# Patient Record
Sex: FEMALE | Race: WHITE | Employment: OTHER | URBAN - METROPOLITAN AREA
[De-identification: names, ages, dates, MRNs, and addresses within clinical notes are randomized per-mention and may not be internally consistent; named-entity substitution may affect disease eponyms.]

---

## 2017-07-21 ENCOUNTER — OFFICE VISIT (OUTPATIENT)
Dept: CARDIOLOGY CLINIC | Age: 82
End: 2017-07-21

## 2017-07-21 VITALS
DIASTOLIC BLOOD PRESSURE: 84 MMHG | HEIGHT: 65 IN | HEART RATE: 84 BPM | SYSTOLIC BLOOD PRESSURE: 122 MMHG | WEIGHT: 137 LBS | OXYGEN SATURATION: 95 % | BODY MASS INDEX: 22.82 KG/M2

## 2017-07-21 DIAGNOSIS — I10 ESSENTIAL HYPERTENSION: ICD-10-CM

## 2017-07-21 DIAGNOSIS — I48.19 PERSISTENT ATRIAL FIBRILLATION (HCC): Primary | ICD-10-CM

## 2017-07-21 DIAGNOSIS — E78.5 HYPERLIPIDEMIA, UNSPECIFIED HYPERLIPIDEMIA TYPE: ICD-10-CM

## 2017-07-21 PROCEDURE — 99214 OFFICE O/P EST MOD 30 MIN: CPT | Performed by: INTERNAL MEDICINE

## 2017-07-21 PROCEDURE — 4040F PNEUMOC VAC/ADMIN/RCVD: CPT | Performed by: INTERNAL MEDICINE

## 2017-07-21 PROCEDURE — G8420 CALC BMI NORM PARAMETERS: HCPCS | Performed by: INTERNAL MEDICINE

## 2017-07-21 PROCEDURE — 1123F ACP DISCUSS/DSCN MKR DOCD: CPT | Performed by: INTERNAL MEDICINE

## 2017-07-21 PROCEDURE — G8427 DOCREV CUR MEDS BY ELIG CLIN: HCPCS | Performed by: INTERNAL MEDICINE

## 2017-07-21 PROCEDURE — 93000 ELECTROCARDIOGRAM COMPLETE: CPT | Performed by: INTERNAL MEDICINE

## 2017-07-21 PROCEDURE — 1036F TOBACCO NON-USER: CPT | Performed by: INTERNAL MEDICINE

## 2017-07-21 PROCEDURE — 1090F PRES/ABSN URINE INCON ASSESS: CPT | Performed by: INTERNAL MEDICINE

## 2017-07-21 RX ORDER — FUROSEMIDE 40 MG/1
40 TABLET ORAL PRN
COMMUNITY
End: 2020-12-17 | Stop reason: SDUPTHER

## 2017-07-21 RX ORDER — POLYETHYLENE GLYCOL 3350 17 G/17G
17 POWDER, FOR SOLUTION ORAL DAILY
COMMUNITY
End: 2020-09-15 | Stop reason: ALTCHOICE

## 2018-08-02 ENCOUNTER — HOSPITAL ENCOUNTER (OUTPATIENT)
Age: 83
Discharge: HOME | End: 2018-08-02
Payer: MEDICARE

## 2018-08-02 ENCOUNTER — HOSPITAL ENCOUNTER (OUTPATIENT)
Dept: GENERAL RADIOLOGY | Age: 83
Discharge: HOME | End: 2018-08-02
Payer: MEDICARE

## 2018-08-02 DIAGNOSIS — M54.50 ACUTE LEFT-SIDED LOW BACK PAIN WITHOUT SCIATICA: ICD-10-CM

## 2018-08-02 PROCEDURE — 72110 X-RAY EXAM L-2 SPINE 4/>VWS: CPT

## 2019-06-28 ENCOUNTER — OFFICE VISIT (OUTPATIENT)
Dept: ENT CLINIC | Age: 84
End: 2019-06-28
Payer: MEDICARE

## 2019-06-28 VITALS — HEART RATE: 85 BPM | SYSTOLIC BLOOD PRESSURE: 124 MMHG | DIASTOLIC BLOOD PRESSURE: 70 MMHG | OXYGEN SATURATION: 96 %

## 2019-06-28 DIAGNOSIS — R04.0 EPISTAXIS, RECURRENT: Primary | ICD-10-CM

## 2019-06-28 PROCEDURE — 99202 OFFICE O/P NEW SF 15 MIN: CPT | Performed by: OTOLARYNGOLOGY

## 2019-06-28 PROCEDURE — 1090F PRES/ABSN URINE INCON ASSESS: CPT | Performed by: OTOLARYNGOLOGY

## 2019-06-28 PROCEDURE — G8421 BMI NOT CALCULATED: HCPCS | Performed by: OTOLARYNGOLOGY

## 2019-06-28 PROCEDURE — 1123F ACP DISCUSS/DSCN MKR DOCD: CPT | Performed by: OTOLARYNGOLOGY

## 2019-06-28 PROCEDURE — G8427 DOCREV CUR MEDS BY ELIG CLIN: HCPCS | Performed by: OTOLARYNGOLOGY

## 2019-06-28 PROCEDURE — 4040F PNEUMOC VAC/ADMIN/RCVD: CPT | Performed by: OTOLARYNGOLOGY

## 2019-06-28 PROCEDURE — 1036F TOBACCO NON-USER: CPT | Performed by: OTOLARYNGOLOGY

## 2019-06-28 PROCEDURE — 30901 CONTROL OF NOSEBLEED: CPT | Performed by: OTOLARYNGOLOGY

## 2019-06-28 RX ORDER — SIMVASTATIN 40 MG
40 TABLET ORAL NIGHTLY
COMMUNITY
End: 2020-06-15 | Stop reason: ALTCHOICE

## 2019-06-29 ASSESSMENT — ENCOUNTER SYMPTOMS
SINUS PRESSURE: 0
FACIAL SWELLING: 0
SINUS PAIN: 0
EYES NEGATIVE: 1
TROUBLE SWALLOWING: 0
RHINORRHEA: 0
ALLERGIC/IMMUNOLOGIC NEGATIVE: 1
SORE THROAT: 0
RESPIRATORY NEGATIVE: 1
VOICE CHANGE: 0

## 2019-06-29 NOTE — PROGRESS NOTES
SUBJECTIVE:    Chief Complaint   Patient presents with    Epistaxis     Nosebleeds 1 heavy a day, mainly the left side, End of April is when it all started. Charmayne Guppy is a 80 y.o. female    Patient has had recurrent left epistaxis for few weeks but has not needed packing. No anticoagulants used and no prior history or other bleeding. No family history. Does nort smoke. No trauma. Last episode this AM.    Past Medical History:   Diagnosis Date    Cancer ([facility] [address]     Hypertension       No past surgical history on file. Family History   Problem Relation Age of Onset    Heart Disease Neg Hx       Social History     Tobacco Use    Smoking status: Never Smoker    Smokeless tobacco: Never Used   Substance Use Topics    Alcohol use: No        Review of Systems:  Review of Systems   Constitutional: Negative. Negative for fever. HENT: Positive for nosebleeds. Negative for congestion, dental problem, drooling, ear discharge, ear pain, facial swelling, hearing loss, mouth sores, postnasal drip, rhinorrhea, sinus pressure, sinus pain, sneezing, sore throat, tinnitus, trouble swallowing and voice change. Eyes: Negative. Respiratory: Negative. Cardiovascular: Negative. Endocrine: Negative. Skin: Negative. Allergic/Immunologic: Negative. Neurological: Negative. Negative for dizziness. Hematological: Negative. Psychiatric/Behavioral: Negative. OBJECTIVE:  /70   Pulse 85   SpO2 96%   Physical Exam   Constitutional: She is oriented to person, place, and time. She appears well-developed and well-nourished. HENT:   Head: Normocephalic and atraumatic. Right Ear: External ear normal.   Left Ear: External ear normal.   Mouth/Throat: Oropharynx is clear and moist. No oropharyngeal exudate. Indirect laryngoscopy is negative. Nasal passages treated with cotton impregnated with Afrin and Lidocaine for 5 minutes. Bleeding site appears to be anterior septum on the left. Treated with silver nitrate cautery. Eyes: Pupils are equal, round, and reactive to light. Conjunctivae and EOM are normal.   Neck: Normal range of motion. Neck supple. No tracheal deviation present. No thyromegaly present. Cardiovascular: Normal rate and regular rhythm. Pulmonary/Chest: Effort normal.   Lymphadenopathy:     She has no cervical adenopathy. Neurological: She is alert and oriented to person, place, and time. Skin: Skin is warm and dry. Psychiatric: She has a normal mood and affect. Her behavior is normal. Judgment and thought content normal.        ASSESSMENT:    Recurrent left epistaxis anterior    PLAN:     Will use saline gel TID and Bactroban BID for 2 weeks. Instructed to not lift or bend for 24 hours. Will return ir recurs.     Ela Arias MD

## 2019-10-22 ENCOUNTER — HOSPITAL ENCOUNTER (OUTPATIENT)
Age: 84
Discharge: HOME | End: 2019-10-22
Payer: MEDICARE

## 2019-10-22 ENCOUNTER — HOSPITAL ENCOUNTER (OUTPATIENT)
Dept: GENERAL RADIOLOGY | Age: 84
Discharge: HOME | End: 2019-10-22
Payer: MEDICARE

## 2019-10-22 DIAGNOSIS — G89.29 UPPER BACK PAIN, CHRONIC: ICD-10-CM

## 2019-10-22 DIAGNOSIS — R10.9 FLANK PAIN: ICD-10-CM

## 2019-10-22 DIAGNOSIS — M54.9 UPPER BACK PAIN, CHRONIC: ICD-10-CM

## 2019-10-22 PROCEDURE — 72070 X-RAY EXAM THORAC SPINE 2VWS: CPT

## 2019-10-22 PROCEDURE — 72100 X-RAY EXAM L-S SPINE 2/3 VWS: CPT

## 2020-04-02 ENCOUNTER — APPOINTMENT (OUTPATIENT)
Dept: CT IMAGING | Age: 85
End: 2020-04-02
Payer: MEDICARE

## 2020-04-02 ENCOUNTER — HOSPITAL ENCOUNTER (EMERGENCY)
Age: 85
Discharge: HOME | End: 2020-04-02
Payer: MEDICARE

## 2020-04-02 ENCOUNTER — APPOINTMENT (OUTPATIENT)
Dept: GENERAL RADIOLOGY | Age: 85
End: 2020-04-02
Payer: MEDICARE

## 2020-04-02 VITALS
SYSTOLIC BLOOD PRESSURE: 122 MMHG | WEIGHT: 135 LBS | BODY MASS INDEX: 25.49 KG/M2 | HEART RATE: 76 BPM | DIASTOLIC BLOOD PRESSURE: 56 MMHG | TEMPERATURE: 98.2 F | RESPIRATION RATE: 16 BRPM | OXYGEN SATURATION: 95 % | HEIGHT: 61 IN

## 2020-04-02 PROCEDURE — 71101 X-RAY EXAM UNILAT RIBS/CHEST: CPT

## 2020-04-02 PROCEDURE — 72125 CT NECK SPINE W/O DYE: CPT

## 2020-04-02 PROCEDURE — 6370000000 HC RX 637 (ALT 250 FOR IP): Performed by: NURSE PRACTITIONER

## 2020-04-02 PROCEDURE — 70450 CT HEAD/BRAIN W/O DYE: CPT

## 2020-04-02 PROCEDURE — 99284 EMERGENCY DEPT VISIT MOD MDM: CPT

## 2020-04-02 RX ORDER — LIDOCAINE 4 G/G
1 PATCH TOPICAL ONCE
Status: DISCONTINUED | OUTPATIENT
Start: 2020-04-02 | End: 2020-04-02 | Stop reason: HOSPADM

## 2020-04-02 ASSESSMENT — ENCOUNTER SYMPTOMS
RHINORRHEA: 0
NAUSEA: 0
SHORTNESS OF BREATH: 0
CONSTIPATION: 0
VOMITING: 0
BLOOD IN STOOL: 0
DIARRHEA: 0
SORE THROAT: 0
ABDOMINAL PAIN: 0

## 2020-04-02 ASSESSMENT — PAIN SCALES - GENERAL: PAINLEVEL_OUTOF10: 7

## 2020-04-02 ASSESSMENT — PAIN DESCRIPTION - LOCATION: LOCATION: RIB CAGE

## 2020-04-02 ASSESSMENT — PAIN DESCRIPTION - ORIENTATION: ORIENTATION: LEFT

## 2020-04-02 NOTE — ED PROVIDER NOTES
[facility]        Pt Name: Juana Hong  MRN: [id number]  [place] 4/10/1928  Date of evaluation: 4/2/2020  Provider: OZZIE Roberts CNP  PCP: Todd Lagunas MD    This patient was not seen and evaluated by the attending physician No att. providers found. CHIEF COMPLAINT       Chief Complaint   Patient presents with    Fall     pt brought in by family after falling in the tub yesterday. pt not sure what happened, but states she was getting out of the tub and the next thing she knew she was falling backward into the tub. pt was unable to get self out of the tub and had to wait for family. HISTORY OF PRESENT ILLNESS   (Location/Symptom, Timing/Onset,Context/Setting, Quality, Duration, Modifying Factors, Severity)  Note limiting factors. Juana Hong is a 80 y.o. female who presents emergency department with concern for evaluation after a fall. This woman reports that yesterday she fell over the bathtub and landed into the bathtub. No head injury or LOC. She is having pain to her left ribs. She is anticoagulated on Xarelto. She denies fever, rash, headaches, dizziness, chest pain, shortness of breath, cough, congestion, abdominal pain, nausea, vomiting, diarrhea, constipation, blood in the stool, or painful urination. No family at bedside. Nursing Notes triage note reviewed and agreed with or any disagreements were addressed  in the HPI. REVIEW OF SYSTEMS    (2-9 systems for level 4, 10 or more for level 5)     Review of Systems   Constitutional: Negative for chills and fever. HENT: Negative for postnasal drip, rhinorrhea and sore throat. Eyes: Negative for visual disturbance. Respiratory: Negative for shortness of breath. Cardiovascular: Negative for chest pain. Gastrointestinal: Negative for abdominal pain, blood in stool, constipation, diarrhea, nausea and vomiting.    Genitourinary: Normal breath sounds. No stridor. No wheezing or rales. Chest:      Chest wall: Tenderness present. Abdominal:      General: Bowel sounds are normal. There is no distension. Palpations: Abdomen is soft. There is no mass. Tenderness: There is no abdominal tenderness. There is no guarding or rebound. Musculoskeletal: Normal range of motion. General: No tenderness. Skin:     General: Skin is warm and dry. Coloration: Skin is not pale. Comments: Erythema noted to the back concerning for sunburn. Neurological:      Mental Status: She is alert and oriented to person, place, and time. Coordination: Coordination normal.   Psychiatric:         Behavior: Behavior normal.         DIAGNOSTIC RESULTS   LABS:    Labs Reviewed - No data to display    All other labs werewithin normal range or not returned as of this dictation. EKG: All EKG's are interpreted by the Emergency Department Physician who either signs or Co-signs this chart in the absence of acardiologist.  Please see their note for interpretation of EKG. RADIOLOGY:   Interpretation per the Radiologist below, if available at the time of this note:    CT Cervical Spine WO Contrast   Final Result   No acute abnormality of the cervical spine. Minimal loss of height, T3 and T4 more likely old as above. CT Head WO Contrast   Preliminary Result   1. No acute intracranial abnormality. 2. Stable mild age-appropriate atrophy and chronic small vessel ischemic   white matter disease. 3. Mild right mastoid disease. XR RIBS LEFT INCLUDE CHEST (MIN 3 VIEWS)   Final Result   1. No acute abnormality seen in the chest or left ribs   2. Hiatal hernia           No results found.       PROCEDURES   Unless otherwise noted below, none     Procedures    CRITICAL CARE TIME     n/a    CONSULTS:  None      EMERGENCY DEPARTMENT COURSE and DIFFERENTIAL DIAGNOSIS/MDM:   Vitals:    Vitals:    04/02/20 1100 04/02/20 1130 04/02/20 1200 04/02/20 1308   BP: (!) 140/72 137/66 138/62 130/76   Pulse:    76   Resp:    16   Temp:       TempSrc:       SpO2: 96% 98% 95% 98%   Weight:       Height:           Dimitri Brunner was given the following medications:  Medications   lidocaine 4 % external patch 1 patch (1 patch Transdermal Patch Applied 4/2/20 1205)       Dimitri Brunner was evaluated in the emergency department with concern for evaluation after fall. Complaining of left rib pain. She is anticoagulant on Xarelto, therefore CT imaging was performed. CT imaging of head and neck are negative for acute abnormality. X-ray imaging of the chest is also negative and shows no evidence of rib fractures. Treated with Lidoderm pain patch in the ER. I was able to ambulate the patient without any assistance safely prior to discharge. My suspicion is low for serious injury including fracture, dislocation, compartment syndrome, or intracranial hemorrhage. The injury sounds consistent with mechanical fall. My suspicion is low for syncopy, arrhythmia, TIA, stroke, seizure, abuse, or PE. Dimitri Brunner is stable in the ER and safe to follow as an outpatient. The patient is discharged on the following medications. They were counseled on how to take the newly prescribed medications:  New Prescriptions    LIDOCAINE-MENTHOL 4-1 % PTCH    Apply 1 patch topically every 24 hours For 12 hrs on and 12 hrs off    . Instructed to follow-up with:  Gilson Flowers MD  [id number]    Schedule an appointment as soon as possible for a visit in 3 days  For a recheck    Return to the ER for new or worsening symptoms. This plan was discussed with the patient and all family available in the room at discharge who are all in agreement with the plan. I evaluated the patient. The physician was available for consultation as needed.   The patient and / or the family were informed of the results of any tests, a time was given to answer questions, a plan was proposed and they agreed with plan. FINAL IMPRESSION      1. Fall, initial encounter    2. Anticoagulated    3.  Rib pain on left side          DISPOSITION/PLAN   DISPOSITION Decision To Discharge 04/02/2020 02:07:43 PM        DISCONTINUED MEDICATIONS:  Discontinued Medications    No medications on file                (Please note that portions of this note were completed with a voice recognition program.  Efforts were made to edit the dictations but occasionally words are mis-transcribed.)    OZZIE Crisostomo CNP (electronically signed)        OZZIE Crisostomo CNP  04/02/20 6952

## 2020-04-02 NOTE — ED NOTES
Patient discharged at this time in no acute distress after verbalizing understanding of discharge instructions and need for follow up with PCP .   Pt left ambulatory to discharge area after reviewing and receiving copy of ov AVS.   Patient education  Learner- Patient and family  Motivation and readiness to learn- Medium to High  Barriers to learning- None  Learning preference/provided instructions- Both written and verbal discharge instructions     Leda Thomas RN  04/02/20 0179

## 2020-04-02 NOTE — ED NOTES
Assume patient care at this time. Agree with previous assessment. Pt alert, STYLES, NAD, resps easy and even. Current Plan of Care reviewed with patient. Pt denies any needs or questions at this time. Bed locked and in low position, with side rails up x 2.       Caterina Durán RN  04/02/20 4218

## 2020-06-02 ENCOUNTER — TELEPHONE (OUTPATIENT)
Dept: CARDIOLOGY CLINIC | Age: 85
End: 2020-06-02

## 2020-06-02 ENCOUNTER — TELEPHONE (OUTPATIENT)
Dept: FAMILY MEDICINE CLINIC | Age: 85
End: 2020-06-02

## 2020-06-02 NOTE — TELEPHONE ENCOUNTER
Pts granddaughter calling, pt is trying to find a new PCP and pt is out of Rx rivaroxaban (XARELTO) 15 MG TABS tablet 1 tab daily and old PCP won't fill it. Pt wants to know if we can fill it for 1 mo until pt finds new PCP? Pharmacy info :  [facility]/pharmacy #[id number]- [facility], [address] [place] [address].  Pls call to advise Thank you

## 2020-06-15 ENCOUNTER — VIRTUAL VISIT (OUTPATIENT)
Dept: FAMILY MEDICINE CLINIC | Age: 85
End: 2020-06-15
Payer: MEDICARE
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
## 2020-06-15 PROBLEM — M54.9 CHRONIC BACK PAIN: Status: ACTIVE | Noted: 2020-06-15

## 2020-06-15 PROBLEM — M25.473 ANKLE SWELLING: Status: ACTIVE | Noted: 2020-06-15

## 2020-06-15 PROBLEM — G89.29 CHRONIC BACK PAIN: Status: ACTIVE | Noted: 2020-06-15

## 2020-06-15 PROCEDURE — 1090F PRES/ABSN URINE INCON ASSESS: CPT | Performed by: FAMILY MEDICINE

## 2020-06-15 PROCEDURE — G8427 DOCREV CUR MEDS BY ELIG CLIN: HCPCS | Performed by: FAMILY MEDICINE

## 2020-06-15 PROCEDURE — 1036F TOBACCO NON-USER: CPT | Performed by: FAMILY MEDICINE

## 2020-06-15 PROCEDURE — G8417 CALC BMI ABV UP PARAM F/U: HCPCS | Performed by: FAMILY MEDICINE

## 2020-06-15 PROCEDURE — 1123F ACP DISCUSS/DSCN MKR DOCD: CPT | Performed by: FAMILY MEDICINE

## 2020-06-15 PROCEDURE — 99204 OFFICE O/P NEW MOD 45 MIN: CPT | Performed by: FAMILY MEDICINE

## 2020-06-15 PROCEDURE — 4040F PNEUMOC VAC/ADMIN/RCVD: CPT | Performed by: FAMILY MEDICINE

## 2020-06-15 ASSESSMENT — PATIENT HEALTH QUESTIONNAIRE - PHQ9
SUM OF ALL RESPONSES TO PHQ QUESTIONS 1-9: 0
SUM OF ALL RESPONSES TO PHQ QUESTIONS 1-9: 0

## 2020-06-15 ASSESSMENT — LIFESTYLE VARIABLES: HOW OFTEN DO YOU HAVE A DRINK CONTAINING ALCOHOL: 0

## 2020-06-15 NOTE — PROGRESS NOTES
of epistasix: hx of seeing ENT in 2019. Given bactroban and saline gel to use. Hx of ARF 2/2 sepsis- . Hx of skin cancer: ? type    Hx of cataract surgery     SH:  6/15/20: she lives with her granddaughter who is age 32 and has lived with her since birth. Daughter used to live with them too but she  10 years ago. No other children. Social History     Socioeconomic History    Marital status:      Spouse name: Not on file    Number of children: Not on file    Years of education: Not on file    Highest education level: Not on file   Occupational History    Not on file   Social Needs    Financial resource strain: Not on file    Food insecurity     Worry: Not on file     Inability: Not on file    Transportation needs     Medical: Not on file     Non-medical: Not on file   Tobacco Use    Smoking status: Never Smoker    Smokeless tobacco: Never Used   Substance and Sexual Activity    Alcohol use: No    Drug use: Never    Sexual activity: Not on file   Lifestyle    Physical activity     Days per week: Not on file     Minutes per session: Not on file    Stress: Not on file   Relationships    Social connections     Talks on phone: Not on file     Gets together: Not on file     Attends Mandaen service: Not on file     Active member of club or organization: Not on file     Attends meetings of clubs or organizations: Not on file     Relationship status: Not on file    Intimate partner violence     Fear of current or ex partner: Not on file     Emotionally abused: Not on file     Physically abused: Not on file     Forced sexual activity: Not on file   Other Topics Concern    Not on file   Social History Narrative    Not on file         Review of Systems  As documented in the HPI. Currently no complaints of CP or CIERA. Vital Signs: (As obtained by patient/caregiver or practitioner observation)    There were no vitals taken for this visit.   Patient-Reported Vitals

## 2020-06-15 NOTE — PATIENT INSTRUCTIONS
Personalized Preventive Plan for Lien [facility] - 6/15/2020  Medicare offers a range of preventive health benefits. Some of the tests and screenings are paid in full while other may be subject to a deductible, co-insurance, and/or copay. Some of these benefits include a comprehensive review of your medical history including lifestyle, illnesses that may run in your family, and various assessments and screenings as appropriate. After reviewing your medical record and screening and assessments performed today your provider may have ordered immunizations, labs, imaging, and/or referrals for you. A list of these orders (if applicable) as well as your Preventive Care list are included within your After Visit Summary for your review. Other Preventive Recommendations:    · A preventive eye exam performed by an eye specialist is recommended every 1-2 years to screen for glaucoma; cataracts, macular degeneration, and other eye disorders. · A preventive dental visit is recommended every 6 months. · Try to get at least 150 minutes of exercise per week or 10,000 steps per day on a pedometer . · Order or download the FREE \"Exercise & Physical Activity: Your Everyday Guide\" from [employer]. Call 5-136.709.2894 or search [employer] online. · You need 6343-6572 mg of calcium and 7477-7857 IU of vitamin D per day. It is possible to meet your calcium requirement with diet alone, but a vitamin D supplement is usually necessary to meet this goal.  · When exposed to the sun, use a sunscreen that protects against both UVA and UVB radiation with an SPF of 30 or greater. Reapply every 2 to 3 hours or after sweating, drying off with a towel, or swimming. · Always wear a seat belt when traveling in a car. Always wear a helmet when riding a bicycle or motorcycle.
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
## 2020-08-06 ENCOUNTER — TELEPHONE (OUTPATIENT)
Dept: FAMILY MEDICINE CLINIC | Age: 85
End: 2020-08-06

## 2020-08-06 NOTE — TELEPHONE ENCOUNTER
Pt granddaughter Rissa Ojeda called bc pt handicap card is getting ready to exp. Pt needs letter for new handicap card. [phone] [address] for letter? Please advise.  Thanks

## 2020-08-06 NOTE — LETTER
[facility] [address]  Phone: [phone]  Fax: [phone]    Fidel Mi MD        August 6, 2020     Patient: Natalee Pulido   YOB: 1928   Date of Visit: 8/6/2020       To Whom It May Concern: It is my medical opinion that Ely Martínez requires a disability parking placard for the following reasons:  She cannot walk 200 feet without stopping to rest.  Duration of need: 5 years    If you have any questions or concerns, please don't hesitate to call.     Sincerely,          Fidel Mi MD

## 2020-09-15 ENCOUNTER — VIRTUAL VISIT (OUTPATIENT)
Dept: FAMILY MEDICINE CLINIC | Age: 85
End: 2020-09-15
Payer: MEDICARE

## 2020-09-15 VITALS — HEIGHT: 61 IN | BODY MASS INDEX: 24.35 KG/M2 | WEIGHT: 129 LBS

## 2020-09-15 PROCEDURE — 4040F PNEUMOC VAC/ADMIN/RCVD: CPT | Performed by: FAMILY MEDICINE

## 2020-09-15 PROCEDURE — 1123F ACP DISCUSS/DSCN MKR DOCD: CPT | Performed by: FAMILY MEDICINE

## 2020-09-15 PROCEDURE — G0438 PPPS, INITIAL VISIT: HCPCS | Performed by: FAMILY MEDICINE

## 2020-09-15 RX ORDER — BLOOD PRESSURE TEST KIT
1 KIT MISCELLANEOUS WEEKLY
Qty: 1 KIT | Refills: 0 | Status: SHIPPED | OUTPATIENT
Start: 2020-09-15

## 2020-09-15 ASSESSMENT — PATIENT HEALTH QUESTIONNAIRE - PHQ9
SUM OF ALL RESPONSES TO PHQ9 QUESTIONS 1 & 2: 0
SUM OF ALL RESPONSES TO PHQ QUESTIONS 1-9: 0
2. FEELING DOWN, DEPRESSED OR HOPELESS: 0
SUM OF ALL RESPONSES TO PHQ QUESTIONS 1-9: 0
1. LITTLE INTEREST OR PLEASURE IN DOING THINGS: 0

## 2020-09-15 ASSESSMENT — LIFESTYLE VARIABLES: HOW OFTEN DO YOU HAVE A DRINK CONTAINING ALCOHOL: 0

## 2020-09-15 NOTE — PROGRESS NOTES
Medicare Annual Wellness Visit  Name: Romero Pond Date: 9/15/2020   MRN: [id number] Sex: Female   Age: 80 y.o. Ethnicity: Non-/Non    : 4/10/1928 Race: [place] Matt is here for CHAINels and Other (concerned w/ posture after her fall 2 yrs ago)    Screenings for behavioral, psychosocial and functional/safety risks, and cognitive dysfunction are all negative except as indicated below. These results, as well as other patient data from the [address] form, are documented in Flowsheets linked to this Encounter. No Known Allergies    Prior to Visit Medications    Medication Sig Taking? Authorizing Provider   rivaroxaban (XARELTO) 15 MG TABS tablet Take 1 tablet by mouth daily Yes Claudia Longoria MD   furosemide (LASIX) 40 MG tablet Take 40 mg by mouth as needed Yes Historical Provider, MD   diltiazem (CARDIZEM CD) 240 MG ER capsule Take 1 capsule by mouth daily Yes Marietta Hernandez MD   famotidine (PEPCID) 20 MG tablet Take 1 tablet by mouth 2 times daily Yes Marietta Hernandez MD   lovastatin (MEVACOR) 40 MG tablet Take 40 mg by mouth nightly. Yes Historical Provider, MD       Past Medical History:   Diagnosis Date    Atrial fibrillation ([address]     History of skin cancer     Hyperlipidemia     Hypertension        No past surgical history on file. Family History   Problem Relation Age of Onset    Heart Disease Neg Hx        CareTeam (Including outside providers/suppliers regularly involved in providing care):   Patient Care Team:  Claudia Longoria MD as PCP - General (Family Medicine)  Claudia Longoria MD as PCP - [facility] Empaneled Provider    Wt Readings from Last 3 Encounters:   09/15/20 129 lb (58.5 kg)   20 135 lb (61.2 kg)   17 137 lb (62.1 kg)     Patient-Reported Vitals 6/15/2020   Patient-Reported Weight 135 lb   Patient-Reported Height 5 1      Body mass index is 24.37 kg/m².     Based upon direct observation of the patient, evaluation of cognition reveals recent and remote memory intact. Physical Exam  Constitutional:       General: She is not in acute distress. Appearance: Normal appearance. She is not ill-appearing. HENT:      Head: Normocephalic and atraumatic. Nose: Nose normal.   Pulmonary:      Effort: Pulmonary effort is normal. No respiratory distress. Neurological:      General: No focal deficit present. Mental Status: She is alert. Psychiatric:         Mood and Affect: Mood normal.         Behavior: Behavior normal.           Patient's complete Health Risk Assessment and screening values have been reviewed and are found in Flowsheets. The following problems were reviewed today and where indicated follow up appointments were made and/or referrals ordered. Positive Risk Factor Screenings with Interventions:     Fall Risk:  2 or more falls in past year?: no  Fall with injury in past year?: (!) yes(fell backwards in the backtub, went to the ER)  Fall Risk Interventions:    · Home safety tips provided  · Patient declines any further evaluation/treatment for this issue    General Health:  General  In general, how would you say your health is?: Very Good  In the past 7 days, have you experienced any of the following?  New or Increased Pain, New or Increased Fatigue, Loneliness, Social Isolation, Stress or Anger?: (!) New or Increased Pain, Loneliness(still aches and pains from fall; stopped driving)  Do you get the social and emotional support that you need?: Yes  Do you have a Living Will?: Yes  General Health Risk Interventions:  · Pain issues: patient declines any further evaluation/treatment for this issue    Health Habits/Nutrition:  Health Habits/Nutrition  Do you exercise for at least 20 minutes 2-3 times per week?: (!) No  Have you lost any weight without trying in the past 3 months?: No  Do you eat fewer than 2 meals per day?: No  Have you seen a dentist within the past year?: (!) No  Body mass index is 24.37 kg/m². Health Habits/Nutrition Interventions:  · Dental exam overdue:  patient encouraged to make appointment with his/her dentist    Hearing/Vision:  No exam data present  Hearing/Vision  Do you or your family notice any trouble with your hearing?: No  Do you have difficulty driving, watching TV, or doing any of your daily activities because of your eyesight?: No  Have you had an eye exam within the past year?: (!) No  Hearing/Vision Interventions:  · Vision concerns:  patient encouraged to make appointment with his/her eye specialist    ADL:  ADLs  In the past 7 days, did you need help from others to perform any of the following everyday activities? Eating, dressing, grooming, bathing, toileting, or walking/balance?: None  In the past 7 days, did you need help from others to take care of any of the following? Laundry, housekeeping, banking/finances, shopping, telephone use, food preparation, transportation, or taking medications?: [employer], Housekeeping, Shopping, Food Preparation(granddaughter lives with her to assist)  ADL Interventions:  · Patient declines any further evaluation/treatment for this issue    Personalized Preventive Plan   Current Health Maintenance Status  Immunization History   Administered Date(s) Administered    Tdap (Boostrix, Adacel) 05/07/2014        Health Maintenance   Topic Date Due    Lipid screen  04/10/1938    Shingles Vaccine (1 of 2) 04/10/1978    Potassium monitoring  04/17/2017    Creatinine monitoring  04/17/2017    Annual Wellness Visit (AWV)  06/23/2019    Flu vaccine (1) 09/01/2020    DTaP/Tdap/Td vaccine (2 - Td) 05/07/2024    Pneumococcal 65+ years Vaccine  Completed    Hepatitis A vaccine  Aged Out    Hepatitis B vaccine  Aged Out    Hib vaccine  Aged Out    Meningococcal (ACWY) vaccine  Aged Out     Recommendations for Stemgent Due: see orders and patient instructions/AVS.  .   Recommended screening schedule for the next 5-10 years is provided to the patient in written form: see Patient Romina Patel was seen today for medicare awv and other. Diagnoses and all orders for this visit:    Routine general medical examination at a health care facility          Atrial Fibrillation: The patient is on xarelto for anticoagulation - reports she has always been on 15 mg and Cardizem for rate control. Hx of seeing Dr. Milton Roberson last in 2017. She has been stable on this regimen a long time.     HTN: The patient is on Diltiazem. Will do BMP.      HLD: She is on Mevacor. Will check lipids.      CKD stage 3: Will do BMP     Ankle edema: the patient takes lasix as needed. Hasn't needed it in months.      GERD:  Takes pepcid BID. Controls her symptoms well.      Hx of fall 3/20:  Slipped falling out of the shower. Her back still bothers her from the fall - improving. She declined additional intervention. Granddaughter has purchase a bathtub seat now and has made arrangements so it is safer.      Hx of epistasix: hx of seeing ENT in . Given bactroban and saline gel to use.      Hx of ARF 2/2 sepsis- .       Hx of skin cancer: ? type     Hx of cataract surgery      SH:  6/15/20: she lives with her granddaughter Jesse Mcneill who is age 31(works for [employer]?)  and has lived with her since birth. Patient's daughter used to live with them too but she  10 years ago. No other children.         Leroy Savage is a 80 y.o. female being evaluated by a Virtual Visit (video and audio) encounter to address concerns as mentioned above. A caregiver was present when appropriate. Due to this being a TeleHealth encounter (During DDWWO-93 public health emergency), evaluation of the following organ systems was limited: Vitals/Constitutional/EENT/Resp/CV/GI//MS/Neuro/Skin/Heme-Lymph-Imm.   Pursuant to the emergency declaration under the [address] and Response Supplemental Appropriations Act, this Virtual Visit was conducted with patient's (and/or legal guardian's) consent, to reduce the patient's risk of exposure to COVID-19 and provide necessary medical care. The patient (and/or legal guardian) has also been advised to contact this office for worsening conditions or problems, and seek emergency medical treatment and/or call 911 if deemed necessary. Patient identification was verified at the start of the visit: Yes    Services were provided through a video synchronous discussion virtually to substitute for in-person clinic visit. Patient and provider were located at their individual homes. --Joe Flores MD on 9/15/2020 at 8:20 AM    An electronic signature was used to authenticate this note.

## 2020-09-15 NOTE — PATIENT INSTRUCTIONS
Personalized Preventive Plan for Natalee Pulido - 9/15/2020  Medicare offers a range of preventive health benefits. Some of the tests and screenings are paid in full while other may be subject to a deductible, co-insurance, and/or copay. Some of these benefits include a comprehensive review of your medical history including lifestyle, illnesses that may run in your family, and various assessments and screenings as appropriate. After reviewing your medical record and screening and assessments performed today your provider may have ordered immunizations, labs, imaging, and/or referrals for you. A list of these orders (if applicable) as well as your Preventive Care list are included within your After Visit Summary for your review. Other Preventive Recommendations:    · A preventive eye exam performed by an eye specialist is recommended every 1-2 years to screen for glaucoma; cataracts, macular degeneration, and other eye disorders. · A preventive dental visit is recommended every 6 months. · Try to get at least 150 minutes of exercise per week or 10,000 steps per day on a pedometer . · Order or download the FREE \"Exercise & Physical Activity: Your Everyday Guide\" from [employer]. Call 0-599.456.8704 or search [employer] online. · You need 5390-0860 mg of calcium and 4368-4380 IU of vitamin D per day. It is possible to meet your calcium requirement with diet alone, but a vitamin D supplement is usually necessary to meet this goal.  · When exposed to the sun, use a sunscreen that protects against both UVA and UVB radiation with an SPF of 30 or greater. Reapply every 2 to 3 hours or after sweating, drying off with a towel, or swimming. · Always wear a seat belt when traveling in a car. Always wear a helmet when riding a bicycle or motorcycle.

## 2020-09-19 ENCOUNTER — HOSPITAL ENCOUNTER (OUTPATIENT)
Age: 85
Discharge: HOME | End: 2020-09-19
Payer: MEDICARE

## 2020-09-19 LAB
ANION GAP SERPL CALCULATED.3IONS-SCNC: 13 MMOL/L (ref 3–16)
BUN BLDV-MCNC: 20 MG/DL (ref 7–20)
CALCIUM SERPL-MCNC: 9.9 MG/DL (ref 8.3–10.6)
CHLORIDE BLD-SCNC: 104 MMOL/L (ref 99–110)
CHOLESTEROL, TOTAL: 148 MG/DL (ref 0–199)
CO2: 23 MMOL/L (ref 21–32)
CREAT SERPL-MCNC: 1.1 MG/DL (ref 0.6–1.2)
GFR AFRICAN AMERICAN: 56
GFR NON-AFRICAN AMERICAN: 46
GLUCOSE BLD-MCNC: 89 MG/DL (ref 70–99)
HDLC SERPL-MCNC: 61 MG/DL (ref 40–60)
LDL CHOLESTEROL CALCULATED: 68 MG/DL
POTASSIUM SERPL-SCNC: 4.1 MMOL/L (ref 3.5–5.1)
SODIUM BLD-SCNC: 140 MMOL/L (ref 136–145)
TRIGL SERPL-MCNC: 93 MG/DL (ref 0–150)
VLDLC SERPL CALC-MCNC: 19 MG/DL

## 2020-09-19 PROCEDURE — 80048 BASIC METABOLIC PNL TOTAL CA: CPT

## 2020-09-19 PROCEDURE — 80061 LIPID PANEL: CPT

## 2020-09-19 PROCEDURE — 36415 COLL VENOUS BLD VENIPUNCTURE: CPT

## 2020-09-24 ENCOUNTER — TELEPHONE (OUTPATIENT)
Dept: ADMINISTRATIVE | Age: 85
End: 2020-09-24

## 2020-09-24 NOTE — TELEPHONE ENCOUNTER
Den Gutierrez is calling to inform Dr. Gui Bennett that when the patient has a bowel movement, there is blood on the tissue as well. Den Gutierrez is wanting to ask if there is a test that should be ran to verify why that is happening. Patient was not present.

## 2020-09-25 ENCOUNTER — OFFICE VISIT (OUTPATIENT)
Dept: FAMILY MEDICINE CLINIC | Age: 85
End: 2020-09-25
Payer: MEDICARE

## 2020-09-25 ENCOUNTER — HOSPITAL ENCOUNTER (OUTPATIENT)
Age: 85
Discharge: HOME | DRG: 378 | End: 2020-09-25
Payer: MEDICARE

## 2020-09-25 ENCOUNTER — HOSPITAL ENCOUNTER (INPATIENT)
Age: 85
LOS: 6 days | Discharge: HOME | DRG: 378 | End: 2020-10-02
Attending: EMERGENCY MEDICINE | Admitting: HOSPITALIST
Payer: MEDICARE

## 2020-09-25 LAB
BASOPHILS ABSOLUTE: 0.1 K/UL (ref 0–0.2)
BASOPHILS ABSOLUTE: 0.1 K/UL (ref 0–0.2)
BASOPHILS RELATIVE PERCENT: 0.6 %
BASOPHILS RELATIVE PERCENT: 0.8 %
EOSINOPHILS ABSOLUTE: 0.1 K/UL (ref 0–0.6)
EOSINOPHILS ABSOLUTE: 0.1 K/UL (ref 0–0.6)
EOSINOPHILS RELATIVE PERCENT: 0.8 %
EOSINOPHILS RELATIVE PERCENT: 0.9 %
HCT VFR BLD CALC: 36.7 % (ref 36–48)
HCT VFR BLD CALC: 36.7 % (ref 36–48)
HEMOGLOBIN: 12.1 G/DL (ref 12–16)
HEMOGLOBIN: 12.2 G/DL (ref 12–16)
LYMPHOCYTES ABSOLUTE: 1.7 K/UL (ref 1–5.1)
LYMPHOCYTES ABSOLUTE: 1.9 K/UL (ref 1–5.1)
LYMPHOCYTES RELATIVE PERCENT: 18.8 %
LYMPHOCYTES RELATIVE PERCENT: 20.2 %
MCH RBC QN AUTO: 32.4 PG (ref 26–34)
MCH RBC QN AUTO: 32.5 PG (ref 26–34)
MCHC RBC AUTO-ENTMCNC: 33 G/DL (ref 31–36)
MCHC RBC AUTO-ENTMCNC: 33.2 G/DL (ref 31–36)
MCV RBC AUTO: 97.8 FL (ref 80–100)
MCV RBC AUTO: 98.3 FL (ref 80–100)
MONOCYTES ABSOLUTE: 0.8 K/UL (ref 0–1.3)
MONOCYTES ABSOLUTE: 1.2 K/UL (ref 0–1.3)
MONOCYTES RELATIVE PERCENT: 11.6 %
MONOCYTES RELATIVE PERCENT: 9.8 %
NEUTROPHILS ABSOLUTE: 5.7 K/UL (ref 1.7–7.7)
NEUTROPHILS ABSOLUTE: 6.8 K/UL (ref 1.7–7.7)
NEUTROPHILS RELATIVE PERCENT: 68.2 %
NEUTROPHILS RELATIVE PERCENT: 68.3 %
PDW BLD-RTO: 14.1 % (ref 12.4–15.4)
PDW BLD-RTO: 14.2 % (ref 12.4–15.4)
PLATELET # BLD: 201 K/UL (ref 135–450)
PLATELET # BLD: 208 K/UL (ref 135–450)
PMV BLD AUTO: 8.8 FL (ref 5–10.5)
PMV BLD AUTO: 9.4 FL (ref 5–10.5)
RBC # BLD: 3.74 M/UL (ref 4–5.2)
RBC # BLD: 3.75 M/UL (ref 4–5.2)
WBC # BLD: 10 K/UL (ref 4–11)
WBC # BLD: 8.3 K/UL (ref 4–11)

## 2020-09-25 PROCEDURE — G8427 DOCREV CUR MEDS BY ELIG CLIN: HCPCS | Performed by: FAMILY MEDICINE

## 2020-09-25 PROCEDURE — 86901 BLOOD TYPING SEROLOGIC RH(D): CPT

## 2020-09-25 PROCEDURE — 1123F ACP DISCUSS/DSCN MKR DOCD: CPT | Performed by: FAMILY MEDICINE

## 2020-09-25 PROCEDURE — 85025 COMPLETE CBC W/AUTO DIFF WBC: CPT

## 2020-09-25 PROCEDURE — 99284 EMERGENCY DEPT VISIT MOD MDM: CPT

## 2020-09-25 PROCEDURE — 80053 COMPREHEN METABOLIC PANEL: CPT

## 2020-09-25 PROCEDURE — 99213 OFFICE O/P EST LOW 20 MIN: CPT | Performed by: FAMILY MEDICINE

## 2020-09-25 PROCEDURE — 85730 THROMBOPLASTIN TIME PARTIAL: CPT

## 2020-09-25 PROCEDURE — 1090F PRES/ABSN URINE INCON ASSESS: CPT | Performed by: FAMILY MEDICINE

## 2020-09-25 PROCEDURE — 86900 BLOOD TYPING SEROLOGIC ABO: CPT

## 2020-09-25 PROCEDURE — 4040F PNEUMOC VAC/ADMIN/RCVD: CPT | Performed by: FAMILY MEDICINE

## 2020-09-25 PROCEDURE — 86850 RBC ANTIBODY SCREEN: CPT

## 2020-09-25 PROCEDURE — 1036F TOBACCO NON-USER: CPT | Performed by: FAMILY MEDICINE

## 2020-09-25 PROCEDURE — 85610 PROTHROMBIN TIME: CPT

## 2020-09-25 PROCEDURE — G8420 CALC BMI NORM PARAMETERS: HCPCS | Performed by: FAMILY MEDICINE

## 2020-09-25 PROCEDURE — 36415 COLL VENOUS BLD VENIPUNCTURE: CPT

## 2020-09-25 ASSESSMENT — ENCOUNTER SYMPTOMS
CONSTIPATION: 0
BACK PAIN: 0
BLOOD IN STOOL: 1
NAUSEA: 0
ABDOMINAL DISTENTION: 0
WHEEZING: 0
COLOR CHANGE: 0
ABDOMINAL PAIN: 0
COUGH: 0
STRIDOR: 0
SHORTNESS OF BREATH: 0
RECTAL PAIN: 0
VOMITING: 0
DIARRHEA: 0

## 2020-09-25 NOTE — TELEPHONE ENCOUNTER
Please try calling again to get more information and see how she is doing. Can do FIT cards. Please let me know how she is doing. If still not able to call her back please leave it in the inbox until resolved. Thanks.

## 2020-09-25 NOTE — PROGRESS NOTES
TELEHEALTH EVALUATION -- Audio/Visual (During [facility]- public health emergency)    Katherine Purchase   YOB: 1928    Date of Visit:  2020    No Known Allergies  Current Outpatient Medications on File Prior to Visit   Medication Sig Dispense Refill    Blood Pressure KIT 1 each by Does not apply route once a week 1 kit 0    rivaroxaban (XARELTO) 15 MG TABS tablet Take 1 tablet by mouth daily 90 tablet 1    furosemide (LASIX) 40 MG tablet Take 40 mg by mouth as needed      diltiazem (CARDIZEM CD) 240 MG ER capsule Take 1 capsule by mouth daily 30 capsule 11    famotidine (PEPCID) 20 MG tablet Take 1 tablet by mouth 2 times daily 60 tablet 11    lovastatin (MEVACOR) 40 MG tablet Take 40 mg by mouth nightly. No current facility-administered medications on file prior to visit. Wt Readings from Last 3 Encounters:   09/15/20 129 lb (58.5 kg)   20 135 lb (61.2 kg)   17 137 lb (62.1 kg)     BP Readings from Last 3 Encounters:   20 (!) 122/56   19 124/70   17 122/84          Smita Worrell (:  4/10/1928) has requested to and consented to an audio/video evaluation for the concerns or medical issues discussed below. Chief Complaint   Patient presents with    Rectal Bleeding     started last weekend, now has blood w/ each bowel movement        HPI    Rectal bleeding:  Symptoms present about 1 week. She has noticed bright red blood in the stool. It only occurs with a BM. Has a BM about once a day. No blood noticed on her pads. She is certain it is in the bowels and not vaginal or other source. The blood is always from the back. No systemic symptoms. Symptoms have been stable over the course of a week. No rectal pain. No stomach pain. Feeling good. No hx of colonoscopy. No other bleeding. Atrial Fibrillation: The patient is on xarelto for anticoagulation - reports she has always been on 15 mg and Cardizem for rate control.  Hx of seeing  Henthron last in 2017.  She has been stable on this regimen a long time.     HTN: The patient is on Diltiazem. Will do BMP.      HLD: She is on Mevacor. Will check lipids.      CKD stage 3: Will do BMP     Ankle edema: the patient takes lasix as needed. Hasn't needed it in months.      GERD:  Takes pepcid BID. Controls her symptoms well.      Hx of fall 3/20:  Slipped falling out of the shower.  Her back still bothers her from the fall - improving. She declined additional intervention.  Granddaughter has purchase a bathtub seat now and has made arrangements so it is safer.      Hx of epistasix: hx of seeing ENT in 2019. Given bactroban and saline gel to use.      Hx of ARF 2/2 sepsis- .       Hx of skin cancer: ? type     Hx of cataract surgery      SH:  6/15/20: she lives with her granddaughter Rajiv Villalobos who is age 31(works for [employer] for [employer]?)  and has lived with her since birth.  Patient's daughter used to live with them too but she  10 years ago. No other children.      Social History     Socioeconomic History    Marital status:      Spouse name: Not on file    Number of children: Not on file    Years of education: Not on file    Highest education level: Not on file   Occupational History    Not on file   Social Needs    Financial resource strain: Not on file    Food insecurity     Worry: Not on file     Inability: Not on file    Transportation needs     Medical: Not on file     Non-medical: Not on file   Tobacco Use    Smoking status: Never Smoker    Smokeless tobacco: Never Used   Substance and Sexual Activity    Alcohol use: No    Drug use: Never    Sexual activity: Not on file   Lifestyle    Physical activity     Days per week: Not on file     Minutes per session: Not on file    Stress: Not on file   Relationships    Social connections     Talks on phone: Not on file     Gets together: Not on file     Attends Rastafari service: Not on file     Active member of club or organization: Not on file     Attends meetings of clubs or organizations: Not on file     Relationship status: Not on file    Intimate partner violence     Fear of current or ex partner: Not on file     Emotionally abused: Not on file     Physically abused: Not on file     Forced sexual activity: Not on file   Other Topics Concern    Not on file   Social History Narrative    Not on file         Review of Systems  As documented in the HPI. Currently no complaints of CP or CIERA. Vital Signs: (As obtained by patient/caregiver or practitioner observation)    There were no vitals taken for this visit. Patient-Reported Vitals 6/15/2020   Patient-Reported Weight 135 lb   Patient-Reported Height 5 1        Physical Exam  Constitutional:       General: She is not in acute distress. Appearance: Normal appearance. She is not ill-appearing. HENT:      Head: Normocephalic and atraumatic. Nose: Nose normal.   Pulmonary:      Effort: Pulmonary effort is normal. No respiratory distress. Neurological:      General: No focal deficit present. Mental Status: She is alert. Psychiatric:         Mood and Affect: Mood normal.         Behavior: Behavior normal.           Assessment/Plan     1. Hematochezia  CBC, colonoscopy and GI consult. Patient advised to do all of this as soon as she can. If she develops any symptoms such as lightheadedness, etc she is to go to the ER. Return precautions reviewed. - CBC Auto Differential; Future  - [facility] - Chip Mclean MD, Gastroenterology, [facility]      Discussed medications with patient, who voiced understanding of their use and indications. All questions answered. Return for CBC, colonosocpy, GI consult . Alexis Arroyo is being evaluated by a Virtual Visit (video visit) encounter to address concerns as mentioned above. A caregiver was present when appropriate.  Due to this being a TeleHealth encounter (During [facility] public

## 2020-09-26 ENCOUNTER — APPOINTMENT (OUTPATIENT)
Dept: CT IMAGING | Age: 85
DRG: 378 | End: 2020-09-26
Payer: MEDICARE

## 2020-09-26 PROBLEM — D62 ACUTE BLOOD LOSS ANEMIA: Status: ACTIVE | Noted: 2020-09-26

## 2020-09-26 PROBLEM — N17.9 AKI (ACUTE KIDNEY INJURY) (HCC): Status: ACTIVE | Noted: 2020-09-26

## 2020-09-26 PROBLEM — K62.5 RECTAL BLEEDING: Status: ACTIVE | Noted: 2020-09-26

## 2020-09-26 LAB
A/G RATIO: 1.4 (ref 1.1–2.2)
ABO/RH: NORMAL
ALBUMIN SERPL-MCNC: 4 G/DL (ref 3.4–5)
ALP BLD-CCNC: 86 U/L (ref 40–129)
ALT SERPL-CCNC: 16 U/L (ref 10–40)
ANION GAP SERPL CALCULATED.3IONS-SCNC: 11 MMOL/L (ref 3–16)
ANTIBODY SCREEN: NORMAL
APTT: 38.8 SEC (ref 24.2–36.2)
AST SERPL-CCNC: 27 U/L (ref 15–37)
BILIRUB SERPL-MCNC: <0.2 MG/DL (ref 0–1)
BUN BLDV-MCNC: 27 MG/DL (ref 7–20)
CALCIUM SERPL-MCNC: 9.3 MG/DL (ref 8.3–10.6)
CHLORIDE BLD-SCNC: 103 MMOL/L (ref 99–110)
CO2: 23 MMOL/L (ref 21–32)
CREAT SERPL-MCNC: 1.6 MG/DL (ref 0.6–1.2)
GFR AFRICAN AMERICAN: 36
GFR NON-AFRICAN AMERICAN: 30
GLOBULIN: 2.8 G/DL
GLUCOSE BLD-MCNC: 113 MG/DL (ref 70–99)
HCT VFR BLD CALC: 30.1 % (ref 36–48)
HCT VFR BLD CALC: 30.4 % (ref 36–48)
HCT VFR BLD CALC: 31.7 % (ref 36–48)
HEMOGLOBIN: 10.1 G/DL (ref 12–16)
HEMOGLOBIN: 10.3 G/DL (ref 12–16)
HEMOGLOBIN: 10.6 G/DL (ref 12–16)
INR BLD: 2.24 (ref 0.86–1.14)
POTASSIUM SERPL-SCNC: 4.1 MMOL/L (ref 3.5–5.1)
PROTHROMBIN TIME: 26.2 SEC (ref 10–13.2)
SODIUM BLD-SCNC: 137 MMOL/L (ref 136–145)
TOTAL PROTEIN: 6.8 G/DL (ref 6.4–8.2)

## 2020-09-26 PROCEDURE — 6370000000 HC RX 637 (ALT 250 FOR IP): Performed by: HOSPITALIST

## 2020-09-26 PROCEDURE — 2060000000 HC ICU INTERMEDIATE R&B

## 2020-09-26 PROCEDURE — 36415 COLL VENOUS BLD VENIPUNCTURE: CPT

## 2020-09-26 PROCEDURE — 93971 EXTREMITY STUDY: CPT

## 2020-09-26 PROCEDURE — 74174 CTA ABD&PLVS W/CONTRAST: CPT

## 2020-09-26 PROCEDURE — C9113 INJ PANTOPRAZOLE SODIUM, VIA: HCPCS | Performed by: HOSPITALIST

## 2020-09-26 PROCEDURE — 2580000003 HC RX 258: Performed by: HOSPITALIST

## 2020-09-26 PROCEDURE — 85018 HEMOGLOBIN: CPT

## 2020-09-26 PROCEDURE — 85014 HEMATOCRIT: CPT

## 2020-09-26 PROCEDURE — 6360000004 HC RX CONTRAST MEDICATION: Performed by: PHYSICIAN ASSISTANT

## 2020-09-26 PROCEDURE — 6360000002 HC RX W HCPCS: Performed by: HOSPITALIST

## 2020-09-26 PROCEDURE — 2580000003 HC RX 258: Performed by: PHYSICIAN ASSISTANT

## 2020-09-26 RX ORDER — DILTIAZEM HYDROCHLORIDE 240 MG/1
240 CAPSULE, COATED, EXTENDED RELEASE ORAL DAILY
Status: DISCONTINUED | OUTPATIENT
Start: 2020-09-26 | End: 2020-10-02 | Stop reason: HOSPADM

## 2020-09-26 RX ORDER — PANTOPRAZOLE SODIUM 40 MG/10ML
40 INJECTION, POWDER, LYOPHILIZED, FOR SOLUTION INTRAVENOUS EVERY 12 HOURS
Status: DISCONTINUED | OUTPATIENT
Start: 2020-09-26 | End: 2020-10-02 | Stop reason: HOSPADM

## 2020-09-26 RX ORDER — ONDANSETRON 2 MG/ML
4 INJECTION INTRAMUSCULAR; INTRAVENOUS EVERY 6 HOURS PRN
Status: DISCONTINUED | OUTPATIENT
Start: 2020-09-26 | End: 2020-10-02 | Stop reason: HOSPADM

## 2020-09-26 RX ORDER — SODIUM CHLORIDE 9 MG/ML
10 INJECTION INTRAVENOUS EVERY 12 HOURS
Status: DISCONTINUED | OUTPATIENT
Start: 2020-09-26 | End: 2020-10-02 | Stop reason: HOSPADM

## 2020-09-26 RX ORDER — SODIUM CHLORIDE 0.9 % (FLUSH) 0.9 %
10 SYRINGE (ML) INJECTION PRN
Status: DISCONTINUED | OUTPATIENT
Start: 2020-09-26 | End: 2020-10-02 | Stop reason: HOSPADM

## 2020-09-26 RX ORDER — ATORVASTATIN CALCIUM 10 MG/1
10 TABLET, FILM COATED ORAL DAILY
Status: DISCONTINUED | OUTPATIENT
Start: 2020-09-26 | End: 2020-09-28 | Stop reason: ALTCHOICE

## 2020-09-26 RX ORDER — 0.9 % SODIUM CHLORIDE 0.9 %
1000 INTRAVENOUS SOLUTION INTRAVENOUS ONCE
Status: COMPLETED | OUTPATIENT
Start: 2020-09-26 | End: 2020-09-26

## 2020-09-26 RX ORDER — ACETAMINOPHEN 325 MG/1
650 TABLET ORAL EVERY 6 HOURS PRN
Status: DISCONTINUED | OUTPATIENT
Start: 2020-09-26 | End: 2020-10-02 | Stop reason: HOSPADM

## 2020-09-26 RX ORDER — SODIUM CHLORIDE 0.9 % (FLUSH) 0.9 %
10 SYRINGE (ML) INJECTION EVERY 12 HOURS SCHEDULED
Status: DISCONTINUED | OUTPATIENT
Start: 2020-09-26 | End: 2020-10-02 | Stop reason: HOSPADM

## 2020-09-26 RX ORDER — POLYETHYLENE GLYCOL 3350 17 G/17G
17 POWDER, FOR SOLUTION ORAL DAILY PRN
COMMUNITY

## 2020-09-26 RX ORDER — LOVASTATIN 40 MG/1
40 TABLET ORAL NIGHTLY
Status: DISCONTINUED | OUTPATIENT
Start: 2020-09-26 | End: 2020-09-26 | Stop reason: CLARIF

## 2020-09-26 RX ORDER — ACETAMINOPHEN 650 MG/1
650 SUPPOSITORY RECTAL EVERY 6 HOURS PRN
Status: DISCONTINUED | OUTPATIENT
Start: 2020-09-26 | End: 2020-10-02 | Stop reason: HOSPADM

## 2020-09-26 RX ORDER — PROMETHAZINE HYDROCHLORIDE 25 MG/1
12.5 TABLET ORAL EVERY 6 HOURS PRN
Status: DISCONTINUED | OUTPATIENT
Start: 2020-09-26 | End: 2020-10-02 | Stop reason: HOSPADM

## 2020-09-26 RX ORDER — SODIUM CHLORIDE 9 MG/ML
INJECTION, SOLUTION INTRAVENOUS CONTINUOUS
Status: DISCONTINUED | OUTPATIENT
Start: 2020-09-26 | End: 2020-09-29

## 2020-09-26 RX ADMIN — Medication 10 ML: at 21:31

## 2020-09-26 RX ADMIN — PANTOPRAZOLE SODIUM 40 MG: 40 INJECTION, POWDER, FOR SOLUTION INTRAVENOUS at 05:09

## 2020-09-26 RX ADMIN — SODIUM CHLORIDE: 9 INJECTION, SOLUTION INTRAVENOUS at 04:17

## 2020-09-26 RX ADMIN — PANTOPRAZOLE SODIUM 40 MG: 40 INJECTION, POWDER, FOR SOLUTION INTRAVENOUS at 15:56

## 2020-09-26 RX ADMIN — SODIUM CHLORIDE 1000 ML: 9 INJECTION, SOLUTION INTRAVENOUS at 02:00

## 2020-09-26 RX ADMIN — IOPAMIDOL 75 ML: 755 INJECTION, SOLUTION INTRAVENOUS at 00:12

## 2020-09-26 RX ADMIN — Medication 10 ML: at 05:09

## 2020-09-26 RX ADMIN — Medication 10 ML: at 09:42

## 2020-09-26 RX ADMIN — DILTIAZEM HYDROCHLORIDE 240 MG: 240 CAPSULE, EXTENDED RELEASE ORAL at 09:42

## 2020-09-26 ASSESSMENT — PAIN SCALES - GENERAL: PAINLEVEL_OUTOF10: 0

## 2020-09-26 NOTE — ACP (ADVANCE CARE PLANNING)
Advanced Care Planning Note. Purpose of Encounter: Advanced care planning in light of [facility]  Parties In Attendance: Patient, granddaughter  Decisional Capacity: Yes  Subjective: Patient with rectal bleeding  Objective: Cr 1.6  Goals of Care Determination: Patient wants full support (CPR, vent, surgery, HD, no trach, no PEG)  Plan:  IVF, GI consult, colonoscopy  Code Status: Full code   Time spent on Advanced care Plannin minutes  Advanced Care Planning Documents: Completed advanced directives on chart, granddaughter is the POA.     Adán Roberts MD  2020 2:54 PM

## 2020-09-26 NOTE — ED PROVIDER NOTES
[facility]        Pt Name: Zac Ramirez  MRN: [id number]  [place] 4/10/1928  Date of evaluation: 9/25/2020  Provider: Landon Oneil PA-C  PCP: Abdirizak Fermin MD     I have seen and evaluated this patient with my supervising physician Dr Charmaine Murphy   Patient presents with    Rectal Bleeding     Has noticed some bleeding from her rectum for the last 10 days, today became much worse. Bright red blood       HISTORY OF PRESENT ILLNESS   (Location, Timing/Onset, Context/Setting, Quality, Duration, Modifying Factors, Severity, Associated Signs and Symptoms)  Note limiting factors. Zac Ramirez is a 80 y.o. female who is anticoagulated on Xarelto for atrial fibrillation history who presents to the emergency department with complaints of intermittent rectal bleeding for about 10 days. It was only with bowel movements but states that today now she has having rectal bleeding without any bowel movements. She does not report any abdominal or rectal pain with this. She does have history of hemorrhoids but has never had bleeding hemorrhoids in the past.  She denies dizziness or lightheadedness. She has not had a colonoscopy for years. She does have an appointment with GI specialist on Thursday with Dr. Betsey Awan. She has no history of GI bleed. Nursing Notes were all reviewed and agreed with or any disagreements were addressed in the HPI. REVIEW OF SYSTEMS    (2-9 systems for level 4, 10 or more for level 5)     Review of Systems   Constitutional: Negative for chills and fever. Eyes: Negative for visual disturbance. Respiratory: Negative for cough, shortness of breath, wheezing and stridor. Cardiovascular: Negative for chest pain, palpitations and leg swelling. Gastrointestinal: Positive for blood in stool.  Negative for abdominal distention, abdominal pain, constipation, diarrhea, nausea, rectal pain and vomiting. Endocrine: Negative. Genitourinary: Negative. Musculoskeletal: Negative for back pain, neck pain and neck stiffness. Skin: Negative for color change, pallor, rash and wound. Neurological: Negative for dizziness, tremors, seizures, syncope, facial asymmetry, speech difficulty, weakness, light-headedness, numbness and headaches. Psychiatric/Behavioral: Negative for confusion. All other systems reviewed and are negative. Positives and Pertinent negatives as per HPI. Except as noted above in the ROS, all other systems were reviewed and negative. PAST MEDICAL HISTORY     Past Medical History:   Diagnosis Date    Atrial fibrillation ([facility] [address]     History of skin cancer     Hyperlipidemia     Hypertension          SURGICAL HISTORY   History reviewed. No pertinent surgical history. CURRENTMEDICATIONS       Previous Medications    BLOOD PRESSURE KIT    1 each by Does not apply route once a week    DILTIAZEM (CARDIZEM CD) 240 MG ER CAPSULE    Take 1 capsule by mouth daily    FAMOTIDINE (PEPCID) 20 MG TABLET    Take 1 tablet by mouth 2 times daily    FUROSEMIDE (LASIX) 40 MG TABLET    Take 40 mg by mouth as needed    LOVASTATIN (MEVACOR) 40 MG TABLET    Take 40 mg by mouth nightly. RIVAROXABAN (XARELTO) 15 MG TABS TABLET    Take 1 tablet by mouth daily         ALLERGIES     Patient has no known allergies.     FAMILYHISTORY       Family History   Problem Relation Age of Onset    Heart Disease Neg Hx           SOCIAL HISTORY       Social History     Tobacco Use    Smoking status: Never Smoker    Smokeless tobacco: Never Used   Substance Use Topics    Alcohol use: No    Drug use: Never       SCREENINGS             PHYSICAL EXAM    (up to 7 for level 4, 8 or more for level 5)     ED Triage Vitals [09/25/20 2117]   BP Temp Temp Source Pulse Resp SpO2 Height Weight   138/70 97.9 °F (36.6 °C) Oral 83 16 98 % -- 129 lb (58.5 kg)       Physical Exam  [facility]  [address]  [place], [place]   Phone [phone]   COMPREHENSIVE METABOLIC PANEL - Abnormal; Notable for the following components:    Glucose 113 (*)     BUN 27 (*)     CREATININE 1.6 (*)     GFR Non- 30 (*)     GFR  36 (*)     All other components within normal limits    Narrative:     Performed at:  [facility]  [address]  [place], [place]   Phone [phone]   PROTIME-INR - Abnormal; Notable for the following components:    Protime 26.2 (*)     INR 2.24 (*)     All other components within normal limits    Narrative:     Performed at:  [facility]  [address]  [place], [place]   Phone [phone]   APTT - Abnormal; Notable for the following components:    aPTT 38.8 (*)     All other components within normal limits    Narrative:     Performed at:  [facility]  [address]  [place], [place]   Phone [phone]   Hospitals in [place]       All other labs were within normal range or not returned as of this dictation. EKG: All EKG's are interpreted by the Emergency Department Physician in the absence of a cardiologist.  Please see their note for interpretation of EKG. RADIOLOGY:   Non-plain film images such as CT, Ultrasound and MRI are read by the radiologist. Plain radiographic images are visualized and preliminarily interpreted by the ED Provider with the below findings:        Interpretation per the Radiologist below, if available at the time of this note:     [facility]    (Results Pending)          [facility] (Final result)   Result time 09/26/20 00:56:32   Final result by Niharika Gunter MD (09/26/20 00:56:32)                 Impression:     A definite focus of active extravasation is not identified.      There is a questionable small focus of active extravasation seen very (Please note that portions of this note were completed with a voice recognition program.  Efforts were made to edit the dictations but occasionally words are mis-transcribed.)    Deborah Aldana PA-C (electronically signed)    '      Deborah Aldana PA-C  09/26/20 1500

## 2020-09-26 NOTE — H&P
_    [facility]    9/26/2020 4:38 AM    Patient Information:  Suma Reed is a 80 y.o. female [id number]    PCP:  Zoey Montana MD (Tel: [phone] )    Date of Service:   Pt seen/examined on 9/26/2020   Admitted to Inpatient with expected LOS greater than two midnights due to medical therapy. Chief complaint:    Chief Complaint   Patient presents with    Rectal Bleeding     Has noticed some bleeding from her rectum for the last 10 days, today became much worse. Bright red blood       History of Present Illness:  Ankit Orosco is a 80 y.o. female who presented with   · Comes w/. C/o BRBPR X 10 days and Gradually worsening and inc in last 1-2 days @ Home . · No rectal pain   · No diarrhea   · No N/V  · No F/C   · Is on Blood thinners @ Home for AFIB   · Past C scope done may many years ago and unremarkable per patient       History and pertinent information obtained from   · ED provider   · Prior Chart    · Patient  /  Family        So far, Notable/significant ED Findings/VItals :   · VSS        While in ED  · Patient care Given  · Monitoring done   · Pertinent Labs done and acute issues noted. · Imaging  CT, done in ED . Acute issues noted as below. While in ED, Indicated/Pertinent Medications/Care given as below      · IVF X 1       · Imaging done in ED as below. And is/are s/o No acute findings or is unremarkable for any acute pathology needing acute interventions, on review. · Pertinent Abnormal Labs and their interpretation/active and acute issues as mentioned below. Currently, on my evaluation, patient is :   · Since arrival, post above Rx, patient is AO x 3   · Labs stable   · VSS in ED       Patient denies any acute complaints to me, needing interventions specifically & emergently @ Time of my evaluation.  Patient is not in acute hemodynamically unstable distress from respiratory or cardiac standpoint @ time of my evaluation. REVIEW OF SYSTEMS:     Constitutional:  Negative for fever, chills or night sweats  ENT:   Negative for rhinorrhea, epistaxis, hoarseness, sore throat. Respiratory:   Negative for shortness of breath, wheezing  Cardiovascular:   Negative for  chest pain, palpitations   Gastrointestinal:   As above Genitourinary:   Negative for polyuria, dysuria   Hematologic/Lymphatic:   Negative for  bleeding tendency, easy bruising  Musculoskeletal:   Negative for myalgias and arthralgias  Neurologic:   Negative for  Confusion, dysarthria. Skin:   Negative for itching,rash  Psychiatric:  Negative for depression, anxiety, agitation. Endocrine:  Negative for polydipsia, polyuria,heat /cold intolerance. Past Medical History:         has a past medical history of Atrial fibrillation ([facility] [address] History of skin cancer, Hyperlipidemia, and Hypertension. Past Surgical History:         has no past surgical history on file. Medications:        Current Outpatient Medications on File Prior to Encounter   Medication Sig Dispense Refill    Blood Pressure KIT 1 each by Does not apply route once a week 1 kit 0    rivaroxaban (XARELTO) 15 MG TABS tablet Take 1 tablet by mouth daily 90 tablet 1    furosemide (LASIX) 40 MG tablet Take 40 mg by mouth as needed      diltiazem (CARDIZEM CD) 240 MG ER capsule Take 1 capsule by mouth daily 30 capsule 11    famotidine (PEPCID) 20 MG tablet Take 1 tablet by mouth 2 times daily 60 tablet 11    lovastatin (MEVACOR) 40 MG tablet Take 40 mg by mouth nightly. Allergies:  No Known Allergies       Social History:   reports that she has never smoked. She has never used smokeless tobacco. She reports that she does not drink alcohol or use drugs.        Family History:            Problem Relation Age of Onset    Heart Disease Neg Hx            Physical Exam:  BP (!) 158/79   Pulse 80   Temp 97.4 °F (36.3 °C) (Oral)   Resp 16   Ht 5' 1\" (1.549 m)   Wt 127 lb 4.8 oz (57.7 kg)   SpO2 99%   BMI 24.05 kg/m²     General appearance: Appears comfortable. Well nourished   Eyes:  Sclera clear, pupils equal  ENT:  Dry   mucus membranes, Trachea midline. Cardiovascular: Ir Regular rhythm, normal S1, S2. No murmur, gallop, rub. LLE edema in lower extremities   Respiratory:  Noted Dec AE B/ L . Gastrointestinal:  Abdomen soft,  non-tender,  not distended,   Musculoskeletal:  No cyanosis in digits, neck supple  Neurology:  Cranial nerves grossly intact. Alert and oriented in time, place and person. No speech or motor deficits   Psychiatry:  Appropriate affect. Not agitated  Skin:  Warm, dry, normal turgor, no rash       Labs:     Recent Labs     09/25/20  1540 09/25/20  2330   WBC 8.3 10.0   HGB 12.2 12.1   HCT 36.7 36.7    201     Recent Labs     09/25/20  2330      K 4.1      CO2 23   BUN 27*   CREATININE 1.6*   CALCIUM 9.3     Recent Labs     09/25/20  2330   AST 27   ALT 16   BILITOT <0.2   ALKPHOS 86     Recent Labs     09/25/20  2330   INR 2.24*     No results for input(s): CKTOTAL, TROPONINI in the last 72 hours. Urinalysis:      Lab Results   Component Value Date    NITRU Negative 04/15/2016    WBCUA 6 04/15/2016    BACTERIA 4+ 04/15/2016    RBCUA 1 04/15/2016    BLOODU Negative 04/15/2016    SPECGRAV 1.005 04/15/2016    GLUCOSEU Negative 04/15/2016         Radiology:         IMAGING :     CTA ABDOMEN PELVIS W WO CONTRAST   Final Result   A definite focus of active extravasation is not identified. There is a questionable small focus of active extravasation seen very   distally near the anorectal verge posteriorly. However, that could   potentially represent a small vessel is well. Mild diverticulosis of the large bowel, but without CT evidence of   diverticulitis. Moderate to large hiatal hernia.                PROBLEM LIST      Active Hospital Problems    Diagnosis Date Noted    Mixed hyperlipidemia [E78.2] 12/28/2016    Chronic atrial fibrillation [I48.20] 04/15/2016    Essential hypertension [I10] 04/15/2016       PLAN/ORDERS FOR THIS ADMISSION/HOSPITALIZATION       · GI Bleed ? 2/2 diverticular bleed . Hold Home Xarelto for now . NPO . Trend Hb . GI c/s planned in AM   · AFIB . Chronic condition. For now, resumed home medications of  CCB . Holding home xarelto for now 2/2 above   · Essential Hypertension   · LLE edema . Planned Doppler in AM to r/o DVT   · Mixed Hyperlipidemia . Chronic condition. For now, resumed home medications of  Statins     · Home medications for chronic medical problems reviewed and Held / Resumed / Pertinent changes made [as mentioned above] in home medications, as clinically warranted/Indicated . See EPIC orders for details         · DVT Prophylaxis :  + SCDs   · Nutrition/Diet: Diet NPO Effective Now Exceptions are: Ice Chips  · Code Status: Full Code  · PT/OT and ambulatory Eval Status: Ambulate with Assist   · Probable LOS & future Disposition planned post discharge  - Home in 1-2 days       Please see EPIC orders for detailed orders/recommendations of plan and medications. CONSULTS ORDERED @ ADMISSION   IP CONSULT TO GI   IP CONSULT TO GI      Medical Decision Making : HIGH     Total patient  Care [ Direct and Indirect ] time spent in evaluating the patient an discussing plan with appropriate staff/patient/family members is 46 min       Lavern Rodrigues MD    Hospitalist, [facility].    9/26/2020 4:38 AM

## 2020-09-26 NOTE — PROGRESS NOTES
Pt admitted from home with granddaughter. Has had some bright red blood with stools for the past week, but now has just started bleeding rectally w/out a bowel movement. Hgb:12.1, is a Q8hr H&H. GI consulted. CT abd: Hiatal hernia and diverticulosis. Almost seems like internal hemrrhoids, started to bleed after wiping. Some swelling around left ankle, no pain. Doppler ordered on it. Pt has been A&O x4, alert to room and call light. Has ambulated as a stand by assist with no complications. VSS. Pt resting comfortably now with eyes closed, call light in reach. No other concerns, will continue to monitor.

## 2020-09-26 NOTE — PROGRESS NOTES
Physician Progress Note      Mercedes Lagunas  CSN #:                  [id number]  :                       4/10/1928  ADMIT DATE:       2020 10:47 PM  DISCH DATE:  RESPONDING  PROVIDER #:        Linda Jenkins MD          QUERY TEXT:    Pt admitted with GI bleed. Pt noted to have change in renal labs. On 20   BUN 20, creat 1.1, GFR 46 and on admission BUN 27, creat 1.6, GFR 30. Please   document in the progress notes and discharge summary if you are evaluating   and/or treating any of the following: The medical record reflects the following:  Risk Factors: afib RVR, HTN  Clinical Indicators:  On 20 BUN 20, creat 1.1, GFR 46 and on admission   BUN 27, creat 1.6, GFR 30  Treatment: IV fluids, monitoring renal labs    Thank you,  Michelle Marin RN, BSN, CCDS  [email or web address]  Options provided:  -- Acute kidney failure  -- Acute kidney failure with acute tubular necrosis  -- Acute kidney injury  -- CKD  -- Other - I will add my own diagnosis  -- Disagree - Not applicable / Not valid  -- Disagree - Clinically unable to determine / Unknown  -- Refer to Clinical Documentation Reviewer    PROVIDER RESPONSE TEXT:    This patient has an Acute kidney injury.     Query created by: Kasey Monzon on 2020 7:04 AM      Electronically signed by:  Linda Jenkins MD 2020 8:23 AM

## 2020-09-26 NOTE — ED NOTES
Report given to [facility], RN. All questions answered.       Colt Cortez RN  09/26/20 0002

## 2020-09-26 NOTE — ED PROVIDER NOTES
I personally interviewed and examined this patient, discussed the findings, diagnostic studies, interventions and treatment plan with [facility]. . I reviewed the clinical notes and test results. I personally supervised all pertinent procedures performed on the patient by the [facility] throughout the course and was available to manage complications as they arose, if applicable. I agree with the assessment, management and disposition as presented by the [facility] with exceptions/corrections as documented. Patient presents here with report of rectal bleeding she is guaiac positive and did have large dark blood clots on exam as well. Patient denies any pain. She is on Xarelto we will admit her here for further evaluation and management patient likely need to be scoped. For further details of this emergency department encounter, please see the [facility]'s documentation.       ED Triage Vitals [09/25/20 2117]   BP Temp Temp Source Pulse Resp SpO2 Height Weight   138/70 97.9 °F (36.6 °C) Oral 83 16 98 % -- 129 lb (58.5 kg)        Results for orders placed or performed during the hospital encounter of 09/25/20   CBC Auto Differential   Result Value Ref Range    WBC 10.0 4.0 - 11.0 K/uL    RBC 3.74 (L) 4.00 - 5.20 M/uL    Hemoglobin 12.1 12.0 - 16.0 g/dL    Hematocrit 36.7 36.0 - 48.0 %    MCV 98.3 80.0 - 100.0 fL    MCH 32.4 26.0 - 34.0 pg    MCHC 33.0 31.0 - 36.0 g/dL    RDW 14.1 12.4 - 15.4 %    Platelets 393 309 - 074 K/uL    MPV 8.8 5.0 - 10.5 fL    Neutrophils % 68.2 %    Lymphocytes % 18.8 %    Monocytes % 11.6 %    Eosinophils % 0.8 %    Basophils % 0.6 %    Neutrophils Absolute 6.8 1.7 - 7.7 K/uL    Lymphocytes Absolute 1.9 1.0 - 5.1 K/uL    Monocytes Absolute 1.2 0.0 - 1.3 K/uL    Eosinophils Absolute 0.1 0.0 - 0.6 K/uL    Basophils Absolute 0.1 0.0 - 0.2 K/uL   Comprehensive Metabolic Panel   Result Value Ref Range    Sodium 137 136 - 145 mmol/L    Potassium 4.1 3.5 - 5.1 mmol/L    Chloride 103 99 - 110 mmol/L    CO2 23 21 - 32 mmol/L    Anion Gap 11 3 - 16    Glucose 113 (H) 70 - 99 mg/dL    BUN 27 (H) 7 - 20 mg/dL    CREATININE 1.6 (H) 0.6 - 1.2 mg/dL    GFR Non-African American 30 (A) >60    GFR  36 (A) >60    Calcium 9.3 8.3 - 10.6 mg/dL    Total Protein 6.8 6.4 - 8.2 g/dL    Alb 4.0 3.4 - 5.0 g/dL    Albumin/Globulin Ratio 1.4 1.1 - 2.2    Total Bilirubin <0.2 0.0 - 1.0 mg/dL    Alkaline Phosphatase 86 40 - 129 U/L    ALT 16 10 - 40 U/L    AST 27 15 - 37 U/L    Globulin 2.8 g/dL   Protime-INR   Result Value Ref Range    Protime 26.2 (H) 10.0 - 13.2 sec    INR 2.24 (H) 0.86 - 1.14   APTT   Result Value Ref Range    aPTT 38.8 (H) 24.2 - 36.2 sec   Hemoglobin and hematocrit, blood   Result Value Ref Range    Hemoglobin 10.6 (L) 12.0 - 16.0 g/dL    Hematocrit 31.7 (L) 36.0 - 48.0 %   Hemoglobin and hematocrit, blood   Result Value Ref Range    Hemoglobin 10.3 (L) 12.0 - 16.0 g/dL    Hematocrit 30.4 (L) 36.0 - 48.0 %   Hemoglobin and hematocrit, blood   Result Value Ref Range    Hemoglobin 10.1 (L) 12.0 - 16.0 g/dL    Hematocrit 30.1 (L) 36.0 - 48.0 %   Comprehensive Metabolic Panel w/ Reflex to MG   Result Value Ref Range    Sodium 141 136 - 145 mmol/L    Potassium reflex Magnesium 4.2 3.5 - 5.1 mmol/L    Chloride 110 99 - 110 mmol/L    CO2 22 21 - 32 mmol/L    Anion Gap 9 3 - 16    Glucose 95 70 - 99 mg/dL    BUN 16 7 - 20 mg/dL    CREATININE 1.2 0.6 - 1.2 mg/dL    GFR Non-African American 42 (A) >60    GFR  51 (A) >60    Calcium 8.8 8.3 - 10.6 mg/dL    Total Protein 5.6 (L) 6.4 - 8.2 g/dL    Alb 3.3 (L) 3.4 - 5.0 g/dL    Albumin/Globulin Ratio 1.4 1.1 - 2.2    Total Bilirubin 0.6 0.0 - 1.0 mg/dL    Alkaline Phosphatase 70 40 - 129 U/L    ALT 12 10 - 40 U/L    AST 19 15 - 37 U/L    Globulin 2.3 g/dL   CBC auto differential   Result Value Ref Range    WBC 6.7 4.0 - 11.0 K/uL    RBC 3.15 (L) 4.00 - 5.20 M/uL    Hemoglobin 10.7 (L) 12.0 - 16.0 g/dL    Hematocrit 30.8 (L) 36.0 - 48.0 %    MCV 97.9 80.0 - 100.0 fL    MCH 34.1 (H) 26.0 - 34.0 pg    MCHC 34.8 31.0 - 36.0 g/dL    RDW 14.1 12.4 - 15.4 %    Platelets 830 505 - 438 K/uL    MPV 8.4 5.0 - 10.5 fL    Neutrophils % 55.7 %    Lymphocytes % 29.1 %    Monocytes % 11.4 %    Eosinophils % 3.0 %    Basophils % 0.8 %    Neutrophils Absolute 3.7 1.7 - 7.7 K/uL    Lymphocytes Absolute 1.9 1.0 - 5.1 K/uL    Monocytes Absolute 0.8 0.0 - 1.3 K/uL    Eosinophils Absolute 0.2 0.0 - 0.6 K/uL    Basophils Absolute 0.1 0.0 - 0.2 K/uL   Protime-INR   Result Value Ref Range    Protime 17.7 (H) 10.0 - 13.2 sec    INR 1.52 (H) 0.86 - 1.14   TYPE AND SCREEN   Result Value Ref Range    ABO/Rh A NEG     Antibody Screen NEG        VL Extremity Venous Left   Final Result      CTA ABDOMEN PELVIS W WO CONTRAST   Final Result   A definite focus of active extravasation is not identified. There is a questionable small focus of active extravasation seen very   distally near the anorectal verge posteriorly. However, that could   potentially represent a small vessel is well. Mild diverticulosis of the large bowel, but without CT evidence of   diverticulitis. Moderate to large hiatal hernia. CLINICAL IMPRESSION  1. Rectal bleeding    2. Anticoagulated    3. Acute renal failure, unspecified acute renal failure type (HCC)        Blood pressure 138/70, pulse 83, temperature 97.9 °F (36.6 °C), temperature source Oral, resp. rate 16, weight 129 lb (58.5 kg), SpO2 98 %. [facility]. was admitted in stable condition. This chart was generated in part by using Dragon Dictation system and may contain errors related to that system including errors in grammar, punctuation, and spelling, as well as words and phrases that may be inappropriate. If there are any questions or concerns please feel free to contact the dictating provider for clarification.      Madeline Robles DO  ATTENDING, [address] [place] [address], DO  09/27/20 3719

## 2020-09-26 NOTE — PROGRESS NOTES
[facility]    9/26/2020 2:52 PM        Name: Krystle East . Admitted: 9/25/2020  Primary Care Provider: Leonel Padilla MD (Tel: [phone])    Brief Course:  81 yo F with Afib on Xarelto, HTN came to ER with rectal bleeding. Admitted as inpatient for rectal bleeding with CHACHA and ABLA. Followed by GI. For colonscopy on 9/28. CC: Rectal bleeding    Subjective:  . Patient with further rectal bleeding noted in toilet in her room. No CP, SOB, fevers or abdominal pain. Reviewed interval ancillary notes    Current Medications  0.9 % sodium chloride infusion, Continuous  dilTIAZem (CARDIZEM CD) extended release capsule 240 mg, Daily  sodium chloride flush 0.9 % injection 10 mL, 2 times per day  sodium chloride flush 0.9 % injection 10 mL, PRN  acetaminophen (TYLENOL) tablet 650 mg, Q6H PRN    Or  acetaminophen (TYLENOL) suppository 650 mg, Q6H PRN  promethazine (PHENERGAN) tablet 12.5 mg, Q6H PRN    Or  ondansetron (ZOFRAN) injection 4 mg, Q6H PRN  pantoprazole (PROTONIX) injection 40 mg, Q12H    And  sodium chloride (PF) 0.9 % injection 10 mL, Q12H  atorvastatin (LIPITOR) tablet 10 mg, Daily  [START ON 9/27/2020] polyethylene glycol (GoLYTELY) solution 4,000 mL, Once        Objective:  /81   Pulse 69   Temp 97.6 °F (36.4 °C) (Temporal)   Resp 17   Ht 5' 1\" (1.549 m)   Wt 127 lb 4.8 oz (57.7 kg)   SpO2 99%   BMI 24.05 kg/m²   No intake or output data in the 24 hours ending 09/26/20 1452   Wt Readings from Last 3 Encounters:   09/26/20 127 lb 4.8 oz (57.7 kg)   09/15/20 129 lb (58.5 kg)   04/02/20 135 lb (61.2 kg)       General appearance:  Appears comfortable  Eyes: Sclera clear. Pupils equal.  ENT: Moist oral mucosa. Trachea midline, no adenopathy. Cardiovascular: Regular rhythm, normal S1, S2. No murmur. No edema in lower extremities  Respiratory: Not using accessory muscles. Good inspiratory effort. Clear to auscultation bilaterally, no wheeze or crackles. GI: Abdomen soft, no tenderness, not distended, normal bowel sounds  Musculoskeletal: No cyanosis in digits, neck supple  Neurology: Grossly intact. No speech or motor deficits  Psych: Normal affect. Alert and oriented in time, place and person  Skin: Warm, dry, normal turgor  Extremity exam shows brisk capillary refill. Peripheral pulses are palpable in lower extremities     Labs and Tests:  CBC:   Recent Labs     09/25/20  1540 09/25/20 2330 09/26/20  0755   WBC 8.3 10.0  --    HGB 12.2 12.1 10.6*    201  --      BMP:    Recent Labs     09/25/20 2330      K 4.1      CO2 23   BUN 27*   CREATININE 1.6*   GLUCOSE 113*     Hepatic:   Recent Labs     09/25/20 2330   AST 27   ALT 16   BILITOT <0.2   ALKPHOS 86     VL Extremity Venous Left         CTA ABDOMEN PELVIS W WO CONTRAST   Final Result   A definite focus of active extravasation is not identified. There is a questionable small focus of active extravasation seen very   distally near the anorectal verge posteriorly. However, that could   potentially represent a small vessel is well. Mild diverticulosis of the large bowel, but without CT evidence of   diverticulitis. Moderate to large hiatal hernia. Problem List  Principal Problem:    Rectal bleeding  Active Problems:    Chronic atrial fibrillation    Essential hypertension    Mixed hyperlipidemia    Acute blood loss anemia    CHACHA (acute kidney injury) ([facility] [address]  Resolved Problems:    * No resolved hospital problems. *       Assessment & Plan:   1. FLD today, CLD tomorrow and NPO p MN tomorrow for colonoscopy on 9/28  2. Stop Xarelto  3. Serial H/H  4. Transfuse if Hg < 8  5. GI consult for rectal bleeding appreciated  6. IVF  7. PT/OT eval for dispo    IV Access: Peripheral  Barros: No  Diet: DIET FULL LIQUID;  DIET CLEAR LIQUID;  Diet NPO, After Midnight Exceptions are:  Ice

## 2020-09-26 NOTE — PROGRESS NOTES
Patient resting in bed at this time. Fluids running, family at bedside. POC is colonoscopy for Monday.

## 2020-09-26 NOTE — CONSULTS
GI Consult Note      Admission Date: 9/25/2020  Hospital Day: Hospital Day: 2  Attending: Eli Cardona MD  Date of service: 9/26/20    Subjective:     Chief complaint/ Reason for consult:   Rectal bleeding      HPI: Shanda Montalvo is a 80 y.o.  female patient, who was seen at the request of Dr. Eli Cardona MD.    History was obtained from chart review and the patient. For the past week, patient noted bright red blood with BM (small amounts). She had one episode of \"dripping blood\" yesterday and presented to the ER. No blood clots. Her Hb 12 (9/25) and dropped to 10 today (9/26). She has mild constipation and takes Miralax as needed. Patient does not recall having a colonoscopy but grand daughter said she had one. She has AF and is on Xarelto. Xarelto was held today 9/26. Past Medical History:     Past Medical History:   Diagnosis Date    Atrial fibrillation ([address]     History of skin cancer     Hyperlipidemia     Hypertension        Past Surgical History:    History reviewed. No pertinent surgical history. Social History:     · Tobacco use:   reports that she has never smoked. She has never used smokeless tobacco.  · Alcohol use:   reports no history of alcohol use. · Currently lives in: [facility]  ·  reports no history of drug use. Family History:       Problem Relation Age of Onset    Heart Disease Neg Hx      No [address] of colon cancer     Medications:    dilTIAZem  240 mg Oral Daily    sodium chloride flush  10 mL Intravenous 2 times per day    pantoprazole  40 mg Intravenous Q12H    And    sodium chloride (PF)  10 mL Intravenous Q12H    atorvastatin  10 mg Oral Daily            REVIEW OF SYSTEMS:       A comprehensive review of systems was negative.     Objective:   PHYSICAL EXAM:      Vitals:   Vitals:    09/25/20 2117 09/26/20 0415   BP: 138/70 (!) 158/79   Pulse: 83 80   Resp: 16 16   Temp: 97.9 °F (36.6 °C) 97.4 °F (36.3 °C) TempSrc: Oral Oral   SpO2: 98% 99%   Weight: 129 lb (58.5 kg) 127 lb 4.8 oz (57.7 kg)   Height:  5' 1\" (1.549 m)       General appearance: alert, cooperative, no distress, appears stated age  Eyes: Anicteric  Head: Normocephalic, without obvious abnormality  Lungs: clear to auscultation bilaterally, Normal Effort  Heart: irregular rate and rhythm, normal S1 and S2, no murmurs or rubs  Abdomen: soft, non-tender. Bowel sounds normal. No masses,  no organomegaly. Extremities: atraumatic, no cyanosis or edema  Skin: warm and dry, no jaundice  Neuro: Grossly intact, A&OX3    Intake and output:   No intake/output data recorded. Lab Data:      CBC:   Recent Labs     09/25/20  1540 09/25/20  2330   WBC 8.3 10.0   RBC 3.75* 3.74*   HGB 12.2 12.1   HCT 36.7 36.7    201   MCV 97.8 98.3   MCH 32.5 32.4   MCHC 33.2 33.0   RDW 14.2 14.1        BMP:  Recent Labs     09/25/20  2330      K 4.1      CO2 23   BUN 27*   CREATININE 1.6*   CALCIUM 9.3   GLUCOSE 113*        Hepatic Function Panel:   Recent Labs     09/25/20  2330   AST 27   ALT 16   BILITOT <0.2   ALKPHOS 86       No results for input(s): LIPASE, AMYLASE in the last 72 hours. Recent Labs     09/25/20  2330   PROTIME 26.2*   INR 2.24*     No results for input(s): PTT in the last 72 hours. No results for input(s): OCCULTBLD in the last 72 hours. Imaging:    CTA ABDOMEN PELVIS W WO CONTRAST   Final Result   A definite focus of active extravasation is not identified. There is a questionable small focus of active extravasation seen very   distally near the anorectal verge posteriorly. However, that could   potentially represent a small vessel is well. Mild diverticulosis of the large bowel, but without CT evidence of   diverticulitis. Moderate to large hiatal hernia.          VL Extremity Venous Left    (Results Pending)         Known drug Allergies:   No Known Allergies        Assessment:   The patient is a 80 y.o. old female with following problems:    Active Problems:    Chronic atrial fibrillation    Essential hypertension    Mixed hyperlipidemia  Resolved Problems:    * No resolved hospital problems. *    Acute blood loss anemia with hematochezia. Hb drifting down   AF, on Xarelto, held 9/25. INR 2.24 (9/25)   Hiatal hernia and colonic diverticulosis on CT     Recommendations:   Monitor H/H, transfuse to keep Hb>8   Check INR. For colonoscopy, INR should be around 1.5  Colonoscopy 9/28. Full liquids today, clear liquids on Sunday, bowel prep tomorrow.        Eulalia Aguilar MD, [address]

## 2020-09-27 LAB
A/G RATIO: 1.4 (ref 1.1–2.2)
ALBUMIN SERPL-MCNC: 3.3 G/DL (ref 3.4–5)
ALP BLD-CCNC: 70 U/L (ref 40–129)
ALT SERPL-CCNC: 12 U/L (ref 10–40)
ANION GAP SERPL CALCULATED.3IONS-SCNC: 9 MMOL/L (ref 3–16)
AST SERPL-CCNC: 19 U/L (ref 15–37)
BASOPHILS ABSOLUTE: 0.1 K/UL (ref 0–0.2)
BASOPHILS RELATIVE PERCENT: 0.8 %
BILIRUB SERPL-MCNC: 0.6 MG/DL (ref 0–1)
BUN BLDV-MCNC: 16 MG/DL (ref 7–20)
CALCIUM SERPL-MCNC: 8.8 MG/DL (ref 8.3–10.6)
CHLORIDE BLD-SCNC: 110 MMOL/L (ref 99–110)
CO2: 22 MMOL/L (ref 21–32)
CREAT SERPL-MCNC: 1.2 MG/DL (ref 0.6–1.2)
EOSINOPHILS ABSOLUTE: 0.2 K/UL (ref 0–0.6)
EOSINOPHILS RELATIVE PERCENT: 3 %
GFR AFRICAN AMERICAN: 51
GFR NON-AFRICAN AMERICAN: 42
GLOBULIN: 2.3 G/DL
GLUCOSE BLD-MCNC: 95 MG/DL (ref 70–99)
HCT VFR BLD CALC: 30.8 % (ref 36–48)
HCT VFR BLD CALC: 31.8 % (ref 36–48)
HCT VFR BLD CALC: 36.4 % (ref 36–48)
HEMOGLOBIN: 10.7 G/DL (ref 12–16)
HEMOGLOBIN: 10.7 G/DL (ref 12–16)
HEMOGLOBIN: 12.3 G/DL (ref 12–16)
INR BLD: 1.52 (ref 0.86–1.14)
IRON SATURATION: 63 % (ref 15–50)
IRON: 135 UG/DL (ref 37–145)
LYMPHOCYTES ABSOLUTE: 1.9 K/UL (ref 1–5.1)
LYMPHOCYTES RELATIVE PERCENT: 29.1 %
MCH RBC QN AUTO: 34.1 PG (ref 26–34)
MCHC RBC AUTO-ENTMCNC: 34.8 G/DL (ref 31–36)
MCV RBC AUTO: 97.9 FL (ref 80–100)
MONOCYTES ABSOLUTE: 0.8 K/UL (ref 0–1.3)
MONOCYTES RELATIVE PERCENT: 11.4 %
NEUTROPHILS ABSOLUTE: 3.7 K/UL (ref 1.7–7.7)
NEUTROPHILS RELATIVE PERCENT: 55.7 %
PDW BLD-RTO: 14.1 % (ref 12.4–15.4)
PLATELET # BLD: 180 K/UL (ref 135–450)
PMV BLD AUTO: 8.4 FL (ref 5–10.5)
POTASSIUM REFLEX MAGNESIUM: 4.2 MMOL/L (ref 3.5–5.1)
PROTHROMBIN TIME: 17.7 SEC (ref 10–13.2)
RBC # BLD: 3.15 M/UL (ref 4–5.2)
SODIUM BLD-SCNC: 141 MMOL/L (ref 136–145)
TOTAL IRON BINDING CAPACITY: 214 UG/DL (ref 260–445)
TOTAL PROTEIN: 5.6 G/DL (ref 6.4–8.2)
WBC # BLD: 6.7 K/UL (ref 4–11)

## 2020-09-27 PROCEDURE — 6370000000 HC RX 637 (ALT 250 FOR IP): Performed by: HOSPITALIST

## 2020-09-27 PROCEDURE — 83540 ASSAY OF IRON: CPT

## 2020-09-27 PROCEDURE — 85025 COMPLETE CBC W/AUTO DIFF WBC: CPT

## 2020-09-27 PROCEDURE — 80053 COMPREHEN METABOLIC PANEL: CPT

## 2020-09-27 PROCEDURE — 6360000002 HC RX W HCPCS: Performed by: HOSPITALIST

## 2020-09-27 PROCEDURE — 85610 PROTHROMBIN TIME: CPT

## 2020-09-27 PROCEDURE — 85018 HEMOGLOBIN: CPT

## 2020-09-27 PROCEDURE — C9113 INJ PANTOPRAZOLE SODIUM, VIA: HCPCS | Performed by: HOSPITALIST

## 2020-09-27 PROCEDURE — 6370000000 HC RX 637 (ALT 250 FOR IP): Performed by: INTERNAL MEDICINE

## 2020-09-27 PROCEDURE — 85014 HEMATOCRIT: CPT

## 2020-09-27 PROCEDURE — 2060000000 HC ICU INTERMEDIATE R&B

## 2020-09-27 PROCEDURE — 2580000003 HC RX 258: Performed by: HOSPITALIST

## 2020-09-27 PROCEDURE — 36415 COLL VENOUS BLD VENIPUNCTURE: CPT

## 2020-09-27 PROCEDURE — 83550 IRON BINDING TEST: CPT

## 2020-09-27 RX ADMIN — PANTOPRAZOLE SODIUM 40 MG: 40 INJECTION, POWDER, FOR SOLUTION INTRAVENOUS at 17:55

## 2020-09-27 RX ADMIN — Medication 10 ML: at 17:55

## 2020-09-27 RX ADMIN — Medication 10 ML: at 06:04

## 2020-09-27 RX ADMIN — SODIUM CHLORIDE: 9 INJECTION, SOLUTION INTRAVENOUS at 17:48

## 2020-09-27 RX ADMIN — PANTOPRAZOLE SODIUM 40 MG: 40 INJECTION, POWDER, FOR SOLUTION INTRAVENOUS at 05:21

## 2020-09-27 RX ADMIN — DILTIAZEM HYDROCHLORIDE 240 MG: 240 CAPSULE, EXTENDED RELEASE ORAL at 09:32

## 2020-09-27 RX ADMIN — Medication 10 ML: at 21:00

## 2020-09-27 RX ADMIN — POLYETHYLENE GLYCOL 3350, SODIUM SULFATE ANHYDROUS, SODIUM BICARBONATE, SODIUM CHLORIDE, POTASSIUM CHLORIDE 4000 ML: 236; 22.74; 6.74; 5.86; 2.97 POWDER, FOR SOLUTION ORAL at 18:00

## 2020-09-27 RX ADMIN — SODIUM CHLORIDE: 9 INJECTION, SOLUTION INTRAVENOUS at 05:21

## 2020-09-27 ASSESSMENT — PAIN SCALES - GENERAL: PAINLEVEL_OUTOF10: 0

## 2020-09-27 NOTE — PROGRESS NOTES
[facility]    9/27/2020 12:46 PM        Name: Trini Gresham . Admitted: 9/25/2020  Primary Care Provider: Nan Florez MD (Tel: [phone])    Brief Course:  81 yo F with Afib on Xarelto, HTN came to ER with rectal bleeding. Admitted as inpatient for rectal bleeding with CHACHA and ABLA. Followed by GI. For colonscopy on 9/28. CC: Rectal bleeding    Subjective:  . Patient with denies rectal bleeding. No CP, SOB, fevers or abdominal pain. Reviewed interval ancillary notes    Current Medications  0.9 % sodium chloride infusion, Continuous  dilTIAZem (CARDIZEM CD) extended release capsule 240 mg, Daily  sodium chloride flush 0.9 % injection 10 mL, 2 times per day  sodium chloride flush 0.9 % injection 10 mL, PRN  acetaminophen (TYLENOL) tablet 650 mg, Q6H PRN    Or  acetaminophen (TYLENOL) suppository 650 mg, Q6H PRN  promethazine (PHENERGAN) tablet 12.5 mg, Q6H PRN    Or  ondansetron (ZOFRAN) injection 4 mg, Q6H PRN  pantoprazole (PROTONIX) injection 40 mg, Q12H    And  sodium chloride (PF) 0.9 % injection 10 mL, Q12H  atorvastatin (LIPITOR) tablet 10 mg, Daily  polyethylene glycol (GoLYTELY) solution 4,000 mL, Once        Objective:  BP (!) 158/86   Pulse 90   Temp 98.2 °F (36.8 °C) (Temporal)   Resp 17   Ht 5' 1\" (1.549 m)   Wt 127 lb 4.8 oz (57.7 kg)   SpO2 97%   BMI 24.05 kg/m²     Intake/Output Summary (Last 24 hours) at 9/27/2020 1246  Last data filed at 9/27/2020 0802  Gross per 24 hour   Intake 300 ml   Output --   Net 300 ml      Wt Readings from Last 3 Encounters:   09/26/20 127 lb 4.8 oz (57.7 kg)   09/15/20 129 lb (58.5 kg)   04/02/20 135 lb (61.2 kg)       General appearance:  Appears comfortable, sitting in chair  Eyes: Sclera clear. Pupils equal.  ENT: Moist oral mucosa. Trachea midline, no adenopathy. Cardiovascular: Regular rhythm, normal S1, S2. No murmur.  No edema in lower extremities  Respiratory: Not using accessory muscles. Good inspiratory effort. Clear to auscultation bilaterally, no wheeze or crackles. GI: Abdomen soft, no tenderness, not distended, normal bowel sounds  Musculoskeletal: No cyanosis in digits, neck supple  Neurology: Grossly intact. No speech or motor deficits  Psych: Normal affect. Alert and oriented in time, place and person  Skin: Warm, dry, normal turgor  Extremity exam shows brisk capillary refill. Peripheral pulses are palpable in lower extremities     Labs and Tests:  CBC:   Recent Labs     09/25/20  1540 09/25/20  2330  09/26/20  2342 09/27/20  0449 09/27/20  1131   WBC 8.3 10.0  --   --  6.7  --    HGB 12.2 12.1   < > 10.1* 10.7* 12.3    201  --   --  180  --     < > = values in this interval not displayed. BMP:    Recent Labs     09/25/20 2330 09/27/20  0448    141   K 4.1 4.2    110   CO2 23 22   BUN 27* 16   CREATININE 1.6* 1.2   GLUCOSE 113* 95     Hepatic:   Recent Labs     09/25/20  2330 09/27/20  0448   AST 27 19   ALT 16 12   BILITOT <0.2 0.6   ALKPHOS 86 70     VL Extremity Venous Left   Final Result      CTA ABDOMEN PELVIS W WO CONTRAST   Final Result   A definite focus of active extravasation is not identified. There is a questionable small focus of active extravasation seen very   distally near the anorectal verge posteriorly. However, that could   potentially represent a small vessel is well. Mild diverticulosis of the large bowel, but without CT evidence of   diverticulitis. Moderate to large hiatal hernia. Problem List  Principal Problem:    Rectal bleeding  Active Problems:    Chronic atrial fibrillation    Essential hypertension    Mixed hyperlipidemia    Acute blood loss anemia    CHACHA (acute kidney injury) ([facility] [address]  Resolved Problems:    * No resolved hospital problems. *       Assessment & Plan:   1. CLD today and NPO p MN for colonoscopy on 9/28  2.  Holding Xarelto; resume when/if ok with GI  3. Serial H/H  4. Transfuse if Hg < 8  5. GI consult for rectal bleeding appreciated  6. Cont IVF  7. PT/OT eval for dispo    IV Access: Peripheral  Barros: No  Diet: DIET CLEAR LIQUID;  Diet NPO, After Midnight Exceptions are: Ice Chips  Code:Full Code  DVT PPX SCD  Disposition Home    Discussed with patient and nursing. Colonoscopy tomorrow. Decision to resume Xarelto per GI. Hopefully home in next 48 hrs.     Rubi Fam MD   9/27/2020 12:46 PM

## 2020-09-27 NOTE — PROGRESS NOTES
[facility]  Gastroenterology Progress Note    Roberto Dykes is a 80 y.o. female patient. 1. Rectal bleeding    2. Anticoagulated    3. Acute renal failure, unspecified acute renal failure type ([address]        SUBJECTIVE:    No BM or rectal bleeding since yesterday  Patient feels well     ROS:  Cardiovascular ROS: no chest pain or dyspnea on exertion  Gastrointestinal ROS: see above  Respiratory ROS: no cough, shortness of breath, or wheezing    Physical    VITALS:  BP (!) 147/78   Pulse 75   Temp 97.6 °F (36.4 °C) (Temporal)   Resp 16   Ht 5' 1\" (1.549 m)   Wt 127 lb 4.8 oz (57.7 kg)   SpO2 96%   BMI 24.05 kg/m²   TEMPERATURE:  Current - Temp: 97.6 °F (36.4 °C); Max - Temp  Av.6 °F (36.4 °C)  Min: 97.2 °F (36.2 °C)  Max: 97.8 °F (36.6 °C)    NAD  RRR, Nl s1s2  Lungs CTA Bilaterally, normal effort  Abdomen soft, ND, NT, no HSM, Bowel sounds normal  AAOx3, No asterixis     Data      CBC:   Recent Labs     20  1540 20  2330  20  1512 20  2342 20  0449   WBC 8.3 10.0  --   --   --  6.7   RBC 3.75* 3.74*  --   --   --  3.15*   HGB 12.2 12.1   < > 10.3* 10.1* 10.7*   HCT 36.7 36.7   < > 30.4* 30.1* 30.8*    201  --   --   --  180   MCV 97.8 98.3  --   --   --  97.9   MCH 32.5 32.4  --   --   --  34.1*   MCHC 33.2 33.0  --   --   --  34.8   RDW 14.2 14.1  --   --   --  14.1    < > = values in this interval not displayed. BMP:  Recent Labs     20  23320  0448    141   K 4.1 4.2    110   CO2 23 22   BUN 27* 16   CREATININE 1.6* 1.2   CALCIUM 9.3 8.8   GLUCOSE 113* 95        Hepatic Function Panel:   Recent Labs     20  2330 20  0448   AST 27 19   ALT 16 12   BILITOT <0.2 0.6   ALKPHOS 86 70       No results for input(s): LIPASE, AMYLASE in the last 72 hours. Recent Labs     20  0449   PROTIME 26.2* 17.7*   INR 2.24* 1.52*     No results for input(s): PTT in the last 72 hours.   No results for input(s): OCCULTBLD in the last 72 hours. Radiology Review:    VL Extremity Venous Left   Final Result      CTA ABDOMEN PELVIS W WO CONTRAST   Final Result   A definite focus of active extravasation is not identified. There is a questionable small focus of active extravasation seen very   distally near the anorectal verge posteriorly. However, that could   potentially represent a small vessel is well. Mild diverticulosis of the large bowel, but without CT evidence of   diverticulitis. Moderate to large hiatal hernia.                Assessment:   The patient is a 80 y.o. old female  with following problems:     Active Problems:    Chronic atrial fibrillation    Essential hypertension    Mixed hyperlipidemia  Resolved Problems:    * No resolved hospital problems. *     Acute blood loss anemia with hematochezia. Hb drifting down   AF, on Xarelto, held 9/25. INR 2.24 (9/25)   Hiatal hernia and colonic diverticulosis on CT      Recommendations:   Monitor H/H, transfuse to keep Hb>8   Check INR. For colonoscopy, INR should be around 1.5  Colonoscopy 9/28.  Clear liquids and bowel prep orders are in     Haleigh Power MD, MSc  [address]

## 2020-09-28 ENCOUNTER — ANESTHESIA (OUTPATIENT)
Dept: ENDOSCOPY | Age: 85
DRG: 378 | End: 2020-09-28
Payer: MEDICARE

## 2020-09-28 ENCOUNTER — ANESTHESIA EVENT (OUTPATIENT)
Dept: ENDOSCOPY | Age: 85
DRG: 378 | End: 2020-09-28
Payer: MEDICARE

## 2020-09-28 VITALS — OXYGEN SATURATION: 100 % | DIASTOLIC BLOOD PRESSURE: 55 MMHG | SYSTOLIC BLOOD PRESSURE: 96 MMHG

## 2020-09-28 LAB
ANION GAP SERPL CALCULATED.3IONS-SCNC: 10 MMOL/L (ref 3–16)
ANION GAP SERPL CALCULATED.3IONS-SCNC: 9 MMOL/L (ref 3–16)
BASOPHILS ABSOLUTE: 0 K/UL (ref 0–0.2)
BASOPHILS ABSOLUTE: 0 K/UL (ref 0–0.2)
BASOPHILS RELATIVE PERCENT: 0.4 %
BASOPHILS RELATIVE PERCENT: 0.7 %
BUN BLDV-MCNC: 12 MG/DL (ref 7–20)
BUN BLDV-MCNC: 12 MG/DL (ref 7–20)
CALCIUM SERPL-MCNC: 8.6 MG/DL (ref 8.3–10.6)
CALCIUM SERPL-MCNC: 8.7 MG/DL (ref 8.3–10.6)
CHLORIDE BLD-SCNC: 108 MMOL/L (ref 99–110)
CHLORIDE BLD-SCNC: 109 MMOL/L (ref 99–110)
CO2: 24 MMOL/L (ref 21–32)
CO2: 24 MMOL/L (ref 21–32)
CREAT SERPL-MCNC: 1.1 MG/DL (ref 0.6–1.2)
CREAT SERPL-MCNC: 1.1 MG/DL (ref 0.6–1.2)
EOSINOPHILS ABSOLUTE: 0.1 K/UL (ref 0–0.6)
EOSINOPHILS ABSOLUTE: 0.2 K/UL (ref 0–0.6)
EOSINOPHILS RELATIVE PERCENT: 1.4 %
EOSINOPHILS RELATIVE PERCENT: 2.6 %
GFR AFRICAN AMERICAN: 56
GFR AFRICAN AMERICAN: 56
GFR NON-AFRICAN AMERICAN: 46
GFR NON-AFRICAN AMERICAN: 46
GLUCOSE BLD-MCNC: 83 MG/DL (ref 70–99)
GLUCOSE BLD-MCNC: 92 MG/DL (ref 70–99)
HCT VFR BLD CALC: 30.8 % (ref 36–48)
HCT VFR BLD CALC: 32.1 % (ref 36–48)
HEMOGLOBIN: 10.5 G/DL (ref 12–16)
HEMOGLOBIN: 10.6 G/DL (ref 12–16)
INR BLD: 1.45 (ref 0.86–1.14)
LYMPHOCYTES ABSOLUTE: 1 K/UL (ref 1–5.1)
LYMPHOCYTES ABSOLUTE: 1.7 K/UL (ref 1–5.1)
LYMPHOCYTES RELATIVE PERCENT: 11.5 %
LYMPHOCYTES RELATIVE PERCENT: 27.8 %
MCH RBC QN AUTO: 32.6 PG (ref 26–34)
MCH RBC QN AUTO: 33.8 PG (ref 26–34)
MCHC RBC AUTO-ENTMCNC: 33.2 G/DL (ref 31–36)
MCHC RBC AUTO-ENTMCNC: 34.1 G/DL (ref 31–36)
MCV RBC AUTO: 98.2 FL (ref 80–100)
MCV RBC AUTO: 99.1 FL (ref 80–100)
MONOCYTES ABSOLUTE: 0.7 K/UL (ref 0–1.3)
MONOCYTES ABSOLUTE: 0.8 K/UL (ref 0–1.3)
MONOCYTES RELATIVE PERCENT: 12 %
MONOCYTES RELATIVE PERCENT: 9.2 %
NEUTROPHILS ABSOLUTE: 3.5 K/UL (ref 1.7–7.7)
NEUTROPHILS ABSOLUTE: 7 K/UL (ref 1.7–7.7)
NEUTROPHILS RELATIVE PERCENT: 56.9 %
NEUTROPHILS RELATIVE PERCENT: 77.5 %
PDW BLD-RTO: 13.7 % (ref 12.4–15.4)
PDW BLD-RTO: 13.8 % (ref 12.4–15.4)
PLATELET # BLD: 178 K/UL (ref 135–450)
PLATELET # BLD: 189 K/UL (ref 135–450)
PMV BLD AUTO: 8.3 FL (ref 5–10.5)
PMV BLD AUTO: 9 FL (ref 5–10.5)
POTASSIUM REFLEX MAGNESIUM: 3.8 MMOL/L (ref 3.5–5.1)
POTASSIUM SERPL-SCNC: 3.8 MMOL/L (ref 3.5–5.1)
PROTHROMBIN TIME: 16.9 SEC (ref 10–13.2)
RBC # BLD: 3.1 M/UL (ref 4–5.2)
RBC # BLD: 3.27 M/UL (ref 4–5.2)
SODIUM BLD-SCNC: 142 MMOL/L (ref 136–145)
SODIUM BLD-SCNC: 142 MMOL/L (ref 136–145)
WBC # BLD: 6.2 K/UL (ref 4–11)
WBC # BLD: 9.1 K/UL (ref 4–11)

## 2020-09-28 PROCEDURE — 2500000003 HC RX 250 WO HCPCS: Performed by: NURSE ANESTHETIST, CERTIFIED REGISTERED

## 2020-09-28 PROCEDURE — 3E0H8KZ INTRODUCTION OF OTHER DIAGNOSTIC SUBSTANCE INTO LOWER GI, VIA NATURAL OR ARTIFICIAL OPENING ENDOSCOPIC: ICD-10-PCS | Performed by: INTERNAL MEDICINE

## 2020-09-28 PROCEDURE — 7100000000 HC PACU RECOVERY - FIRST 15 MIN: Performed by: INTERNAL MEDICINE

## 2020-09-28 PROCEDURE — 3609010600 HC COLONOSCOPY POLYPECTOMY SNARE/COLD BIOPSY: Performed by: INTERNAL MEDICINE

## 2020-09-28 PROCEDURE — 97161 PT EVAL LOW COMPLEX 20 MIN: CPT

## 2020-09-28 PROCEDURE — 2060000000 HC ICU INTERMEDIATE R&B

## 2020-09-28 PROCEDURE — 85610 PROTHROMBIN TIME: CPT

## 2020-09-28 PROCEDURE — 6370000000 HC RX 637 (ALT 250 FOR IP): Performed by: HOSPITALIST

## 2020-09-28 PROCEDURE — 2580000003 HC RX 258: Performed by: INTERNAL MEDICINE

## 2020-09-28 PROCEDURE — 80048 BASIC METABOLIC PNL TOTAL CA: CPT

## 2020-09-28 PROCEDURE — 3700000001 HC ADD 15 MINUTES (ANESTHESIA): Performed by: INTERNAL MEDICINE

## 2020-09-28 PROCEDURE — 97116 GAIT TRAINING THERAPY: CPT

## 2020-09-28 PROCEDURE — 6360000002 HC RX W HCPCS: Performed by: NURSE ANESTHETIST, CERTIFIED REGISTERED

## 2020-09-28 PROCEDURE — 3609019800 HC COLONOSCOPY WITH SUBMUCOSAL INJECTION: Performed by: INTERNAL MEDICINE

## 2020-09-28 PROCEDURE — 97535 SELF CARE MNGMENT TRAINING: CPT

## 2020-09-28 PROCEDURE — 36415 COLL VENOUS BLD VENIPUNCTURE: CPT

## 2020-09-28 PROCEDURE — 2580000003 HC RX 258: Performed by: NURSE ANESTHETIST, CERTIFIED REGISTERED

## 2020-09-28 PROCEDURE — 6360000002 HC RX W HCPCS: Performed by: HOSPITALIST

## 2020-09-28 PROCEDURE — 2709999900 HC NON-CHARGEABLE SUPPLY: Performed by: INTERNAL MEDICINE

## 2020-09-28 PROCEDURE — 0DBN8ZX EXCISION OF SIGMOID COLON, VIA NATURAL OR ARTIFICIAL OPENING ENDOSCOPIC, DIAGNOSTIC: ICD-10-PCS | Performed by: INTERNAL MEDICINE

## 2020-09-28 PROCEDURE — C9113 INJ PANTOPRAZOLE SODIUM, VIA: HCPCS | Performed by: HOSPITALIST

## 2020-09-28 PROCEDURE — 88305 TISSUE EXAM BY PATHOLOGIST: CPT

## 2020-09-28 PROCEDURE — 85025 COMPLETE CBC W/AUTO DIFF WBC: CPT

## 2020-09-28 PROCEDURE — 2580000003 HC RX 258: Performed by: HOSPITALIST

## 2020-09-28 PROCEDURE — 7100000001 HC PACU RECOVERY - ADDTL 15 MIN: Performed by: INTERNAL MEDICINE

## 2020-09-28 PROCEDURE — 2720000010 HC SURG SUPPLY STERILE: Performed by: INTERNAL MEDICINE

## 2020-09-28 PROCEDURE — 97165 OT EVAL LOW COMPLEX 30 MIN: CPT

## 2020-09-28 PROCEDURE — 3700000000 HC ANESTHESIA ATTENDED CARE: Performed by: INTERNAL MEDICINE

## 2020-09-28 RX ORDER — PROPOFOL 10 MG/ML
INJECTION, EMULSION INTRAVENOUS CONTINUOUS PRN
Status: DISCONTINUED | OUTPATIENT
Start: 2020-09-28 | End: 2020-09-28 | Stop reason: SDUPTHER

## 2020-09-28 RX ORDER — PROPOFOL 10 MG/ML
INJECTION, EMULSION INTRAVENOUS PRN
Status: DISCONTINUED | OUTPATIENT
Start: 2020-09-28 | End: 2020-09-28 | Stop reason: SDUPTHER

## 2020-09-28 RX ORDER — SODIUM CHLORIDE 9 MG/ML
INJECTION, SOLUTION INTRAVENOUS CONTINUOUS
Status: DISCONTINUED | OUTPATIENT
Start: 2020-09-28 | End: 2020-09-29

## 2020-09-28 RX ORDER — LIDOCAINE HYDROCHLORIDE 20 MG/ML
INJECTION, SOLUTION INFILTRATION; PERINEURAL PRN
Status: DISCONTINUED | OUTPATIENT
Start: 2020-09-28 | End: 2020-09-28 | Stop reason: SDUPTHER

## 2020-09-28 RX ORDER — PROPOFOL 10 MG/ML
INJECTION, EMULSION INTRAVENOUS PRN
Status: DISCONTINUED | OUTPATIENT
Start: 2020-09-28 | End: 2020-09-28

## 2020-09-28 RX ORDER — SODIUM CHLORIDE 9 MG/ML
INJECTION, SOLUTION INTRAVENOUS CONTINUOUS PRN
Status: DISCONTINUED | OUTPATIENT
Start: 2020-09-28 | End: 2020-09-28 | Stop reason: SDUPTHER

## 2020-09-28 RX ORDER — LOVASTATIN 40 MG/1
40 TABLET ORAL NIGHTLY
Status: DISCONTINUED | OUTPATIENT
Start: 2020-09-28 | End: 2020-10-02 | Stop reason: HOSPADM

## 2020-09-28 RX ADMIN — SODIUM CHLORIDE: 9 INJECTION, SOLUTION INTRAVENOUS at 08:39

## 2020-09-28 RX ADMIN — LOVASTATIN 40 MG: 40 TABLET ORAL at 22:04

## 2020-09-28 RX ADMIN — PROPOFOL 120 MCG/KG/MIN: 10 INJECTION, EMULSION INTRAVENOUS at 09:15

## 2020-09-28 RX ADMIN — SODIUM CHLORIDE: 9 INJECTION, SOLUTION INTRAVENOUS at 04:40

## 2020-09-28 RX ADMIN — SODIUM CHLORIDE: 9 INJECTION, SOLUTION INTRAVENOUS at 08:55

## 2020-09-28 RX ADMIN — LIDOCAINE HYDROCHLORIDE 60 MG: 20 INJECTION, SOLUTION INFILTRATION; PERINEURAL at 09:15

## 2020-09-28 RX ADMIN — Medication 10 ML: at 22:05

## 2020-09-28 RX ADMIN — DILTIAZEM HYDROCHLORIDE 240 MG: 240 CAPSULE, EXTENDED RELEASE ORAL at 12:42

## 2020-09-28 RX ADMIN — Medication 10 ML: at 04:41

## 2020-09-28 RX ADMIN — PROPOFOL 10 MG: 10 INJECTION, EMULSION INTRAVENOUS at 09:18

## 2020-09-28 RX ADMIN — PANTOPRAZOLE SODIUM 40 MG: 40 INJECTION, POWDER, FOR SOLUTION INTRAVENOUS at 18:48

## 2020-09-28 RX ADMIN — Medication 10 ML: at 18:49

## 2020-09-28 RX ADMIN — SODIUM CHLORIDE: 9 INJECTION, SOLUTION INTRAVENOUS at 18:46

## 2020-09-28 RX ADMIN — PROPOFOL 40 MG: 10 INJECTION, EMULSION INTRAVENOUS at 09:15

## 2020-09-28 RX ADMIN — PANTOPRAZOLE SODIUM 40 MG: 40 INJECTION, POWDER, FOR SOLUTION INTRAVENOUS at 04:40

## 2020-09-28 ASSESSMENT — PAIN SCALES - GENERAL
PAINLEVEL_OUTOF10: 0

## 2020-09-28 ASSESSMENT — PAIN - FUNCTIONAL ASSESSMENT: PAIN_FUNCTIONAL_ASSESSMENT: 0-10

## 2020-09-28 NOTE — PROGRESS NOTES
Patient's awake and alert. On room air. VSS. Patient meets criteria to be discharged from phase 1.  Will prepare for transfer to room    Electronically signed by Claribel James RN on 9/28/2020 at [phone]

## 2020-09-28 NOTE — ANESTHESIA POSTPROCEDURE EVALUATION
Department of Anesthesiology  Postprocedure Note    Patient: Darrel Rosas  MRN: [id number]  YOB: 1928  Date of evaluation: 9/28/2020  Time:  1:17 PM     Procedure Summary     Date:  09/28/20 Room / Location:  [address] [place] [address]    Anesthesia Start:  9960 Anesthesia Stop:  6912    Procedures:       COLONOSCOPY POLYPECTOMY SNARE/COLD BIOPSY (N/A )      COLONOSCOPY SUBMUCOSAL/BOTOX INJECTION Diagnosis:  (rectal bleeding)    Surgeon:  Monica Meza MD Responsible Provider:  Julien Granados MD    Anesthesia Type:  MAC ASA Status:  3          Anesthesia Type: MAC    Leroy Phase I: Leroy Score: 10    Leroy Phase II:      Last vitals: Reviewed and per EMR flowsheets.        Anesthesia Post Evaluation    Patient location during evaluation: PACU  Complications: no  Cardiovascular status: hemodynamically stable  Respiratory status: acceptable

## 2020-09-28 NOTE — PROGRESS NOTES
Patient arrived from endo to pacu. On 3L n/c. afib on the monitor.   VSS    Electronically signed by Enoch Pascual RN on 9/28/2020 at 0561

## 2020-09-28 NOTE — PROGRESS NOTES
[facility]    9/28/2020 12:07 PM        Name: Ondina Marks . Admitted: 9/25/2020  Primary Care Provider: Domingo Clifford MD (Tel: [phone])        Subjective:    Patient lying in bed no chest pain no sob     Reviewed interval ancillary notes    Current Medications  0.9 % sodium chloride infusion, Continuous  0.9 % sodium chloride infusion, Continuous  dilTIAZem (CARDIZEM CD) extended release capsule 240 mg, Daily  sodium chloride flush 0.9 % injection 10 mL, 2 times per day  sodium chloride flush 0.9 % injection 10 mL, PRN  acetaminophen (TYLENOL) tablet 650 mg, Q6H PRN    Or  acetaminophen (TYLENOL) suppository 650 mg, Q6H PRN  promethazine (PHENERGAN) tablet 12.5 mg, Q6H PRN    Or  ondansetron (ZOFRAN) injection 4 mg, Q6H PRN  pantoprazole (PROTONIX) injection 40 mg, Q12H    And  sodium chloride (PF) 0.9 % injection 10 mL, Q12H  atorvastatin (LIPITOR) tablet 10 mg, Daily        Objective:  /77   Pulse 78   Temp 97.9 °F (36.6 °C) (Temporal)   Resp 18   Ht 5' 1\" (1.549 m)   Wt 127 lb 4.8 oz (57.7 kg)   SpO2 97%   BMI 24.05 kg/m²     Intake/Output Summary (Last 24 hours) at 9/28/2020 1207  Last data filed at 9/28/2020 0948  Gross per 24 hour   Intake 3725.21 ml   Output --   Net 3725.21 ml      Wt Readings from Last 3 Encounters:   09/26/20 127 lb 4.8 oz (57.7 kg)   09/15/20 129 lb (58.5 kg)   04/02/20 135 lb (61.2 kg)       General appearance:  Appears comfortable. AAOx3  HEENT: atraumatic, Pupils equal, muscous membranes moist, no masses appreciated  Cardiovascular: Regular rate and rhythm no murmurs appreciated  Respiratory: CTAB no wheezing  Gastrointestinal: Abdomen soft, non-tender, BS+  EXT: no edema  Neurology: no gross focal deficts  Psychiatry: Appropriate affect.  Not agitated  Skin: Warm, dry, no rashes appreciated    Labs and Tests:  CBC:   Recent Labs     09/25/20  2330 09/27/20  0449 09/27/20  1131 09/27/20  2239 09/28/20 0445   WBC 10.0  --  6.7  --   --  6.2   HGB 12.1   < > 10.7* 12.3 10.7* 10.5*     --  180  --   --  178    < > = values in this interval not displayed. BMP:    Recent Labs     09/25/20  2330 09/27/20 0448 09/28/20 0445    141 142   K 4.1 4.2 3.8    110 109   CO2 23 22 24   BUN 27* 16 12   CREATININE 1.6* 1.2 1.1   GLUCOSE 113* 95 92     Hepatic:   Recent Labs     09/25/20 2330 09/27/20 0448   AST 27 19   ALT 16 12   BILITOT <0.2 0.6   ALKPHOS 86 70     VL Extremity Venous Left   Final Result      CTA ABDOMEN PELVIS W WO CONTRAST   Final Result   A definite focus of active extravasation is not identified. There is a questionable small focus of active extravasation seen very   distally near the anorectal verge posteriorly. However, that could   potentially represent a small vessel is well. Mild diverticulosis of the large bowel, but without CT evidence of   diverticulitis. Moderate to large hiatal hernia. Recent imaging reviewed    Problem List  Principal Problem:    Rectal bleeding  Active Problems:    Chronic atrial fibrillation    Essential hypertension    Mixed hyperlipidemia    Acute blood loss anemia    RONY (acute kidney injury) ([address]  Resolved Problems:    * No resolved hospital problems. *       Assessment & Plan:   Acute blood loss anemia secondary to gi bleed s/p colonscopy with Poor prep. No active lower GI bleed. 18 mm sessile AC polyp (hot snare via piecemeal resection), s/p clips X 4 and tattooed. Diminutive descending colon polyp.  Sigmoid diverticulosis and hemorrhoids (likely cause of recent bleed)  - monitor hgb transfuse to keep >7  - hold xarelto until ok with gi    Rony: improved    Chronic afib: home meds, hold xarelto for now  Diet: DIET DENTAL SOFT;  Code:Full Code  Disposition home in 2-3 days if tolerates xarelto when ok with Jaycob Tobias MD   9/28/2020 12:07 PM

## 2020-09-28 NOTE — PROGRESS NOTES
renal failure, unspecified acute renal failure type [facility]) were also pertinent to this visit. has a past medical history of Atrial fibrillation ([address] History of skin cancer, Hyperlipidemia, and Hypertension. has a past surgical history that includes Colonoscopy (N/A, 9/28/2020) and Colonoscopy (9/28/2020). Restrictions  Restrictions/Precautions  Restrictions/Precautions: Fall Risk, Modified Diet(Medium fall risk; Diet dental soft)  Required Braces or Orthoses?: No  Position Activity Restriction  Other position/activity restrictions: 80 y.o. female who is anticoagulated on Xarelto for atrial fibrillation history who presents to the emergency department with complaints of intermittent rectal bleeding for about 10 days. It was only with bowel movements but states that today now she has having rectal bleeding without any bowel movements. She does not report any abdominal or rectal pain with this.   She does have history of hemorrhoids but has never had bleeding hemorrhoids in the past.  Vision/Hearing        Subjective  General  Chart Reviewed: Yes  Family / Caregiver Present: No  Diagnosis: Rectal bleeding  Follows Commands: Within Functional Limits  General Comment  Comments: Pt was found laying in bed awake  Subjective  Subjective: Pt reports that she is not having any pain, dizziness, or drowsiness after being sedated earlier today  Pain Screening  Patient Currently in Pain: Denies  Vital Signs  Patient Currently in Pain: Denies     Orientation  Orientation  Overall Orientation Status: Within Functional Limits  Social/Functional History  Social/Functional History  Lives With: Family(granddaughter)  Type of Home: House  Home Layout: Two level  Home Access: Stairs to enter with rails  Entrance Stairs - Number of Steps: 1 YARELIS front entrance; uses backdoor with 1-2 YARELIS with no handrails  Entrance Stairs - Rails: None  Bathroom Shower/Tub: Tub only, Shower chair without back  Bathroom Toilet: Standard  Home Equipment: Rolling walker  ADL Assistance: Independent  Homemaking Assistance: Needs assistance(Granddaughter does cooking and laundry. PT does microwaveable meal,)  Homemaking Responsibilities: Yes  Ambulation Assistance: Independent  Transfer Assistance: Independent  Active : No  Leisure & Hobbies: plays with dogs  Additional Comments: No falls in past 6 months  Cognition      Objective     Observation/Palpation  Posture: Good  Observation: Pt is kyphotic throughout throacic spine    AROM RLE (degrees)  RLE AROM: WFL  AROM LLE (degrees)  LLE AROM : WFL  Strength RLE  Strength RLE: WFL  Comment: Grossly 5/5  Strength LLE  Strength LLE: WFL  Comment: Grossly 5/5     Sensation  Overall Sensation Status: WFL  Bed mobility  Supine to Sit: Supervision  Scooting: Supervision  Transfers  Sit to Stand: Stand by assistance  Stand to sit: Stand by assistance  Ambulation  Ambulation?: Yes  Ambulation 1  Surface: level tile  Device: No Device  Assistance: Stand by assistance  Quality of Gait: Pt showed good yao, step length, and swing through gait pattern. No deviations observed. Distance: 140 feet  Stairs/Curb  Stairs?: No     Balance  Posture: Good  Sitting - Static: Good  Sitting - Dynamic: Good  Standing - Static: Good  Standing - Dynamic: Good(Able to reach outside of ROBERT without issue.)      Plan   Plan  Times per week: 3-5x/week  Times per day: Daily  Current Treatment Recommendations: Endurance Training  Safety Devices  Type of devices:  All fall risk precautions in place, Call light within reach, Left in chair, Nurse notified    AM-PAC Score  AM-PAC Inpatient Mobility Raw Score : 21 (09/28/20 1359)  AM-PAC Inpatient T-Scale Score : 50.25 (09/28/20 [facility]9)  Mobility Inpatient CMS 0-100% Score: 28.97 (09/28/20 [facility]9)  Mobility Inpatient CMS G-Code Modifier : Kimberly Weaver (09/28/20 [facility]9)        Goals  Short term goals  Short term goal 1: Bed mobility independent  Short term goal 2: All transfers independent  Short term goal 3: Ambulation of 50 feet independent  Short term goal 4: Ambulation of steps (2) independent  Patient Goals   Patient goals : Pt will return to home at baseline function     Therapy Time   Individual Concurrent Group Co-treatment   Time In 1308         Time Out 1338         Minutes 30         Timed Code Treatment Minutes: [address], SPT  Theo Lyon, PT   Therapist observed and directed the above evaluation.  Thanks, Theo Lyon [place], DPT [id number]

## 2020-09-28 NOTE — PROGRESS NOTES
report any abdominal or rectal pain with this. She does have history of hemorrhoids but has never had bleeding hemorrhoids in the past.    Subjective   General  Chart Reviewed: Yes  Additional Pertinent Hx: hypertension, hyperlipidemia, chronic Afib, ABLA  Family / Caregiver Present: No  Diagnosis: Rectal bleeding  Subjective  Subjective: Pt supine in bed upon arrival, agreeable to therapy. Pt denies pain. Patient Currently in Pain: Denies    Social/Functional History  Social/Functional History  Lives With: Family(granddaughter)  Type of Home: House  Home Layout: Two level  Home Access: Stairs to enter with rails  Entrance Stairs - Number of Steps: 1 YARELIS front entrance; uses backdoor with 1-2 YARELIS with no handrails  Entrance Stairs - Rails: None  Bathroom Shower/Tub: Tub only, Shower chair without back  Bathroom Toilet: Standard  Home Equipment: Rolling walker  ADL Assistance: Independent  Homemaking Assistance: Needs assistance(Granddaughter does cooking and laundry.  PT does microwaveable meal,)  Homemaking Responsibilities: Yes  Ambulation Assistance: Independent  Transfer Assistance: Independent  Active : No  Leisure & Hobbies: plays with dogs  Additional Comments: No falls in past 6 months       Objective   Vision: Impaired  Vision Exceptions: Wears glasses for reading  Hearing: Within functional limits    Orientation  Overall Orientation Status: Within Functional Limits  Observation/Palpation  Posture: Good  Observation: Pt is kyphotic throughout throacic spine  Balance  Sitting Balance: Modified independent   Standing Balance: Stand by assistance  Functional Mobility  Functional - Mobility Device: No device  Activity: Other  Assist Level: Stand by assistance  Functional Mobility Comments: Pt able to ambulate in hallway with SBA; pt often touched the handrails for support, however did not experience LOB  Toilet Transfers  Toilet - Technique: Ambulating  Equipment Used: Standard bedside commode  Toilet Transfer: Stand by assistance  ADL  Grooming: Stand by assistance(washed hands in stance at sink)  Toileting: Stand by assistance(clothing mgmt and toilet hygiene)  Tone RUE  RUE Tone: Normotonic  Tone LUE  LUE Tone: Normotonic  Coordination  Movements Are Fluid And Coordinated: Yes     Bed mobility  Supine to Sit: Independent  Scooting: Independent  Transfers  Sit to stand: Stand by assistance  Stand to sit: Stand by assistance     Cognition  Overall Cognitive Status: WFL  Perception  Overall Perceptual Status: WFL     Sensation  Overall Sensation Status: WFL        LUE AROM (degrees)  LUE AROM : WFL  RUE AROM (degrees)  RUE AROM : WFL  LUE Strength  Gross LUE Strength: WFL  RUE Strength  Gross RUE Strength: WFL      Plan   Plan  Times per week: Discharge to home      AM-[facility] Score     AM-[facility] Inpatient Daily Activity Raw Score: 24 (09/28/20 1350)  AM-PAC Inpatient ADL T-Scale Score : 57.54 (09/28/20 1350)  ADL Inpatient CMS 0-100% Score: 0 (09/28/20 1350)  ADL Inpatient CMS G-Code Modifier : [address] (09/28/20 [facility]0)    Goals  Patient Goals   Patient goals : No goals needed at this time       Therapy Time   Individual Concurrent Group Co-treatment   Time In 1307         Time Out 1338         Minutes 31               Timed Code Treatment Minutes: 16 minutes    Total Treatment Minutes:  31 minutes    BEAR Morgan  Therapist directly observed and directed this treatment.   Taran Toth OTR/L [id number]    Alexsander Gotti OT

## 2020-09-28 NOTE — PROGRESS NOTES
Dr. Mally Patel at bedside talking to patient's granddaughter    Electronically signed by Terrell Davey RN on 9/28/2020 at 10:05 AM

## 2020-09-28 NOTE — CARE COORDINATION
Discharge Planning Assessment    RN/SW discharge planner met with patient/ (and family member) to discuss reason for admission, current living situation, and potential needs at the time of discharge    Demographics/Insurance verified Yes    Current type of dwellin steps to enter home    Living arrangements: grand daughter/ daughter is also supportive    Level of function/Support: Independent    PCP: Marita Rodarte,    Last Visit to PCP:    DME:  walker    Active with any community resources/agencies/skilled home care: no    Medication compliance issues: no    Financial issues that could impact healthcare: no    Discussed with patient and/or family that on the day of discharge home tentative time of discharge will be between 10 AM and noon.     Transportation at the time of discharge: daughter

## 2020-09-28 NOTE — PROGRESS NOTES
This RN transferred patient to room.  Bedside handoff with Skyler Aguilar    Electronically signed by Sarah Biggs RN on 9/28/2020 at 10:40 AM

## 2020-09-28 NOTE — PROGRESS NOTES
Occupational /Physical Therapy  [facility] [place]    Patient GABRIELLA for testing this AM.  Will follow up as medically appropriate and schedule allows. Thank you,  Gianluca Alejo.  Renny Bautista, OTR/L [id number]  Syed Garcia, [address] [place] [address], DPT [id number]

## 2020-09-28 NOTE — BRIEF OP NOTE
Brief Postoperative Note      Patient: Jian Tapia  YOB: 1928  MRN: [id number]    Date of Procedure: 9/28/2020    Pre-Op Diagnosis: rectal bleeding        Procedure(s):  COLONOSCOPY POLYPECTOMY SNARE/COLD BIOPSY  COLONOSCOPY SUBMUCOSAL/BOTOX INJECTION    Surgeon(s):  Emely Beasley MD    Assistant:  * No surgical staff found *    Anesthesia: Monitor Anesthesia Care    Estimated Blood Loss (mL): Minimal    Complications: None    Specimens:   ID Type Source Tests Collected by Time Destination   A : polyp left colon Tissue Tissue SURGICAL PATHOLOGY Emely Beasley MD 9/28/2020 1690    B : polyp ascending colon Tissue Tissue SURGICAL PATHOLOGY Emely Beasley MD 9/28/2020 9823        Findings:  Poor prep. No active lower GI bleed. 18 mm sessile AC polyp (hot snare via piecemeal resection), s/p clips X 4 and tattooed. Diminutive descending colon polyp. Sigmoid diverticulosis and hemorrhoids (likely cause of recent bleed)    Plans:  Await pathology. Monitor H/H and for recurrent bleeding. If no recurrent bleeding and Hb stable, may restart Xarelto in 72 hours.      Electronically signed by Emely Beasley MD on 9/28/2020 at 9:57 AM

## 2020-09-29 LAB
HCT VFR BLD CALC: 29.7 % (ref 36–48)
HCT VFR BLD CALC: 30.2 % (ref 36–48)
HCT VFR BLD CALC: 30.9 % (ref 36–48)
HEMOGLOBIN: 10 G/DL (ref 12–16)
HEMOGLOBIN: 10.1 G/DL (ref 12–16)
HEMOGLOBIN: 10.5 G/DL (ref 12–16)
INR BLD: 1.2 (ref 0.86–1.14)
PROTHROMBIN TIME: 14 SEC (ref 10–13.2)

## 2020-09-29 PROCEDURE — 36415 COLL VENOUS BLD VENIPUNCTURE: CPT

## 2020-09-29 PROCEDURE — APPSS60 APP SPLIT SHARED TIME 46-60 MINUTES: Performed by: NURSE PRACTITIONER

## 2020-09-29 PROCEDURE — 6360000002 HC RX W HCPCS: Performed by: HOSPITALIST

## 2020-09-29 PROCEDURE — 6370000000 HC RX 637 (ALT 250 FOR IP): Performed by: HOSPITALIST

## 2020-09-29 PROCEDURE — 85014 HEMATOCRIT: CPT

## 2020-09-29 PROCEDURE — 85610 PROTHROMBIN TIME: CPT

## 2020-09-29 PROCEDURE — 6370000000 HC RX 637 (ALT 250 FOR IP): Performed by: INTERNAL MEDICINE

## 2020-09-29 PROCEDURE — 2060000000 HC ICU INTERMEDIATE R&B

## 2020-09-29 PROCEDURE — 99222 1ST HOSP IP/OBS MODERATE 55: CPT | Performed by: SURGERY

## 2020-09-29 PROCEDURE — 2580000003 HC RX 258: Performed by: HOSPITALIST

## 2020-09-29 PROCEDURE — 85018 HEMOGLOBIN: CPT

## 2020-09-29 PROCEDURE — 94760 N-INVAS EAR/PLS OXIMETRY 1: CPT

## 2020-09-29 PROCEDURE — APPNB30 APP NON BILLABLE TIME 0-30 MINS: Performed by: NURSE PRACTITIONER

## 2020-09-29 PROCEDURE — C9113 INJ PANTOPRAZOLE SODIUM, VIA: HCPCS | Performed by: HOSPITALIST

## 2020-09-29 RX ORDER — HYDROCORTISONE ACETATE 25 MG/1
25 SUPPOSITORY RECTAL 2 TIMES DAILY
Status: DISCONTINUED | OUTPATIENT
Start: 2020-09-29 | End: 2020-10-02 | Stop reason: HOSPADM

## 2020-09-29 RX ORDER — POLYETHYLENE GLYCOL 3350 17 G/17G
17 POWDER, FOR SOLUTION ORAL DAILY PRN
Status: DISCONTINUED | OUTPATIENT
Start: 2020-09-29 | End: 2020-10-02 | Stop reason: HOSPADM

## 2020-09-29 RX ADMIN — PANTOPRAZOLE SODIUM 40 MG: 40 INJECTION, POWDER, FOR SOLUTION INTRAVENOUS at 04:42

## 2020-09-29 RX ADMIN — LOVASTATIN 40 MG: 40 TABLET ORAL at 21:43

## 2020-09-29 RX ADMIN — Medication 10 ML: at 16:10

## 2020-09-29 RX ADMIN — Medication 10 ML: at 21:44

## 2020-09-29 RX ADMIN — POLYETHYLENE GLYCOL 3350 17 G: 17 POWDER, FOR SOLUTION ORAL at 16:09

## 2020-09-29 RX ADMIN — DILTIAZEM HYDROCHLORIDE 240 MG: 240 CAPSULE, EXTENDED RELEASE ORAL at 08:17

## 2020-09-29 RX ADMIN — PANTOPRAZOLE SODIUM 40 MG: 40 INJECTION, POWDER, FOR SOLUTION INTRAVENOUS at 16:09

## 2020-09-29 ASSESSMENT — PAIN SCALES - GENERAL
PAINLEVEL_OUTOF10: 0

## 2020-09-29 NOTE — PROGRESS NOTES
Pt had loose medium sized BM with moderate size of bright red blood noted. Notified attending. Electronically signed by Chelsea Murphy RN on 9/29/2020 at 9:06 AM

## 2020-09-29 NOTE — PROGRESS NOTES
09/27/20  0449  09/28/20  0445 09/28/20  1654 09/29/20  0200 09/29/20  0452   WBC 6.7  --  6.2 9.1  --   --    HGB 10.7*   < > 10.5* 10.6* 10.5* 10.1*     --  178 189  --   --     < > = values in this interval not displayed. BMP:    Recent Labs     09/27/20 0448 09/28/20  0445 09/28/20  1654    142 142   K 4.2 3.8 3.8    109 108   CO2 22 24 24   BUN 16 12 12   CREATININE 1.2 1.1 1.1   GLUCOSE 95 92 83     Hepatic:   Recent Labs     09/27/20 0448   AST 19   ALT 12   BILITOT 0.6   ALKPHOS 70     VL Extremity Venous Left   Final Result      CTA ABDOMEN PELVIS W WO CONTRAST   Final Result   A definite focus of active extravasation is not identified. There is a questionable small focus of active extravasation seen very   distally near the anorectal verge posteriorly. However, that could   potentially represent a small vessel is well. Mild diverticulosis of the large bowel, but without CT evidence of   diverticulitis. Moderate to large hiatal hernia. Recent imaging reviewed    Problem List  Principal Problem:    Rectal bleeding  Active Problems:    Chronic atrial fibrillation    Essential hypertension    Mixed hyperlipidemia    Acute blood loss anemia    RONY (acute kidney injury) ([address]  Resolved Problems:    * No resolved hospital problems. *       Assessment & Plan:   Acute blood loss anemia secondary to gi bleed s/p colonscopy with Poor prep. No active lower GI bleed. 18 mm sessile AC polyp (hot snare via piecemeal resection), s/p clips X 4 and tattooed. Diminutive descending colon polyp.  Sigmoid diverticulosis and hemorrhoids (likely cause of recent bleed)  - monitor hgb transfuse to keep >7  - resume xarelto once ok with gi and monitor for bleed overnight    Rony: improved    Chronic afib: home meds, hold xarelto for now  Diet: DIET DENTAL SOFT;  Code:Full Code  Disposition home if tolerates xarelto when ok with Brodie Carrel, MD   9/29/2020 8:52 AM

## 2020-09-29 NOTE — PROGRESS NOTES
Reassessment complete. VSS no SOB or any distress noted. Grand daughter at the bedside. Pt c/o left hip hurting. Assisted pt with repositioning. Call light within reach, pt encouraged to call if any needs arise. No further requests at this time. Will continue to monitor.

## 2020-09-29 NOTE — PLAN OF CARE
Problem: Falls - Risk of:  Goal: Will remain free from falls  Description: Will remain free from falls  9/29/2020 1622 by Chilango Zheng RN  Outcome: Ongoing  9/29/2020 0410 by Alexandrea Almendarez RN  Outcome: Ongoing  Note: Fall risk assessment completed. Pt considered a medium fall risk. Pt alert and oriented x4, will call for assistance with equipment, uses call light appropriately, VU to call if in need of assistance. Will continue to monitor. Goal: Absence of physical injury  Description: Absence of physical injury  9/29/2020 1622 by Chilango Zheng RN  Outcome: Ongoing  9/29/2020 0410 by Alexandrea Almendarez RN  Outcome: Ongoing  Intervention: Toileting assistance  9/29/2020 0410 by Alexandrea Almendarez RN  Note: Pt able to call as requested when in need of assistance. Responded to call light in a timely manner. Remained with patient while out of bed per protocol. Assisted pt back to bed and into comfortable position.

## 2020-09-29 NOTE — PLAN OF CARE
[address] [facility] Laparoscopic [facility]        Assessment & Plan of Care    History of Present Illness: Ms. Bennie Stout is a 80 y.o. female who presented the the ED 4 days ago with a chief complaint of rectal bleeding that had been ongoing for the previous week but increased the day prior to admission, prompting her to seek medical evaluation. She take Xarelto for atrial fibrillation; last dose was on 9/25/2020. She denies abdominal pain, chest pain, SOB, dizziness, lightheadedness, or any known liver dysfunction/cirrhosis. She has not required blood transfusion this admission but continues to have bleeding per rectum. She underwent a colonoscopy with polypectomy yesterday, and while poor prep, did not reveal any active bleeding and hemorrhoids which were thought to be the cause of recent bleeding. Physical Exam:  CONSTITUTIONAL:  alert, no apparent distress and normal weight  NEUROLOGIC:  Mental Status Exam:  Level of Alertness:   awake  Orientation:   person, place, time  EYES:  sclera clear  ENT:  normocepalic, without obvious abnormality  NECK:  supple, symmetrical, trachea midline  LUNGS:  clear to auscultation  CARDIOVASCULAR:  regular rate and rhythm and no murmur noted  ABDOMEN: soft, non-distended, non-tender  Extremities: no edema  SKIN:  no bruising or bleeding and normal skin color, texture, turgor    Assessment:  Hemorrhoidal bleeding  Acute blood loss anemia  Atrial fibrillation, on Xarelto  Acute kidney injury    Plan:  1. Will proceed with exam under anesthesia, possible hemorrhoidectomy with Dr. Jr Fernandez tomorrow if bleeding continues despite discontinuation of anticoagulant   2. Diet as tolerated today; NPO at midnight  3. Monitor for bleeding, hemoglobin has remained stable--labs tomorrow  4. Activity as tolerated  5. DVT prophylaxis with SCD's; holding Xarelto  6.  Management of medical comorbid etiologies per primary team and consulting services    EDUCATION:  Educated patient on plan of care and disease process--all questions answered. Plans discussed with patient and nursing. Reviewed and discussed with Dr. Matthews Blood consult to follow.       Signed:  OZZIE Louis CNP  9/29/2020 1:14 PM

## 2020-09-29 NOTE — PROGRESS NOTES
[facility] GI  Gastroenterology Progress Note    Ina Arroyo is a 80 y.o. female patient. 1. Rectal bleeding    2. Anticoagulated    3. Acute renal failure, unspecified acute renal failure type ([address]        SUBJECTIVE:  Pt had loose medium BM with bright red blood similar to what she passed at home. ROS:  Cardiovascular ROS: no chest pain or dyspnea on exertion  Gastrointestinal ROS: see above  Respiratory ROS: no cough, shortness of breath, or wheezing    Physical    VITALS:  /68   Pulse 77   Temp 98.8 °F (37.1 °C) (Oral)   Resp 16   Ht 5' 1\" (1.549 m)   Wt 128 lb 8 oz (58.3 kg)   SpO2 96%   BMI 24.28 kg/m²   TEMPERATURE:  Current - Temp: 98.8 °F (37.1 °C); Max - Temp  Av.9 °F (36.6 °C)  Min: 97.1 °F (36.2 °C)  Max: 98.8 °F (37.1 °C)    NAD  RRR, Nl s1s2  Lungs CTA Bilaterally, normal effort  Abdomen soft, ND, NT, no HSM, Bowel sounds normal  AAOx3, No asterixis     Data      CBC:   Recent Labs     20  1654 20  0200 20  0452   WBC 6.7  --  6.2 9.1  --   --    RBC 3.15*  --  3.10* 3.27*  --   --    HGB 10.7*   < > 10.5* 10.6* 10.5* 10.1*   HCT 30.8*   < > 30.8* 32.1* 30.9* 29.7*     --  178 189  --   --    MCV 97.9  --  99.1 98.2  --   --    MCH 34.1*  --  33.8 32.6  --   --    MCHC 34.8  --  34.1 33.2  --   --    RDW 14.1  --  13.7 13.8  --   --     < > = values in this interval not displayed. BMP:  Recent Labs     205 20  1654    142 142   K 4.2 3.8 3.8    109 108   CO2 22 24 24   BUN 16 12 12   CREATININE 1.2 1.1 1.1   CALCIUM 8.8 8.6 8.7   GLUCOSE 95 92 83        Hepatic Function Panel:   Recent Labs     20  0448   AST 19   ALT 12   BILITOT 0.6   ALKPHOS 70       No results for input(s): LIPASE, AMYLASE in the last 72 hours.   Recent Labs     20  0449 20  0445 20  0453   PROTIME 17.7* 16.9* 14.0*   INR 1.52* 1.45* 1.20*     No results for input(s): PTT in the last 72 hours. No results for input(s): OCCULTBLD in the last 72 hours. Radiology Review:    VL Extremity Venous Left   Final Result      CTA ABDOMEN PELVIS W WO CONTRAST   Final Result   A definite focus of active extravasation is not identified. There is a questionable small focus of active extravasation seen very   distally near the anorectal verge posteriorly. However, that could   potentially represent a small vessel is well. Mild diverticulosis of the large bowel, but without CT evidence of   diverticulitis. Moderate to large hiatal hernia.                Assessment:   The patient is a 80 y.o. old female  with following problems:     Active Problems:    Chronic atrial fibrillation    Essential hypertension    Mixed hyperlipidemia  Resolved Problems:    * No resolved hospital problems. *     Acute blood loss anemia with hematochezia. Colonoscopy 9/28 with 18 mm sessile ascending colon polyp (removed with hot snare, clips x4) small descending colon polyp, sigmoid diverticulosis and hemorrhoids. Bleeding felt to be hemorrhoidal. Passed BRBPR this am. hgb stable at 10. AF, Xarelto held  Hiatal hernia and colonic diverticulosis on CT      Recommendations:   - anusol NM BID  - monitor hgb  - hold xarelto for now given recurrent bleeding  - surgery consult for hemorrhoidal bleeding    Discussed with Dr. Tess Obrien, PA-C  [address]    I have personally performed a face to face diagnostic evaluation on this patient. I have interviewed and examined the patient and I agree with the findings and recommended plan of care. In summary, my findings and plan are the following:     Patient has rectal bleeding with BM. No abdominal pain. VSS abdomen soft and nontender. Hb remains stable around 10. Colonoscopy yesterday was poor prep, 18 mm ascending colon polyp removed (path was TA). I suspect he has hemorrhoidal bleeding. Will give anusol suppository.  Consult surgery for hemorrhoid management. No surveillance colonoscopy in the future given advanced age. GI will sign off. Please call if you have questions.        Valeria Wilkins MD, MSc  [address] [facility]

## 2020-09-30 LAB
BASOPHILS ABSOLUTE: 0 K/UL (ref 0–0.2)
BASOPHILS RELATIVE PERCENT: 0.5 %
EOSINOPHILS ABSOLUTE: 0.2 K/UL (ref 0–0.6)
EOSINOPHILS RELATIVE PERCENT: 3 %
HCT VFR BLD CALC: 31.3 % (ref 36–48)
HEMOGLOBIN: 10.7 G/DL (ref 12–16)
LYMPHOCYTES ABSOLUTE: 1.8 K/UL (ref 1–5.1)
LYMPHOCYTES RELATIVE PERCENT: 24 %
MCH RBC QN AUTO: 33.8 PG (ref 26–34)
MCHC RBC AUTO-ENTMCNC: 34.1 G/DL (ref 31–36)
MCV RBC AUTO: 99.1 FL (ref 80–100)
MONOCYTES ABSOLUTE: 1.1 K/UL (ref 0–1.3)
MONOCYTES RELATIVE PERCENT: 15.6 %
NEUTROPHILS ABSOLUTE: 4.2 K/UL (ref 1.7–7.7)
NEUTROPHILS RELATIVE PERCENT: 56.9 %
PDW BLD-RTO: 14 % (ref 12.4–15.4)
PLATELET # BLD: 177 K/UL (ref 135–450)
PMV BLD AUTO: 8.3 FL (ref 5–10.5)
RBC # BLD: 3.16 M/UL (ref 4–5.2)
WBC # BLD: 7.4 K/UL (ref 4–11)

## 2020-09-30 PROCEDURE — C9113 INJ PANTOPRAZOLE SODIUM, VIA: HCPCS | Performed by: HOSPITALIST

## 2020-09-30 PROCEDURE — 6370000000 HC RX 637 (ALT 250 FOR IP): Performed by: INTERNAL MEDICINE

## 2020-09-30 PROCEDURE — 6360000002 HC RX W HCPCS: Performed by: HOSPITALIST

## 2020-09-30 PROCEDURE — 85025 COMPLETE CBC W/AUTO DIFF WBC: CPT

## 2020-09-30 PROCEDURE — 94760 N-INVAS EAR/PLS OXIMETRY 1: CPT

## 2020-09-30 PROCEDURE — 36415 COLL VENOUS BLD VENIPUNCTURE: CPT

## 2020-09-30 PROCEDURE — APPNB30 APP NON BILLABLE TIME 0-30 MINS: Performed by: NURSE PRACTITIONER

## 2020-09-30 PROCEDURE — 2060000000 HC ICU INTERMEDIATE R&B

## 2020-09-30 PROCEDURE — 2580000003 HC RX 258: Performed by: HOSPITALIST

## 2020-09-30 PROCEDURE — APPSS15 APP SPLIT SHARED TIME 0-15 MINUTES: Performed by: NURSE PRACTITIONER

## 2020-09-30 PROCEDURE — 99232 SBSQ HOSP IP/OBS MODERATE 35: CPT | Performed by: SURGERY

## 2020-09-30 PROCEDURE — 6370000000 HC RX 637 (ALT 250 FOR IP): Performed by: PHYSICIAN ASSISTANT

## 2020-09-30 PROCEDURE — 6370000000 HC RX 637 (ALT 250 FOR IP): Performed by: HOSPITALIST

## 2020-09-30 RX ADMIN — DILTIAZEM HYDROCHLORIDE 240 MG: 240 CAPSULE, EXTENDED RELEASE ORAL at 12:47

## 2020-09-30 RX ADMIN — Medication 10 ML: at 17:51

## 2020-09-30 RX ADMIN — HYDROCORTISONE ACETATE 25 MG: 25 SUPPOSITORY RECTAL at 11:03

## 2020-09-30 RX ADMIN — Medication 10 ML: at 21:27

## 2020-09-30 RX ADMIN — PANTOPRAZOLE SODIUM 40 MG: 40 INJECTION, POWDER, FOR SOLUTION INTRAVENOUS at 09:05

## 2020-09-30 RX ADMIN — PANTOPRAZOLE SODIUM 40 MG: 40 INJECTION, POWDER, FOR SOLUTION INTRAVENOUS at 17:49

## 2020-09-30 RX ADMIN — HYDROCORTISONE ACETATE 25 MG: 25 SUPPOSITORY RECTAL at 21:27

## 2020-09-30 RX ADMIN — Medication 10 ML: at 09:05

## 2020-09-30 RX ADMIN — LOVASTATIN 40 MG: 40 TABLET ORAL at 21:26

## 2020-09-30 RX ADMIN — POLYETHYLENE GLYCOL 3350 17 G: 17 POWDER, FOR SOLUTION ORAL at 12:52

## 2020-09-30 ASSESSMENT — PAIN SCALES - GENERAL
PAINLEVEL_OUTOF10: 0

## 2020-09-30 ASSESSMENT — ENCOUNTER SYMPTOMS
CHEST TIGHTNESS: 0
RECTAL PAIN: 0
EYE DISCHARGE: 0
APNEA: 0
COLOR CHANGE: 0
ABDOMINAL DISTENTION: 0
ABDOMINAL PAIN: 0
EYE ITCHING: 0

## 2020-09-30 NOTE — CONSULTS
Willi Ford     Chief complaint-rectal bleeding    HPI: 79-year-old female who presented to the emergency room 4 days ago with complaints of rectal bleeding of several days duration. She takes Xarelto for atrial fibrillation and has been off of it for 4 days. Colonoscopy yesterday indicated hemorrhoids as the likely site of bleeding. She has been hemodynamically stable and has not required a blood transfusion. We were asked see the patient regarding surgical consultation. No history of nausea, vomiting, abdominal pain, fevers, chills or urinary symptoms.     Past Medical History:   Diagnosis Date    Atrial fibrillation ([facility] [facility]     History of skin cancer     Hyperlipidemia     Hypertension        Past Surgical History:   Procedure Laterality Date    COLONOSCOPY N/A 9/28/2020    COLONOSCOPY POLYPECTOMY SNARE/COLD BIOPSY performed by Lucinda Rodriguez MD at [facility]  9/28/2020    COLONOSCOPY SUBMUCOSAL/BOTOX INJECTION performed by Lucinda Rodriguez MD at [address] [place] [address],  Drive History     Socioeconomic History    Marital status:      Spouse name: Not on file    Number of children: Not on file    Years of education: Not on file    Highest education level: Not on file   Occupational History    Not on file   Social Needs    Financial resource strain: Not on file    Food insecurity     Worry: Not on file     Inability: Not on file    Transportation needs     Medical: Not on file     Non-medical: Not on file   Tobacco Use    Smoking status: Never Smoker    Smokeless tobacco: Never Used   Substance and Sexual Activity    Alcohol use: No    Drug use: Never    Sexual activity: Not on file   Lifestyle    Physical activity     Days per week: Not on file     Minutes per session: Not on file    Stress: Not on file   Relationships    Social connections     Talks on phone: Not on file     Gets together: Not on file     Attends Evangelical service: Not on file     Active member of club or organization: Not on file     Attends meetings of clubs or organizations: Not on file     Relationship status: Not on file    Intimate partner violence     Fear of current or ex partner: Not on file     Emotionally abused: Not on file     Physically abused: Not on file     Forced sexual activity: Not on file   Other Topics Concern    Not on file   Social History Narrative    Not on file       Allergies: No Known Allergies    Prior to Admission medications    Medication Sig Start Date End Date Taking? Authorizing Provider   polyethylene glycol (GLYCOLAX) 17 g packet Take 17 g by mouth daily as needed for Constipation   Yes Historical Provider, MD   Blood Pressure KIT 1 each by Does not apply route once a week 9/15/20  Yes Marita Rodarte MD   rivaroxaban (XARELTO) 15 MG TABS tablet Take 1 tablet by mouth daily 6/15/20  Yes Marita Rodarte MD   furosemide (LASIX) 40 MG tablet Take 40 mg by mouth as needed   Yes Historical Provider, MD   diltiazem (CARDIZEM CD) 240 MG ER capsule Take 1 capsule by mouth daily 4/17/16  Yes Dmitry Landeros MD   famotidine (PEPCID) 20 MG tablet Take 1 tablet by mouth 2 times daily 4/17/16  Yes Dmitry Landeros MD   lovastatin (MEVACOR) 40 MG tablet Take 40 mg by mouth nightly. Yes Historical Provider, MD       Principal Problem:    Rectal bleeding  Active Problems:    Chronic atrial fibrillation    Essential hypertension    Mixed hyperlipidemia    Acute blood loss anemia    CHACHA (acute kidney injury) ([facility] [address]  Resolved Problems:    * No resolved hospital problems. *      Blood pressure 128/76, pulse 66, temperature 98.6 °F (37 °C), temperature source Oral, resp. rate 16, height 5' 1\" (1.549 m), weight 130 lb 3.2 oz (59.1 kg), SpO2 94 %. Review of Systems   Constitutional: Positive for activity change. Negative for appetite change. HENT: Negative for congestion and dental problem. Eyes: Negative for discharge and itching.    Respiratory: Negative for apnea and chest tightness. Cardiovascular: Negative for chest pain and leg swelling. Gastrointestinal: Negative for abdominal distention, abdominal pain and rectal pain. Endocrine: Negative for cold intolerance and heat intolerance. Genitourinary: Negative for difficulty urinating and dyspareunia. Musculoskeletal: Negative for arthralgias. Skin: Negative for color change and pallor. Allergic/Immunologic: Negative for environmental allergies and food allergies. Neurological: Negative for dizziness and facial asymmetry. Hematological: Negative for adenopathy. Does not bruise/bleed easily. Psychiatric/Behavioral: Negative for agitation and behavioral problems. Physical Exam  Constitutional:       Appearance: She is well-developed. HENT:      Head: Normocephalic and atraumatic. Right Ear: External ear normal.      Left Ear: External ear normal.   Eyes:      Conjunctiva/sclera: Conjunctivae normal.   Neck:      Musculoskeletal: Normal range of motion and neck supple. Cardiovascular:      Rate and Rhythm: Normal rate and regular rhythm. Pulmonary:      Effort: Pulmonary effort is normal.      Breath sounds: Normal breath sounds. Abdominal:      General: There is no distension. Palpations: Abdomen is soft. Tenderness: There is no abdominal tenderness. Musculoskeletal: Normal range of motion. Skin:     General: Skin is warm and dry. Neurological:      Mental Status: She is alert and oriented to person, place, and time. Psychiatric:         Behavior: Behavior normal.     Hemoglobin-10.0 g/dL    Assessment:  Pleasant 70-year-old female who takes Xarelto for atrial fibrillation and was admitted to the hospital 4 days ago with rectal bleeding. She has been hemodynamically stable since admission and has not required a transfusion. She has now been off of her Xarelto for 4 days. Colonoscopy yesterday identified hemorrhoids as the site of bleeding.   She has already eaten today.    Plan:  Continue to hold Xarelto  N.p.o. after midnight  We will plan for examination under anesthesia with either hemorrhoidectomy or suture of bleeding hemorrhoids tomorrow    Ermelinda Haas MD  9/30/2020

## 2020-09-30 NOTE — PROGRESS NOTES
3.8    108   CO2 24 24   BUN 12 12   CREATININE 1.1 1.1   GLUCOSE 92 83     Hepatic:  No results for input(s): AST, ALT, ALB, BILITOT, ALKPHOS in the last 72 hours. Amylase:  No results found for: AMYLASE  Lipase:  No results found for: LIPASE   Mag:  No results found for: MG  Phos:   No results found for: PHOS   Coags:   Lab Results   Component Value Date    PROTIME 14.0 09/29/2020    INR 1.20 09/29/2020    APTT 38.8 09/25/2020       Cultures:  Anaerobic culture  No results found for: LABANAE  Fungus stain  No results found for requested labs within last 30 days. Gram stain  No results found for requested labs within last 30 days. Organism  Lab Results   Component Value Date/Time    ORG Klebsiella pneumoniae ssp pneumoniae (A) 04/15/2016 05:00 PM     Surgical culture  No results found for: CXSURG  Blood culture 1  No results found for requested labs within last 30 days. Blood culture 2  No results found for requested labs within last 30 days. Fecal occult  No results found for requested labs within last 30 days. GI bacterial pathogens by PCR  No results found for requested labs within last 30 days. C. difficile  No results found for requested labs within last 30 days. Urine culture  Lab Results   Component Value Date    LABURIN >100,000 CFU/ml 04/15/2016       Pathology:  \"Relevant pathology documented under results section     Imaging:  I have personally reviewed the following films:    No results found. Scheduled Meds:   hydrocortisone  25 mg Rectal BID    lovastatin  40 mg Oral Nightly    dilTIAZem  240 mg Oral Daily    sodium chloride flush  10 mL Intravenous 2 times per day    pantoprazole  40 mg Intravenous Q12H    And    sodium chloride (PF)  10 mL Intravenous Q12H     Continuous Infusions:  PRN Meds:.polyethylene glycol, sodium chloride flush, acetaminophen **OR** acetaminophen, promethazine **OR** ondansetron      Assessment:  80 y.o. female admitted with   1. Rectal bleeding    2. Anticoagulated    3. Acute renal failure, unspecified acute renal failure type ([address]        Hemorrhoidal bleeding  Acute blood loss anemia  Atrial fibrillation, on Xarelto  Acute kidney injury      Plan:  1. No further bleeding overnight, will hold off on EUA  2. General diet as tolerated, NPO at midnight again in case patient should rebleed  3. Monitor for bleeding, hemoglobin remains stable--monitor  4. Activity as tolerated  5. DVT prophylaxis with SCD's; holding Xarelto--if no further bleeding, will likely resume tomorrow  6. Management of medical comorbid etiologies per primary team and consulting services  7. Disposition: Anticipate discharge home in the next 2-3 days if no further bleeding    EDUCATION:  Educated patient on plan of care and disease process--all questions answered. Plans discussed with patient and nursing. Reviewed and discussed with Dr. Maggy Angelo.       Signed:  OZZIE Omer CNP  9/30/2020 2:20 PM     No evidence of rectal bleeding  Diet order written for patient  Continue to hold Xarelto  Will reevaluate the patient in the morning  If none no further bleeding overnight, will restart Xarelto

## 2020-09-30 NOTE — PROGRESS NOTES
Assessment complete. Vitals:    09/29/20 2131   BP: 125/74   Pulse: 82   Resp: 16   Temp: 98.2 °F (36.8 °C)   SpO2: 96%   No SOB or any distress noted. Pt up to the bathroom with grand daughter. Pt has no pain and reports no bleeding. Anusol held at this time. IV flushed but painful. Will assess to see if another needs to be started. Call light within reach, pt encouraged to call if any needs arise. No further requests at this time. Will continue to monitor.

## 2020-09-30 NOTE — PROGRESS NOTES
[facility]    9/30/2020 9:37 AM        Name: Jl Us . Admitted: 9/25/2020  Primary Care Provider: Janett Nash MD (Tel: [phone])        Subjective:    Patient had brbpr yesterday x1 no further episodes today    Reviewed interval ancillary notes    Current Medications  polyethylene glycol (GLYCOLAX) packet 17 g, Daily PRN  hydrocortisone (ANUSOL-HC) suppository 25 mg, BID  lovastatin (MEVACOR) tablet 40 mg- PATIENT SUPPLIED, Nightly  dilTIAZem (CARDIZEM CD) extended release capsule 240 mg, Daily  sodium chloride flush 0.9 % injection 10 mL, 2 times per day  sodium chloride flush 0.9 % injection 10 mL, PRN  acetaminophen (TYLENOL) tablet 650 mg, Q6H PRN    Or  acetaminophen (TYLENOL) suppository 650 mg, Q6H PRN  promethazine (PHENERGAN) tablet 12.5 mg, Q6H PRN    Or  ondansetron (ZOFRAN) injection 4 mg, Q6H PRN  pantoprazole (PROTONIX) injection 40 mg, Q12H    And  sodium chloride (PF) 0.9 % injection 10 mL, Q12H        Objective:  /76   Pulse 66   Temp 98.6 °F (37 °C) (Oral)   Resp 16   Ht 5' 1\" (1.549 m)   Wt 130 lb 3.2 oz (59.1 kg)   SpO2 94%   BMI 24.60 kg/m²   No intake or output data in the 24 hours ending 09/30/20 0937   Wt Readings from Last 3 Encounters:   09/30/20 130 lb 3.2 oz (59.1 kg)   09/15/20 129 lb (58.5 kg)   04/02/20 135 lb (61.2 kg)       General appearance:  Appears comfortable. AAOx3  HEENT: atraumatic, Pupils equal, muscous membranes moist, no masses appreciated  Cardiovascular: Regular rate and rhythm no murmurs appreciated  Respiratory: CTAB no wheezing  Gastrointestinal: Abdomen soft, non-tender, BS+  EXT: no edema  Neurology: no gross focal deficts  Psychiatry: Appropriate affect.  Not agitated  Skin: Warm, dry, no rashes appreciated    Labs and Tests:  CBC:   Recent Labs     09/28/20  0445 09/28/20  1654  09/29/20  0452 09/29/20  1038 09/30/20  0443 WBC 6.2 9.1  --   --   --  7.4   HGB 10.5* 10.6*   < > 10.1* 10.0* 10.7*    189  --   --   --  177    < > = values in this interval not displayed. BMP:    Recent Labs     09/28/20  0445 09/28/20  1654    142   K 3.8 3.8    108   CO2 24 24   BUN 12 12   CREATININE 1.1 1.1   GLUCOSE 92 83     Hepatic:   No results for input(s): AST, ALT, ALB, BILITOT, ALKPHOS in the last 72 hours. VL Extremity Venous Left   Final Result      CTA ABDOMEN PELVIS W WO CONTRAST   Final Result   A definite focus of active extravasation is not identified. There is a questionable small focus of active extravasation seen very   distally near the anorectal verge posteriorly. However, that could   potentially represent a small vessel is well. Mild diverticulosis of the large bowel, but without CT evidence of   diverticulitis. Moderate to large hiatal hernia. Recent imaging reviewed    Problem List  Principal Problem:    Rectal bleeding  Active Problems:    Chronic atrial fibrillation    Essential hypertension    Mixed hyperlipidemia    Acute blood loss anemia    RONY (acute kidney injury) ([facility] [address]  Resolved Problems:    * No resolved hospital problems. *       Assessment & Plan:   Acute blood loss anemia secondary to gi bleed s/p colonscopy with Poor prep. No active lower GI bleed. 18 mm sessile AC polyp (hot snare via piecemeal resection), s/p clips X 4 and tattooed. Diminutive descending colon polyp.  Sigmoid diverticulosis and hemorrhoids (likely cause of recent bleed)  - bleed yesterday surgey consulted possible hemorrhoidectomy   - monitor hgb   Hold xarelot for now    Rony: improved    Chronic afib: home meds, hold xarelto for now  Diet: Diet NPO, After Midnight Exceptions are: Sips with Meds  Code:Full Code  Disposition home 1-2 days      Elsa Dykes MD   9/30/2020 9:37 AM

## 2020-10-01 LAB
BASOPHILS ABSOLUTE: 0 K/UL (ref 0–0.2)
BASOPHILS RELATIVE PERCENT: 0.4 %
EOSINOPHILS ABSOLUTE: 0.2 K/UL (ref 0–0.6)
EOSINOPHILS RELATIVE PERCENT: 3.4 %
HCT VFR BLD CALC: 28.7 % (ref 36–48)
HEMOGLOBIN: 9.8 G/DL (ref 12–16)
LYMPHOCYTES ABSOLUTE: 1.5 K/UL (ref 1–5.1)
LYMPHOCYTES RELATIVE PERCENT: 21.4 %
MCH RBC QN AUTO: 33.4 PG (ref 26–34)
MCHC RBC AUTO-ENTMCNC: 34.1 G/DL (ref 31–36)
MCV RBC AUTO: 97.9 FL (ref 80–100)
MONOCYTES ABSOLUTE: 1 K/UL (ref 0–1.3)
MONOCYTES RELATIVE PERCENT: 14.2 %
NEUTROPHILS ABSOLUTE: 4.2 K/UL (ref 1.7–7.7)
NEUTROPHILS RELATIVE PERCENT: 60.6 %
PDW BLD-RTO: 13.9 % (ref 12.4–15.4)
PLATELET # BLD: 171 K/UL (ref 135–450)
PMV BLD AUTO: 8.8 FL (ref 5–10.5)
RBC # BLD: 2.93 M/UL (ref 4–5.2)
WBC # BLD: 7 K/UL (ref 4–11)

## 2020-10-01 PROCEDURE — 2060000000 HC ICU INTERMEDIATE R&B

## 2020-10-01 PROCEDURE — 6370000000 HC RX 637 (ALT 250 FOR IP): Performed by: INTERNAL MEDICINE

## 2020-10-01 PROCEDURE — 6370000000 HC RX 637 (ALT 250 FOR IP): Performed by: HOSPITALIST

## 2020-10-01 PROCEDURE — 6370000000 HC RX 637 (ALT 250 FOR IP): Performed by: PHYSICIAN ASSISTANT

## 2020-10-01 PROCEDURE — 94760 N-INVAS EAR/PLS OXIMETRY 1: CPT

## 2020-10-01 PROCEDURE — 6360000002 HC RX W HCPCS: Performed by: HOSPITALIST

## 2020-10-01 PROCEDURE — 99232 SBSQ HOSP IP/OBS MODERATE 35: CPT | Performed by: SURGERY

## 2020-10-01 PROCEDURE — 85025 COMPLETE CBC W/AUTO DIFF WBC: CPT

## 2020-10-01 PROCEDURE — C9113 INJ PANTOPRAZOLE SODIUM, VIA: HCPCS | Performed by: HOSPITALIST

## 2020-10-01 PROCEDURE — 36415 COLL VENOUS BLD VENIPUNCTURE: CPT

## 2020-10-01 PROCEDURE — 2580000003 HC RX 258: Performed by: HOSPITALIST

## 2020-10-01 RX ADMIN — DILTIAZEM HYDROCHLORIDE 240 MG: 240 CAPSULE, EXTENDED RELEASE ORAL at 10:40

## 2020-10-01 RX ADMIN — LOVASTATIN 40 MG: 40 TABLET ORAL at 20:28

## 2020-10-01 RX ADMIN — Medication 10 ML: at 20:29

## 2020-10-01 RX ADMIN — Medication 10 ML: at 04:34

## 2020-10-01 RX ADMIN — HYDROCORTISONE ACETATE 25 MG: 25 SUPPOSITORY RECTAL at 10:40

## 2020-10-01 RX ADMIN — RIVAROXABAN 15 MG: 15 TABLET, FILM COATED ORAL at 15:12

## 2020-10-01 RX ADMIN — PANTOPRAZOLE SODIUM 40 MG: 40 INJECTION, POWDER, FOR SOLUTION INTRAVENOUS at 17:51

## 2020-10-01 RX ADMIN — HYDROCORTISONE ACETATE 25 MG: 25 SUPPOSITORY RECTAL at 20:29

## 2020-10-01 RX ADMIN — POLYETHYLENE GLYCOL 3350 17 G: 17 POWDER, FOR SOLUTION ORAL at 12:00

## 2020-10-01 RX ADMIN — Medication 10 ML: at 17:52

## 2020-10-01 RX ADMIN — PANTOPRAZOLE SODIUM 40 MG: 40 INJECTION, POWDER, FOR SOLUTION INTRAVENOUS at 04:34

## 2020-10-01 RX ADMIN — Medication 10 ML: at 10:41

## 2020-10-01 ASSESSMENT — PAIN SCALES - GENERAL
PAINLEVEL_OUTOF10: 0

## 2020-10-01 NOTE — PROGRESS NOTES
Patient rested well overnight. VSS. No s/s bleeding to note. Made NPO at midnight.   Denies any current needs this AM.

## 2020-10-01 NOTE — PROGRESS NOTES
[facility]    10/1/2020 9:28 AM        Name: Zac Ramirez . Admitted: 9/25/2020  Primary Care Provider: Abdirizak Fermin MD (Tel: [phone])        Subjective:    Patient feels much better no bleeding overnight no chest pain no nausea no vomiting    Reviewed interval ancillary notes    Current Medications  influenza quadrivalent split vaccine (FLUZONE;FLUARIX;FLULAVAL;AFLURIA) injection 0.5 mL, Once  polyethylene glycol (GLYCOLAX) packet 17 g, Daily PRN  hydrocortisone (ANUSOL-HC) suppository 25 mg, BID  lovastatin (MEVACOR) tablet 40 mg- PATIENT SUPPLIED, Nightly  dilTIAZem (CARDIZEM CD) extended release capsule 240 mg, Daily  sodium chloride flush 0.9 % injection 10 mL, 2 times per day  sodium chloride flush 0.9 % injection 10 mL, PRN  acetaminophen (TYLENOL) tablet 650 mg, Q6H PRN    Or  acetaminophen (TYLENOL) suppository 650 mg, Q6H PRN  promethazine (PHENERGAN) tablet 12.5 mg, Q6H PRN    Or  ondansetron (ZOFRAN) injection 4 mg, Q6H PRN  pantoprazole (PROTONIX) injection 40 mg, Q12H    And  sodium chloride (PF) 0.9 % injection 10 mL, Q12H        Objective:  /82   Pulse 74   Temp 97 °F (36.1 °C) (Temporal)   Resp 16   Ht 5' 1\" (1.549 m)   Wt 127 lb 12.8 oz (58 kg)   SpO2 97%   BMI 24.15 kg/m²     Intake/Output Summary (Last 24 hours) at 10/1/2020 0928  Last data filed at 9/30/2020 2119  Gross per 24 hour   Intake 240 ml   Output --   Net 240 ml      Wt Readings from Last 3 Encounters:   10/01/20 127 lb 12.8 oz (58 kg)   09/15/20 129 lb (58.5 kg)   04/02/20 135 lb (61.2 kg)       General appearance:  Appears comfortable.  AAOx3  HEENT: atraumatic, Pupils equal, muscous membranes moist, no masses appreciated  Cardiovascular: Regular rate and rhythm no murmurs appreciated  Respiratory: CTAB no wheezing  Gastrointestinal: Abdomen soft, non-tender, BS+  EXT: no edema  Neurology: no gross focal deficts  Psychiatry: Appropriate affect. Not agitated  Skin: Warm, dry, no rashes appreciated    Labs and Tests:  CBC:   Recent Labs     09/28/20  1654  09/29/20  1038 09/30/20  0443 10/01/20  0436   WBC 9.1  --   --  7.4 7.0   HGB 10.6*   < > 10.0* 10.7* 9.8*     --   --  177 171    < > = values in this interval not displayed. BMP:    Recent Labs     09/28/20  1654      K 3.8      CO2 24   BUN 12   CREATININE 1.1   GLUCOSE 83     Hepatic:   No results for input(s): AST, ALT, ALB, BILITOT, ALKPHOS in the last 72 hours. VL Extremity Venous Left   Final Result      CTA ABDOMEN PELVIS W WO CONTRAST   Final Result   A definite focus of active extravasation is not identified. There is a questionable small focus of active extravasation seen very   distally near the anorectal verge posteriorly. However, that could   potentially represent a small vessel is well. Mild diverticulosis of the large bowel, but without CT evidence of   diverticulitis. Moderate to large hiatal hernia. Recent imaging reviewed    Problem List  Principal Problem:    Rectal bleeding  Active Problems:    Chronic atrial fibrillation    Essential hypertension    Mixed hyperlipidemia    Acute blood loss anemia    RONY (acute kidney injury) ([address]  Resolved Problems:    * No resolved hospital problems. *       Assessment & Plan:   Acute blood loss anemia secondary to gi bleed s/p colonscopy with Poor prep. No active lower GI bleed. 18 mm sessile AC polyp (hot snare via piecemeal resection), s/p clips X 4 and tattooed. Diminutive descending colon polyp.  Sigmoid diverticulosis and hemorrhoids (likely cause of recent bleed)  -no further bleeding if ok with surgery will restart xarelto and monitor today    Rony: improved    Chronic afib: home meds, hold xarelto for now  Diet: Diet NPO, After Midnight Exceptions are: Sips with Meds  Code:Full Code  Disposition home in am if stable      Neela Omer MD 10/1/2020 9:28 AM

## 2020-10-01 NOTE — PROGRESS NOTES
Pt resting in bed with no symptoms/signs of pain or distress at this time. Pt has no concerns or complaints. Tele in place with VSS. No change from previous assessment. Will continue to monitor.

## 2020-10-01 NOTE — PLAN OF CARE
Problem: Falls - Risk of:  Goal: Will remain free from falls  Description: Will remain free from falls  10/1/2020 0213 by Whitney Gray RN  Outcome: Ongoing  Patient remains free from falls thus far this shift. Alert and oriented and uses call light appropriately for needs. Ambulates with steady gait and no assistive devices. Pt's grand daughter at bedside assisting in care. Will cont to monitor. Problem: Bleeding:  Goal: Will show no signs and symptoms of excessive bleeding  Description: Will show no signs and symptoms of excessive bleeding  Outcome: Ongoing  No s/s bleeding to note. Pt made NPO for possible hemmorhoidectomy in AM. If surgery is not warranted, plans to restart xarelto. Pt and family aware of s/s bleeding. Will cont to monitor.

## 2020-10-01 NOTE — PROGRESS NOTES
Jian Tapia is a 80 y.o. female patient.   Chief complaint-rectal bleeding    HPI- 44-year-old female seen in follow-up for rectal bleeding  Current Facility-Administered Medications   Medication Dose Route Frequency Provider Last Rate Last Dose    influenza quadrivalent split vaccine (FLUZONE;FLUARIX;FLULAVAL;AFLURIA) injection 0.5 mL  0.5 mL Intramuscular Once Jamila Jolly MD        polyethylene glycol (GLYCOLAX) packet 17 g  17 g Oral Daily PRN Jamila Jolly MD   17 g at 09/30/20 1252    hydrocortisone (ANUSOL-HC) suppository 25 mg  25 mg Rectal BID Surinder Branden PA-C   25 mg at 10/01/20 1040    lovastatin (MEVACOR) tablet 40 mg- PATIENT SUPPLIED  40 mg Oral Nightly Jamila Jolly MD   40 mg at 09/30/20 2126    dilTIAZem (CARDIZEM CD) extended release capsule 240 mg  240 mg Oral Daily Magdaleno Chakraborty MD   240 mg at 10/01/20 1040    sodium chloride flush 0.9 % injection 10 mL  10 mL Intravenous 2 times per day Andres Junior MD   10 mL at 10/01/20 1041    sodium chloride flush 0.9 % injection 10 mL  10 mL Intravenous PRN Andres Junior MD   10 mL at 09/28/20 0441    acetaminophen (TYLENOL) tablet 650 mg  650 mg Oral Q6H PRN Andres Junior MD        Or    acetaminophen (TYLENOL) suppository 650 mg  650 mg Rectal Q6H PRN Andres Junior MD        promethazine (PHENERGAN) tablet 12.5 mg  12.5 mg Oral Q6H PRN Magdaleno Chakraborty MD        Or    ondansetron (ZOFRAN) injection 4 mg  4 mg Intravenous Q6H PRN Magdaleno Chakraborty MD        pantoprazole (PROTONIX) injection 40 mg  40 mg Intravenous Q12H Magdaleno Chakraborty MD   40 mg at 10/01/20 0434    And    sodium chloride (PF) 0.9 % injection 10 mL  10 mL Intravenous Q12H Magdaleno Chakraborty MD   10 mL at 10/01/20 0434     No Known Allergies  Principal Problem:    Rectal bleeding  Active Problems:    Chronic atrial fibrillation    Essential hypertension    Mixed hyperlipidemia    Acute blood loss anemia    CHACHA (acute kidney injury) [facility])  Resolved Problems:    * No resolved hospital problems. *    Blood pressure 129/82, pulse 74, temperature 97 °F (36.1 °C), temperature source Temporal, resp. rate 16, height 5' 1\" (1.549 m), weight 127 lb 12.8 oz (58 kg), SpO2 97 %. Subjective:  Symptoms:  Improved. Diet:  NPO. Activity level: Normal.    Pain:  She reports no pain. Objective:  General Appearance:  Comfortable. Vital signs: (most recent): Blood pressure 129/82, pulse 74, temperature 97 °F (36.1 °C), temperature source Temporal, resp. rate 16, height 5' 1\" (1.549 m), weight 127 lb 12.8 oz (58 kg), SpO2 97 %. Output: Producing urine. Lungs:  Normal effort and normal respiratory rate. Abdomen: Abdomen is soft and non-distended. Neurological: Patient is alert and oriented to person, place and time. Skin:  Warm and dry. Assessment & Plan 27-year-old female seen in follow-up for rectal bleeding secondary to hemorrhoids. No bleeding over the last 24 hours. Continue Anusol suppositories. Okay to restart Xarelto. If she rebleeds with Xarelto, will plan for examination under anesthesia with hemorrhoidectomy. If she does well over the next 24 hours after starting Xarelto, she can likely be discharged home. Will follow with you.     Esther Anderson MD  10/1/2020

## 2020-10-01 NOTE — PROGRESS NOTES
Handoff report received and patient assessment completed. Alert and oriented x4 with VSS. Sao2 maintained on RA with clear lung sounds bilaterally. Patient ambulating in hallway with grand daughter with steady gait noted. Denies any pain or discomfort. Scheduled suppository given, few small external hemorrhoids noted but no bleeding. LLE with +2 edema, RLE +1. Denies any pain or discomfort. Pt is aware of NPO status at midnight. POC discussed with patient and patient's grand daughter Marlena Huddleston at bedside. All questions addressed. Will cont to monitor.

## 2020-10-02 ENCOUNTER — TELEPHONE (OUTPATIENT)
Dept: FAMILY MEDICINE CLINIC | Age: 85
End: 2020-10-02

## 2020-10-02 ENCOUNTER — NURSE TRIAGE (OUTPATIENT)
Dept: OTHER | Facility: CLINIC | Age: 85
End: 2020-10-02

## 2020-10-02 VITALS
WEIGHT: 127.4 LBS | DIASTOLIC BLOOD PRESSURE: 70 MMHG | HEIGHT: 61 IN | SYSTOLIC BLOOD PRESSURE: 121 MMHG | HEART RATE: 69 BPM | OXYGEN SATURATION: 96 % | TEMPERATURE: 97.2 F | RESPIRATION RATE: 18 BRPM | BODY MASS INDEX: 24.05 KG/M2

## 2020-10-02 LAB
BASOPHILS ABSOLUTE: 0.1 K/UL (ref 0–0.2)
BASOPHILS RELATIVE PERCENT: 1.2 %
EOSINOPHILS ABSOLUTE: 0.2 K/UL (ref 0–0.6)
EOSINOPHILS RELATIVE PERCENT: 3.4 %
HCT VFR BLD CALC: 30.4 % (ref 36–48)
HEMOGLOBIN: 10.5 G/DL (ref 12–16)
LYMPHOCYTES ABSOLUTE: 1.6 K/UL (ref 1–5.1)
LYMPHOCYTES RELATIVE PERCENT: 24.1 %
MCH RBC QN AUTO: 33.8 PG (ref 26–34)
MCHC RBC AUTO-ENTMCNC: 34.4 G/DL (ref 31–36)
MCV RBC AUTO: 98.1 FL (ref 80–100)
MONOCYTES ABSOLUTE: 0.9 K/UL (ref 0–1.3)
MONOCYTES RELATIVE PERCENT: 13.7 %
NEUTROPHILS ABSOLUTE: 3.9 K/UL (ref 1.7–7.7)
NEUTROPHILS RELATIVE PERCENT: 57.6 %
PDW BLD-RTO: 13.8 % (ref 12.4–15.4)
PLATELET # BLD: 187 K/UL (ref 135–450)
PMV BLD AUTO: 8.3 FL (ref 5–10.5)
RBC # BLD: 3.1 M/UL (ref 4–5.2)
WBC # BLD: 6.7 K/UL (ref 4–11)

## 2020-10-02 PROCEDURE — 6370000000 HC RX 637 (ALT 250 FOR IP): Performed by: HOSPITALIST

## 2020-10-02 PROCEDURE — 36415 COLL VENOUS BLD VENIPUNCTURE: CPT

## 2020-10-02 PROCEDURE — 6360000002 HC RX W HCPCS: Performed by: HOSPITALIST

## 2020-10-02 PROCEDURE — C9113 INJ PANTOPRAZOLE SODIUM, VIA: HCPCS | Performed by: HOSPITALIST

## 2020-10-02 PROCEDURE — APPSS15 APP SPLIT SHARED TIME 0-15 MINUTES: Performed by: NURSE PRACTITIONER

## 2020-10-02 PROCEDURE — APPNB30 APP NON BILLABLE TIME 0-30 MINS: Performed by: NURSE PRACTITIONER

## 2020-10-02 PROCEDURE — 85025 COMPLETE CBC W/AUTO DIFF WBC: CPT

## 2020-10-02 PROCEDURE — 94760 N-INVAS EAR/PLS OXIMETRY 1: CPT

## 2020-10-02 PROCEDURE — 2580000003 HC RX 258: Performed by: HOSPITALIST

## 2020-10-02 PROCEDURE — 6370000000 HC RX 637 (ALT 250 FOR IP): Performed by: PHYSICIAN ASSISTANT

## 2020-10-02 RX ORDER — HYDROCORTISONE ACETATE 25 MG/1
25 SUPPOSITORY RECTAL 2 TIMES DAILY
Qty: 14 SUPPOSITORY | Refills: 0 | Status: SHIPPED | OUTPATIENT
Start: 2020-10-02 | End: 2020-10-09

## 2020-10-02 RX ADMIN — DILTIAZEM HYDROCHLORIDE 240 MG: 240 CAPSULE, EXTENDED RELEASE ORAL at 10:04

## 2020-10-02 RX ADMIN — HYDROCORTISONE ACETATE 25 MG: 25 SUPPOSITORY RECTAL at 10:04

## 2020-10-02 RX ADMIN — PANTOPRAZOLE SODIUM 40 MG: 40 INJECTION, POWDER, FOR SOLUTION INTRAVENOUS at 07:52

## 2020-10-02 RX ADMIN — Medication 10 ML: at 10:04

## 2020-10-02 ASSESSMENT — PAIN SCALES - GENERAL: PAINLEVEL_OUTOF10: 0

## 2020-10-02 NOTE — PROGRESS NOTES
Assessment complete, see doc flowsheet. Patient resting in bed, call light in reach, bed in lowest position, brake set. Patient denies any needs at this time. Patient encouraged to call if needs arise.   Howie Corcoran RN

## 2020-10-02 NOTE — TELEPHONE ENCOUNTER
[facility] Initial Follow Up Call    Outreach made within 2 business days of discharge: Yes    Patient: Terrence Ibrahim Patient : 4/10/1928   MRN: [id number]  Reason for Admission: There are no discharge diagnoses documented for the most recent discharge. Discharge Date: 10/2/20       Spoke with: patient    Discharge department/facility: [facility] Interactive Patient Contact:  Was patient able to fill all prescriptions: Yes  Was patient instructed to bring all medications to the follow-up visit: Yes  Is patient taking all medications as directed in the discharge summary?  Yes  Does patient understand their discharge instructions: Yes  Does patient have questions or concerns that need addressed prior to 7-14 day follow up office visit: no     Scheduled appointment with PCP within 7-14 days    Follow Up  Future Appointments   Date Time Provider Aixa Fernandes   10/6/2020  8:40 AM MD ALTHEA Dominguez [facility]   3/19/2021  8:20 AM Ainsley Cerda MD [facility]       Mildred Esquivel MA

## 2020-10-02 NOTE — PLAN OF CARE
Problem: Falls - Risk of:  Goal: Will remain free from falls  Description: Will remain free from falls  Note: Patient free from harm. ID bands on, bed in lowest position, call light in reach. Patient instructed to call for help if needed. Patient educated on ambulation and safety. Problem: Bleeding:  Goal: Will show no signs and symptoms of excessive bleeding  Description: Will show no signs and symptoms of excessive bleeding  Note: Pt with no more blood at BM. Problem: Bleeding:  Goal: Will show no signs and symptoms of excessive bleeding  Description: Will show no signs and symptoms of excessive bleeding  Note: Pt with no more blood at BM.

## 2020-10-02 NOTE — PROGRESS NOTES
[facility] 83 [facility]        Progress Note    Patient Name: Fiorella Avendaño  MRN: [id number]  YOB: 1928  Date of Evaluation: 10/2/2020    Chief Complaint: Rectal bleeding    Subjective:  No acute events overnight  Denies chest pain, abdominal pain, or SOB  No further bleeding since resuming Xarelto, reports a normal brown BM  Resting in bed at this time      Vital Signs:  Patient Vitals for the past 24 hrs:   BP Temp Temp src Pulse Resp SpO2 Weight   10/02/20 0957 121/70 97.2 °F (36.2 °C) Temporal 69 18 96 % --   10/02/20 0917 -- -- -- -- 16 96 % --   10/02/20 0751 -- -- -- -- -- -- 127 lb 6.4 oz (57.8 kg)   10/02/20 0412 131/83 97.8 °F (36.6 °C) Oral 61 16 93 % --   10/01/20 2329 126/69 97.3 °F (36.3 °C) Temporal 68 16 92 % --   10/01/20 2015 121/75 98.3 °F (36.8 °C) Temporal 70 16 93 % --   10/01/20 1500 124/69 98.1 °F (36.7 °C) Temporal 62 16 94 % --      TEMPERATURE HISTORY 24H: Temp (24hrs), Av.7 °F (36.5 °C), Min:97.2 °F (36.2 °C), Max:98.3 °F (36.8 °C)    BLOOD PRESSURE HISTORY: Systolic (86RLQ), ITL:737 , Min:120 , RDK:745    Diastolic (90VQC), RQL:60, Min:69, Max:83      Intake/Output:  No intake/output data recorded. No intake/output data recorded. Drain/tube Output:       Physical Exam:  General: awake, alert, oriented to  person, place, time  Cardiovascular:  regular rate and rhythm and no murmur noted  Lungs: clear to auscultation    Labs:  CBC:    Recent Labs     20  0443 10/01/20  0436 10/02/20  0429   WBC 7.4 7.0 6.7   HGB 10.7* 9.8* 10.5*   HCT 31.3* 28.7* 30.4*    171 187     BMP:    No results for input(s): NA, K, CL, CO2, BUN, CREATININE, GLUCOSE in the last 72 hours. Hepatic:  No results for input(s): AST, ALT, ALB, BILITOT, ALKPHOS in the last 72 hours.   Amylase:  No results found for: AMYLASE  Lipase:  No results found for: LIPASE   Mag:  No results found for: MG  Phos:   No results found for: PHOS   Coags:   Lab Results Component Value Date    PROTIME 14.0 09/29/2020    INR 1.20 09/29/2020    APTT 38.8 09/25/2020       Cultures:  Anaerobic culture  No results found for: LABANAE  Fungus stain  No results found for requested labs within last 30 days. Gram stain  No results found for requested labs within last 30 days. Organism  Lab Results   Component Value Date/Time    ORG Klebsiella pneumoniae ssp pneumoniae (A) 04/15/2016 05:00 PM     Surgical culture  No results found for: CXSURG  Blood culture 1  No results found for requested labs within last 30 days. Blood culture 2  No results found for requested labs within last 30 days. Fecal occult  No results found for requested labs within last 30 days. GI bacterial pathogens by PCR  No results found for requested labs within last 30 days. C. difficile  No results found for requested labs within last 30 days. Urine culture  Lab Results   Component Value Date    LABURIN >100,000 CFU/ml 04/15/2016       Pathology:  \"Relevant pathology documented under results section     Imaging:  I have personally reviewed the following films:    No results found. Scheduled Meds:   rivaroxaban  15 mg Oral Daily    influenza virus vaccine  0.5 mL Intramuscular Once    hydrocortisone  25 mg Rectal BID    lovastatin  40 mg Oral Nightly    dilTIAZem  240 mg Oral Daily    sodium chloride flush  10 mL Intravenous 2 times per day    pantoprazole  40 mg Intravenous Q12H    And    sodium chloride (PF)  10 mL Intravenous Q12H     Continuous Infusions:  PRN Meds:.polyethylene glycol, sodium chloride flush, acetaminophen **OR** acetaminophen, promethazine **OR** ondansetron      Assessment:  80 y.o. female admitted with   1. Rectal bleeding    2. Anticoagulated    3. Acute renal failure, unspecified acute renal failure type ([address]        Hemorrhoidal bleeding  Acute blood loss anemia  Atrial fibrillation, on Xarelto  Acute kidney injury      Plan:  1.  No further bleeding; no plans for EUA at this time  2. General diet as tolerated  3. Monitor for bleeding, hemoglobin remains stable  4. Activity as tolerated  5. DVT prophylaxis with SCD's , Xarelto resumed on 10/1/2020  6. Management of medical comorbid etiologies per primary team and consulting services  7. Disposition: Okay for discharge home; follow up with Dr. Ten Koo as needed    EDUCATION:  Educated patient on plan of care and disease process--all questions answered. Plans discussed with patient and nursing. Reviewed and discussed with Dr. Ten Koo.       Signed:  OZZIE Mederos CNP  10/2/2020 12:25 PM

## 2020-10-02 NOTE — TELEPHONE ENCOUNTER
Reason for Disposition   General information question, no triage required and triager able to answer question    Protocols used: INFORMATION ONLY CALL - NO TRIAGE-ADULT-AH    Received call from [facility]. Pt granddaughter on the phone needing ot make a hospital follow up appt. States just left the hospital today and was told to make an appt for follow up. No worsening or new symptoms. Call soft transferred to [facility] in [address] to schedule appointment. Attention Provider: Thank you for allowing me to participate in the care of your patient. The  patient was connected to triage in response to information provided to the [facility]. Please do not respond through this encounter as the response is not directed to a shared pool.

## 2020-10-02 NOTE — DISCHARGE SUMMARY
Hospital Medicine Discharge Summary    Patient: Zac Ramirez     Gender: female  : 4/10/1928   Age: 80 y.o. MRN: [id number]    Admitting Physician: Desean Larsen MD  Discharge Physician: Gwynda Baumgarten, MD     Code Status: Full Code     Admit Date: 2020   Discharge Date:   10/2/2020    Disposition:  Home  Time spent arranging discharge: 35 minutes    Discharge Diagnoses: Active Hospital Problems    Diagnosis Date Noted    Rectal bleeding [K62.5] 2020    Acute blood loss anemia [D62] 2020    CHACHA (acute kidney injury) ([facility] [address] [N17.9] 2020    Mixed hyperlipidemia [E78.2] 2016    Chronic atrial fibrillation [I48.20] 04/15/2016    Essential hypertension [I10] 04/15/2016         Condition at Discharge:  [address] Course:   Patient admitted to the hospital with acute blood loss anemia secondary to GI bleed. Patient had colonoscopy which showed a 18 mm sessile AC polyp which was hot snared path was tubular adenoma. Patient also had diverticulosis and hemorrhoids. Felt hemorrhage was likely cause of bleed. Patient hemoglobin remained stable Xarelto was resumed and patient was monitored over 24 hours and had no further bleeding patient was discharged home with follow-up with PCP  Discharge Exam:    /83   Pulse 61   Temp 97.8 °F (36.6 °C) (Oral)   Resp 16   Ht 5' 1\" (1.549 m)   Wt 127 lb 6.4 oz (57.8 kg)   SpO2 93%   BMI 24.07 kg/m²   General appearance:  Appears comfortable. AAOx3  HEENT: atraumatic, Pupils equal, muscous membranes moist, no masses appreciated  Cardiovascular: Regular rate and rhythm no murmurs appreciated  Respiratory: CTAB no wheezing  Gastrointestinal: Abdomen soft, non-tender, BS+  EXT: no edema  Neurology: no gross focal deficts  Psychiatry: Appropriate affect.  Not agitated  Skin: Warm, dry, no rashes appreciated    Discharge Medications:   Current Discharge Medication List      START taking these medications    Details Visit Number      [id number]           Age                 80 year(s)   Accession Number  [id number]          Room Number         2758   Corporate ID      [id number]             Sonographer         Liana MasseyT, RDMS, AB,                                                            OB/GYN   Ordering          Author Dick, Interpreting        [facility]  Physician         MD                  Physician           Wilton Oliveros MD,                                                            [facility]  Procedure Type of Study:   Veins:Lower Extremities DVT Study, VL EXTREMITY VENOUS DUPLEX LEFT. Vascular Sonographer Report  Additional Indications:Pt states her left leg has been swollen for over 40 years. Impressions Left Impression No evidence of deep vein or superficial vein thrombosis involving the left lower extremity and the right common femoral vein. Calf and ankle edema noted. Calf veins were not well visualized on the left. Conclusions   Summary   No evidence of deep vein or superficial vein thrombosis involving the left  lower extremity and the right common femoral vein. Signature   ------------------------------------------------------------------  Electronically signed by Wilton Oliveros MD, [facility] (Interpreting  physician) on 09/26/2020 at 10:03 PM  ------------------------------------------------------------------  Patient Status:Routine. [address] [place] [address] Vascular Lab. Velocities are measured in cm/s ; Diameters are measured in mm Right Lower Extremities DVT Study Measurements Right 2D Measurements +--------------+----------+---------------+----------+ ! Location      ! Visualized! Compressibility! Thrombosis! +--------------+----------+---------------+----------+ ! Common Femoral!Yes       ! Yes            ! None      ! +--------------+----------+---------------+----------+ Right Doppler Measurements +--------------+------+------+------------+ ! Location      ! Signal!Reflux! Reflux (sec)! +--------------+------+------+------------+ ! Common Femoral!Phasic!      !            ! +--------------+------+------+------------+ Left Lower Extremities DVT Study Measurements Left 2D Measurements +------------------------+----------+---------------+----------+ ! Location                ! Visualized! Compressibility! Thrombosis! +------------------------+----------+---------------+----------+ ! Sapheno Femoral Junction! Yes       ! Yes            ! None      ! +------------------------+----------+---------------+----------+ ! GSV Thigh               ! Yes       ! Yes            ! None      ! +------------------------+----------+---------------+----------+ ! Common Femoral          !Yes       ! Yes            ! None      ! +------------------------+----------+---------------+----------+ ! Prox Femoral            !Yes       ! Yes            ! None      ! +------------------------+----------+---------------+----------+ ! Mid Femoral             !Yes       ! Yes            ! None      ! +------------------------+----------+---------------+----------+ ! Dist Femoral            !Yes       ! Yes            ! None      ! +------------------------+----------+---------------+----------+ ! Deep Femoral            !Yes       ! Yes            ! None      ! +------------------------+----------+---------------+----------+ ! Popliteal               !Yes       ! Yes            ! None      ! +------------------------+----------+---------------+----------+ ! GSV Below Knee          ! Yes       ! Yes            ! None      ! +------------------------+----------+---------------+----------+ ! Gastroc                 ! Yes       ! Yes            ! None      ! +------------------------+----------+---------------+----------+ ! PTV                     ! Partial   !Yes            ! None      ! +------------------------+----------+---------------+----------+ ! Peroneal                !Partial !Yes            !None      ! +------------------------+----------+---------------+----------+ ! GSV Calf                ! Yes       ! Yes            ! None      ! +------------------------+----------+---------------+----------+ ! SSV                     ! Yes       ! Yes            ! None      ! +------------------------+----------+---------------+----------+ Left Doppler Measurements +------------------------+------+------+------------+ ! Location                ! Signal!Reflux! Reflux (sec)! +------------------------+------+------+------------+ ! Sapheno Femoral Junction! Phasic!      !            ! +------------------------+------+------+------------+ ! Common Femoral          !Phasic!      !            ! +------------------------+------+------+------------+ ! Femoral                 !Phasic!      !            ! +------------------------+------+------+------------+ ! Deep Femoral            !Phasic!      !            ! +------------------------+------+------+------------+ ! Popliteal               !Phasic!      !            ! +------------------------+------+------+------------+    Cta Abdomen Pelvis W Wo Contrast    Result Date: 9/26/2020  EXAMINATION: CTA OF THE ABDOMEN AND PELVIS WITH AND WITHOUT CONTRAST 9/25/2020 9:08 pm TECHNIQUE: CTA of the abdomen and pelvis was performed without and with the administration of intravenous contrast. Multiplanar reformatted images are provided for review. MIP images are provided for review. Dose modulation, iterative reconstruction, and/or weight based adjustment of the mA/kV was utilized to reduce the radiation dose to as low as reasonably achievable. COMPARISON: None. HISTORY: ORDERING SYSTEM PROVIDED HISTORY: BRBPR TECHNOLOGIST PROVIDED HISTORY: Reason for exam:->BRBPR Reason for Exam: Rectal Bleeding (Has noticed some bleeding from her rectum for the last 10 days, today became much worse.  Bright red blood) FINDINGS: Lower Chest: Mild fibrosis is noted within the lower lungs bilaterally. There is a moderate to large hiatal hernia. Base of the heart is unremarkable. No pericardial effusion. Visualized extra thoracic soft tissues unremarkable. Organs: No acute or suspicious hepatic abnormality is identified. No hypervascular abnormalities are detected. The portal vein is patent. The gallbladder is unremarkable. Normal spleen, adrenals, and pancreas. No acute renal abnormalities are identified. GI/Bowel: There is mild diverticulosis of the large bowel, but without CT evidence of diverticulitis. There is a questionable very small focus active extravasation seen very distally near the anorectal verge (arterial phase image 150, delayed image 149). However, that could also potentially be a very small vessel. Moderate amount of stool is noted within the large bowel more proximally. The appendix is not well visualized but no asymmetric pericecal inflammation is seen. Again, there is a moderate to large hiatal hernia. The stomach, duodenal sweep, and the remainder of the small bowel are unremarkable in appearance. No evidence of bowel obstruction. Pelvis: Uterus and adnexa regions unremarkable for patient's age. Urinary bladder unremarkable. No free pelvic fluid is found. Peritoneum/Retroperitoneum: Abdominal aorta normal in caliber. No retroperitoneal lymphadenopathy. Mesenteric arteries are normally enhancing. Bones/Soft Tissues: Chronic appearing compression deformity is seen involving the T12 vertebral body. No acute bony abnormalities are detected. A definite focus of active extravasation is not identified. There is a questionable small focus of active extravasation seen very distally near the anorectal verge posteriorly. However, that could potentially represent a small vessel is well. Mild diverticulosis of the large bowel, but without CT evidence of diverticulitis. Moderate to large hiatal hernia.          Signed:    Inocencio Jenkins MD   10/2/2020

## 2020-10-02 NOTE — TELEPHONE ENCOUNTER
Call patient and schedule hospital f/u please. Please make sure to do what is needed for it to be a hospital f/u appointment.

## 2020-10-06 ENCOUNTER — TELEMEDICINE (OUTPATIENT)
Dept: FAMILY MEDICINE CLINIC | Age: 85
End: 2020-10-06
Payer: MEDICARE

## 2020-10-06 PROCEDURE — 1111F DSCHRG MED/CURRENT MED MERGE: CPT | Performed by: FAMILY MEDICINE

## 2020-10-06 PROCEDURE — 99496 TRANSJ CARE MGMT HIGH F2F 7D: CPT | Performed by: FAMILY MEDICINE

## 2020-10-06 RX ORDER — DIAPER,BRIEF,INFANT-TODD,DISP
EACH MISCELLANEOUS
Qty: 1 TUBE | Refills: 1 | Status: SHIPPED | OUTPATIENT
Start: 2020-10-06 | End: 2020-10-13

## 2020-10-06 NOTE — PROGRESS NOTES
Post-Discharge Transitional Care Management Services or Hospital Follow Up      Judith Moss   YOB: 1928    Date of Office Visit:  10/6/2020  Date of Hospital Admission: 9/25/20  Date of Hospital Discharge: 10/2/20  Readmission Risk Score(high >=14%. Medium >=10%):Readmission Risk Score: 10      Care management risk score Rising risk (score 2-5) and Complex Care (Scores >=6): 0     Non face to face  following discharge, date last encounter closed (first attempt may have been earlier): 10/2/2020  3:06 PM 10/2/2020  3:06 PM    Call initiated 2 business days of discharge: Yes     Patient Active Problem List   Diagnosis    Chronic atrial fibrillation    Essential hypertension    Mixed hyperlipidemia    Ankle swelling    Chronic back pain    Rectal bleeding    Acute blood loss anemia    CHACHA (acute kidney injury) ([facility] [address]       No Known Allergies    Medications listed as ordered at the time of discharge from hospital   [place], [address] Medication Instructions RAHEEM:    Printed on:10/06/20 0900   Medication Information                      Blood Pressure KIT  1 each by Does not apply route once a week             diltiazem (CARDIZEM CD) 240 MG ER capsule  Take 1 capsule by mouth daily             famotidine (PEPCID) 20 MG tablet  Take 1 tablet by mouth 2 times daily             furosemide (LASIX) 40 MG tablet  Take 40 mg by mouth as needed             hydrocortisone (ALA-ZAID) 1 % cream  Apply topically 2 times daily. hydrocortisone (ANUSOL-HC) 25 MG suppository  Place 1 suppository rectally 2 times daily for 7 days             lovastatin (MEVACOR) 40 MG tablet  Take 40 mg by mouth nightly.              polyethylene glycol (GLYCOLAX) 17 g packet  Take 17 g by mouth daily as needed for Constipation             rivaroxaban (XARELTO) 15 MG TABS tablet  Take 1 tablet by mouth daily                   Medications marked \"taking\" at this time  Outpatient Medications Marked as Taking for the 10/6/20 encounter (Telemedicine) with Ainsley Cerda MD   Medication Sig Dispense Refill    hydrocortisone (ALA-ZAID) 1 % cream Apply topically 2 times daily. 1 Tube 1        Medications patient taking as of now reconciled against medications ordered at time of hospital discharge: Yes    Chief Complaint   Patient presents with   [address] from Hospital       HPI    Inpatient course: Discharge summary reviewed- see chart. Interval history/Current status: The patient was admitted to the hospital with acute blood loss secondary to a giant GI bleed. She had a colonoscopy which showed a polyp that was a tubular adenoma. The bleeding was thought to be related to hemorrhoids - on annusol suppositories. She was evaluated by general surgery in the hospital as she had bleeding the day after surgery. Her Xarelto was initially held but resumed prior to discharge. Her hemoglobin was stable at the time of discharge. The patient reports she has not had any additional bleeding. She is feeling good except for a mild r sided abdominal discomfort that is noticeable when she moves around. She reports it was present before she was in the hospital. No pain with palpation. No worse with eating. No nausea or vomiting. No diarrhea or constipation. Atrial Fibrillation: Granddaughter reports the patient was in afib the whole time in the hospital. The patient is on xarelto for anticoagulation - reports she has always been on 15 mg and Cardizem for rate control. Hx of seeing Dr. Navya Montague last in 2017. Kirsten Diaz has been stable on this regimen a long time.      HTN: The patient is on Diltiazem. BP was 133/70 last night.      HLD: She is on Mevacor.      CKD stage 3: Will do BMP     Ankle edema: the patient takes lasix as needed. Hasn't needed it in months.      GERD:  Takes pepcid BID.  Controls her symptoms well.      Hx of fall 3/20:  Slipped falling out of the shower.  H She declined additional intervention.  Granddaughter has purchase a bathtub seat now and has made arrangements so it is safer.      Hx of epistasix: hx of seeing ENT in 2019. Given bactroban and saline gel to use.      Hx of ARF 2/2 sepsis- .       Hx of skin cancer: ? type     Hx of cataract surgery      SH:  6/15/20: she lives with her granddaughter Carly who is age 31(works for [employer] for [employer]?)  and has lived with her since birth.  Patient's daughter used to live with them too but she  10 years ago. No other children. Review of Systems    Complete review of systems negative except as documented in the HPI. There were no vitals filed for this visit. There is no height or weight on file to calculate BMI. Wt Readings from Last 3 Encounters:   10/02/20 127 lb 6.4 oz (57.8 kg)   09/15/20 129 lb (58.5 kg)   20 135 lb (61.2 kg)     BP Readings from Last 3 Encounters:   10/02/20 121/70   20 (!) 96/55   20 (!) 122/56       Physical Exam  Vitals reviewed: Mental Status: The patient is awake and alert. Constitutional:       General: She is not in acute distress. Appearance: Normal appearance. She is not ill-appearing. HENT:      Head: Normocephalic and atraumatic. Right Ear: External ear normal.      Left Ear: External ear normal.      Nose: Nose normal.      Mouth/Throat:      Mouth: Mucous membranes are moist.   Eyes:      General:         Right eye: No discharge. Left eye: No discharge. Extraocular Movements: Extraocular movements intact. Neck:      Musculoskeletal: Normal range of motion. Comments: No visualized neck masses. Pulmonary:      Effort: Pulmonary effort is normal. No respiratory distress. Musculoskeletal: Normal range of motion. Comments: Nl ROM of the Neck. Skin:     General: Skin is dry. Coloration: Skin is not pale. Comments: Skin without any obvious and significant discoloration or abnormalities.     Neurological:      Mental Status: She is alert. Mental status is at baseline. Comments: Cranial nerves are grossly intact. No visible facial asymmetry. Psychiatric:         Mood and Affect: Mood normal.         Behavior: Behavior normal.         Thought Content: Thought content normal.         Judgment: Judgment normal.             Assessment/Plan:  1. Hospital discharge follow-up  - UT DISCHARGE MEDS RECONCILED W/ CURRENT OUTPATIENT MED LIST    2. Renal insufficiency  Stable. Continue current regimen. Recheck. - Basic Metabolic Panel; Future    3. Hemorrhoids, unspecified hemorrhoid type  Improved. Continue anusol. Recheck CBC. Return precautions reviewed. - CBC Auto Differential; Future    4. Chronic atrial fibrillation  Stable. Continue current regimen. Back on Xarelto with no bleeding. 5. Essential hypertension  Stable. Continue current regimen. Continue to monitor BP. 6. Mixed hyperlipidemia  Stable. Continue current regimen. 7. Rectal bleeding  Resolved. 8. CHACHA (acute kidney injury) ([facility] [address]  Resolved.  Recheck BMP        Medical Decision Making: high complexity

## 2020-10-16 ENCOUNTER — HOSPITAL ENCOUNTER (OUTPATIENT)
Age: 85
Discharge: HOME | End: 2020-10-16
Payer: MEDICARE

## 2020-10-16 LAB
ANION GAP SERPL CALCULATED.3IONS-SCNC: 14 MMOL/L (ref 3–16)
BASOPHILS ABSOLUTE: 0.1 K/UL (ref 0–0.2)
BASOPHILS RELATIVE PERCENT: 0.7 %
BUN BLDV-MCNC: 24 MG/DL (ref 7–20)
CALCIUM SERPL-MCNC: 9.9 MG/DL (ref 8.3–10.6)
CHLORIDE BLD-SCNC: 102 MMOL/L (ref 99–110)
CO2: 24 MMOL/L (ref 21–32)
CREAT SERPL-MCNC: 1.3 MG/DL (ref 0.6–1.2)
EOSINOPHILS ABSOLUTE: 0.1 K/UL (ref 0–0.6)
EOSINOPHILS RELATIVE PERCENT: 0.8 %
GFR AFRICAN AMERICAN: 46
GFR NON-AFRICAN AMERICAN: 38
GLUCOSE BLD-MCNC: 100 MG/DL (ref 70–99)
HCT VFR BLD CALC: 36 % (ref 36–48)
HEMOGLOBIN: 11.9 G/DL (ref 12–16)
LYMPHOCYTES ABSOLUTE: 1.9 K/UL (ref 1–5.1)
LYMPHOCYTES RELATIVE PERCENT: 17.8 %
MCH RBC QN AUTO: 31.7 PG (ref 26–34)
MCHC RBC AUTO-ENTMCNC: 33 G/DL (ref 31–36)
MCV RBC AUTO: 96.1 FL (ref 80–100)
MONOCYTES ABSOLUTE: 1.1 K/UL (ref 0–1.3)
MONOCYTES RELATIVE PERCENT: 11 %
NEUTROPHILS ABSOLUTE: 7.3 K/UL (ref 1.7–7.7)
NEUTROPHILS RELATIVE PERCENT: 69.7 %
PDW BLD-RTO: 13.8 % (ref 12.4–15.4)
PLATELET # BLD: 308 K/UL (ref 135–450)
PMV BLD AUTO: 8.6 FL (ref 5–10.5)
POTASSIUM SERPL-SCNC: 4.9 MMOL/L (ref 3.5–5.1)
RBC # BLD: 3.75 M/UL (ref 4–5.2)
SODIUM BLD-SCNC: 140 MMOL/L (ref 136–145)
WBC # BLD: 10.4 K/UL (ref 4–11)

## 2020-10-16 PROCEDURE — 36415 COLL VENOUS BLD VENIPUNCTURE: CPT

## 2020-10-16 PROCEDURE — 80048 BASIC METABOLIC PNL TOTAL CA: CPT

## 2020-10-16 PROCEDURE — 85025 COMPLETE CBC W/AUTO DIFF WBC: CPT

## 2020-12-16 ENCOUNTER — PATIENT MESSAGE (OUTPATIENT)
Dept: FAMILY MEDICINE CLINIC | Age: 85
End: 2020-12-16

## 2020-12-17 RX ORDER — RIVAROXABAN 15 MG/1
TABLET, FILM COATED ORAL
Qty: 90 TABLET | Refills: 1 | Status: SHIPPED | OUTPATIENT
Start: 2020-12-17 | End: 2021-06-15

## 2020-12-17 NOTE — TELEPHONE ENCOUNTER
Medication:   Requested Prescriptions     Pending Prescriptions Disp Refills    XARELTO 15 MG TABS tablet [Pharmacy Med Name: Ambar Flow 15 MG TABLET] 90 tablet 1     Sig: TAKE 1 TABLET BY MOUTH EVERY DAY        Last Filled:  06.15.2020 #90 w/ 1 refill     Patient Phone Number: [phone] (home)     Last appt: 10/6/2020   Next appt: 3/19/2021    Last OARRS: No flowsheet data found.

## 2020-12-18 ENCOUNTER — TELEPHONE (OUTPATIENT)
Dept: FAMILY MEDICINE CLINIC | Age: 85
End: 2020-12-18

## 2020-12-18 RX ORDER — FUROSEMIDE 40 MG/1
40 TABLET ORAL PRN
Qty: 60 TABLET | Refills: 0 | Status: SHIPPED | OUTPATIENT
Start: 2020-12-18 | End: 2021-05-07 | Stop reason: SDUPTHER

## 2020-12-18 RX ORDER — DILTIAZEM HYDROCHLORIDE 240 MG/1
240 CAPSULE, COATED, EXTENDED RELEASE ORAL DAILY
Qty: 30 CAPSULE | Refills: 11 | Status: SHIPPED | OUTPATIENT
Start: 2020-12-18 | End: 2020-12-21

## 2020-12-18 NOTE — TELEPHONE ENCOUNTER
Patient has actually taken Caridzem LA not dilTIAZem (CARDIZEM CD) 240 MG   Wrong RX called in today

## 2020-12-21 RX ORDER — DILTIAZEM HYDROCHLORIDE EXTENDED-RELEASE TABLETS 240 MG/1
240 TABLET, EXTENDED RELEASE ORAL DAILY
Qty: 30 CAPSULE | Refills: 0 | Status: SHIPPED | OUTPATIENT
Start: 2020-12-21 | End: 2021-01-12

## 2020-12-21 NOTE — TELEPHONE ENCOUNTER
LMOM for daughter to call office to confirm that medication is correct and that they were able to pick it up.

## 2020-12-21 NOTE — TELEPHONE ENCOUNTER
Daughter confirmed,correct dose. She has been out for 4 days and usually takes the med in the morning. Daughter was told to give it to her this evening when it is picked up. Tomorrow a little earlier than today and so on until she is back on her morning schedule.

## 2020-12-21 NOTE — TELEPHONE ENCOUNTER
I sent in cardizem LA- please call daughter and make sure they now have what they needed and that this is the same med and dose she has been on.

## 2020-12-21 NOTE — TELEPHONE ENCOUNTER
Sarah Anton calling back to see if this message has been addressed. She says when patient was admitted to the hospital they did not have the Cardizem LA, so they gave her what they had and so this is how the medication was added to her med list.      Please send in a new script for Cardizem LA. Songeric Francis says patient has been out of this medication since Thursday.

## 2021-01-10 ENCOUNTER — APPOINTMENT (OUTPATIENT)
Dept: GENERAL RADIOLOGY | Age: 86
End: 2021-01-10
Payer: MEDICARE

## 2021-01-10 ENCOUNTER — HOSPITAL ENCOUNTER (EMERGENCY)
Age: 86
Discharge: HOME | End: 2021-01-10
Attending: EMERGENCY MEDICINE
Payer: MEDICARE

## 2021-01-10 VITALS
OXYGEN SATURATION: 99 % | BODY MASS INDEX: 24.4 KG/M2 | WEIGHT: 124.3 LBS | TEMPERATURE: 97.9 F | HEIGHT: 60 IN | RESPIRATION RATE: 18 BRPM | DIASTOLIC BLOOD PRESSURE: 83 MMHG | HEART RATE: 95 BPM | SYSTOLIC BLOOD PRESSURE: 144 MMHG
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
## 2021-01-10 PROCEDURE — 6370000000 HC RX 637 (ALT 250 FOR IP): Performed by: EMERGENCY MEDICINE

## 2021-01-10 PROCEDURE — 99283 EMERGENCY DEPT VISIT LOW MDM: CPT

## 2021-01-10 PROCEDURE — 73090 X-RAY EXAM OF FOREARM: CPT

## 2021-01-10 RX ORDER — ACETAMINOPHEN 325 MG/1
650 TABLET ORAL ONCE
Status: COMPLETED | OUTPATIENT
Start: 2021-01-10 | End: 2021-01-10

## 2021-01-10 RX ORDER — AMOXICILLIN AND CLAVULANATE POTASSIUM 875; 125 MG/1; MG/1
1 TABLET, FILM COATED ORAL 2 TIMES DAILY
Qty: 10 TABLET | Refills: 0 | Status: SHIPPED | OUTPATIENT
Start: 2021-01-10 | End: 2021-01-15

## 2021-01-10 RX ORDER — AMOXICILLIN AND CLAVULANATE POTASSIUM 875; 125 MG/1; MG/1
1 TABLET, FILM COATED ORAL ONCE
Status: COMPLETED | OUTPATIENT
Start: 2021-01-10 | End: 2021-01-10

## 2021-01-10 RX ADMIN — Medication 3 ML: at 21:07

## 2021-01-10 RX ADMIN — ACETAMINOPHEN 650 MG: 325 TABLET ORAL at 21:05

## 2021-01-10 RX ADMIN — AMOXICILLIN AND CLAVULANATE POTASSIUM 1 TABLET: 875; 125 TABLET, FILM COATED ORAL at 22:04

## 2021-01-10 ASSESSMENT — PAIN SCALES - GENERAL
PAINLEVEL_OUTOF10: 2
PAINLEVEL_OUTOF10: 2

## 2021-01-10 ASSESSMENT — ENCOUNTER SYMPTOMS
ABDOMINAL PAIN: 0
EYE PAIN: 0
COUGH: 0

## 2021-01-10 ASSESSMENT — PAIN DESCRIPTION - FREQUENCY: FREQUENCY: CONTINUOUS

## 2021-01-10 ASSESSMENT — PAIN DESCRIPTION - DESCRIPTORS: DESCRIPTORS: BURNING

## 2021-01-11 NOTE — ED NOTES
Wound irrigated with NSS. Pt tolerated well. mepitel dressing placed to left arm wound. Wound to right arm cleaned and steri strips placed.       Stephen Ureña RN  01/10/21 6036

## 2021-01-11 NOTE — ED PROVIDER NOTES
[address]      Pt Name: Eav Altamirano  MRN: [id number]  [place] 4/10/1928  Date of evaluation: 1/10/2021  Provider: Lay Guevara MD    CHIEF COMPLAINT       Chief Complaint   Patient presents with    Animal Bite     left forearm     HISTORY OF PRESENT ILLNESS  (Location/Symptom, Timing/Onset,Context/Setting, Quality, Duration, Modifying Factors, Severity). Note limiting factors. Eva Altamirano is a 80 y.o. female who presents to the emergency department with her daughter status post dog bite. It is the daughter's dog, dog is a [place], rabies are up-to-date. Daughter states dog is very protective of her when she is home. Patient states she is having some pain at the site of the bite. After the bite she did move backwards and hit her right arm as well. Small laceration there. Neither wound site is actively bleeding now. She is not sure when her last tetanus was but thinks it was within the last 10 years. Patient and daughter both state patient did not fall or hit her head or lose consciousness. The dog did not bite her anywhere else. Patient denies any numbness or tingling. Denies any weakness of her  strength. Most pain is around the site of the bite and worse with palpation around the site. Did not take any medication prior to arrival.  Daughter states there is not a significant amount of blood at the scene. She does not smoke, she is not a diabetic. Past medical history noted below, significant for A. fib (is on Xarelto, is taking it as prescribed), skin cancer, hyperlipidemia, hypertension. Daughter reports she did get bit by a dog years ago on the leg and required a wound VAC. Aside from what is stated above denies any other symptoms or modifying factors. Nursing Notes reviewed.     REVIEW OF SYSTEMS  (2-9 systems for level 4, 10 or more for level 5)   Review of Systems Constitutional: Negative for chills and fever. HENT: Negative. Eyes: Negative for pain. Respiratory: Negative for cough. Cardiovascular: Negative for chest pain. Gastrointestinal: Negative for abdominal pain. Musculoskeletal: Negative for gait problem. Skin: Positive for wound. PAST MEDICAL HISTORY     Past Medical History:   Diagnosis Date    Atrial fibrillation ([address]     History of skin cancer     Hyperlipidemia     Hypertension        SURGICALHISTORY       Past Surgical History:   Procedure Laterality Date    COLONOSCOPY N/A 9/28/2020    COLONOSCOPY POLYPECTOMY SNARE/COLD BIOPSY performed by Amber Quan MD at [address]  9/28/2020    COLONOSCOPY SUBMUCOSAL/BOTOX INJECTION performed by Amber Quan MD at [facility] [address]       Previous Medications    BLOOD PRESSURE KIT    1 each by Does not apply route once a week    DILTIAZEM HCL ER COATED BEADS (CARDIZEM LA) 240 MG TB24    Take 1 tablet by mouth daily    FAMOTIDINE (PEPCID) 20 MG TABLET    Take 1 tablet by mouth 2 times daily    FUROSEMIDE (LASIX) 40 MG TABLET    Take 1 tablet by mouth as needed (edema)    LOVASTATIN (MEVACOR) 40 MG TABLET    Take 40 mg by mouth nightly.     POLYETHYLENE GLYCOL (GLYCOLAX) 17 G PACKET    Take 17 g by mouth daily as needed for Constipation    XARELTO 15 MG TABS TABLET    TAKE 1 TABLET BY MOUTH EVERY DAY      ALLERGIES     Aleve [naproxen]  FAMILY HISTORY       Family History   Problem Relation Age of Onset    Heart Disease Neg Hx      SOCIAL HISTORY       Social History     Socioeconomic History    Marital status:      Spouse name: None    Number of children: None    Years of education: None    Highest education level: None   Occupational History    None   Social Needs    Financial resource strain: None    Food insecurity     Worry: None     Inability: None    Transportation needs     Medical: None     Non-medical: None   Tobacco Use  Smoking status: Never Smoker    Smokeless tobacco: Never Used   Substance and Sexual Activity    Alcohol use: No    Drug use: Never    Sexual activity: None   Lifestyle    Physical activity     Days per week: None     Minutes per session: None    Stress: None   Relationships    Social connections     Talks on phone: None     Gets together: None     Attends [employer] service: None     Active member of club or organization: None     Attends meetings of clubs or organizations: None     Relationship status: None    Intimate partner violence     Fear of current or ex partner: None     Emotionally abused: None     Physically abused: None     Forced sexual activity: None   Other Topics Concern    None   Social History Narrative    None     SCREENINGS         PHYSICAL EXAM  (up to 7 for level 4, 8 or more for level 5)   INITIAL VITALS: BP: (!) 144/83, Temp: 97.9 °F (36.6 °C), Pulse: 95, Resp: 18, SpO2: 99 %   Physical Exam  Vitals signs reviewed. Constitutional:       General: She is in acute distress (with palpation of left mid forearm). Appearance: She is not ill-appearing or toxic-appearing. HENT:      Head: Normocephalic and atraumatic. Right Ear: External ear normal.      Left Ear: External ear normal.      Nose: Nose normal.      Mouth/Throat:      Mouth: Mucous membranes are moist.   Eyes:      General: No scleral icterus. Right eye: No discharge. Left eye: No discharge. Extraocular Movements: Extraocular movements intact. Conjunctiva/sclera: Conjunctivae normal.   Neck:      Musculoskeletal: No neck rigidity. Trachea: No tracheal deviation. Cardiovascular:      Rate and Rhythm: Normal rate. Pulses: Normal pulses. Comments: Radial pulses easily palpable and equal bilaterally  Pulmonary:      Effort: Pulmonary effort is normal. No respiratory distress. Abdominal:      General: There is no distension. Tenderness: There is no abdominal tenderness. There is no right CVA tenderness, left CVA tenderness or guarding. Musculoskeletal:         General: Swelling present. Right shoulder: She exhibits no bony tenderness. Left shoulder: She exhibits no bony tenderness. Left elbow: Normal. She exhibits normal range of motion, no swelling, no effusion, no deformity and no laceration. Left wrist: Normal. She exhibits normal range of motion, no tenderness, no bony tenderness, no swelling, no effusion, no crepitus and no laceration. Right forearm: She exhibits laceration (small ~ 2cm, not actively bleeding). She exhibits no bony tenderness and no deformity. Left forearm: She exhibits tenderness, bony tenderness, swelling (mild) and laceration (abrasion/puncture wound, not actively bleeding). She exhibits no deformity. Arms:       Right hand: She exhibits no tenderness, no bony tenderness, normal capillary refill, no deformity and no laceration. Normal sensation noted. Normal strength noted. Left hand: She exhibits no tenderness, no bony tenderness, normal capillary refill, no deformity and no laceration. Normal sensation noted. Normal strength noted. Skin:     General: Skin is dry. Capillary Refill: Capillary refill takes less than 2 seconds. Neurological:      General: No focal deficit present. Mental Status: She is alert. Psychiatric:         Mood and Affect: Mood normal.         Behavior: Behavior normal.       DIAGNOSTIC RESULTS     RADIOLOGY:   Interpretation per Radiologist below, if available at the time of this note:  XR RADIUS ULNA LEFT (2 VIEWS)   Final Result   Negative for fracture or radiopaque foreign body           All other labs were within normal range or not returned as of this dictation.     EMERGENCY DEPARTMENT COURSE and DIFFERENTIAL DIAGNOSIS/MDM:   Patient was given the following medications:  Orders Placed This Encounter   Medications On reassessment she had no new symptoms. We discussed following up with primary care and with wound care. She will be started on antibiotics prophylactically given the deep puncture wound and her age with concern for delay in healing. Discussed wound care instructions, return precautions, and prior to discharge both patient and daughter verbalized understanding of these instructions as well as importance of wound care follow up. CRITICAL CARE TIME   Due to the immediate potential for life-threatening deterioration due to dog bite with need for wound cleaning and antibiotics, I spent 22 minutes providing critical care. There was a high probability of clinically significant/life threatening deterioration in the patient's condition which required my urgent intervention. This time excludes time spent performing procedures but includes time spent on direct patient care, history retrieval, review of the chart, and discussions with patient, family, and consultant(s). FINAL IMPRESSION      1.  Dog bite, initial encounter        DISPOSITION/PLAN   DISPOSITION Decision To Discharge 01/10/2021 09:25:19 PM      PATIENT REFERRED TO:  Kalyani Zazueta MD  [address] [place] [address]  [phone]    Schedule an appointment as soon as possible for a visit   For follow up appointment    [address] [facility]  Schedule an appointment as soon as possible for a visit   For follow up appointment      DISCHARGE MEDICATIONS:  New Prescriptions    AMOXICILLIN-CLAVULANATE (AUGMENTIN) 875-125 MG PER TABLET    Take 1 tablet by mouth 2 times daily for 5 days            (Please note that portions of this note were completed with a voice recognition program. Efforts were made to edit the dictations but occasionally words are mis-transcribed.)    Federica Elam MD (electronically signed)  Attending Emergency Physician Navin Chan MD  01/10/21 3569

## 2021-01-12 ENCOUNTER — TELEPHONE (OUTPATIENT)
Dept: FAMILY MEDICINE CLINIC | Age: 86
End: 2021-01-12

## 2021-01-12 NOTE — TELEPHONE ENCOUNTER
----- Message from Rayna Saravia sent at 1/12/2021 10:14 AM EST -----  Subject: Appointment Request    Reason for Call: Routine ED Follow Up Visit    QUESTIONS  Type of Appointment? Established Patient  Reason for appointment request? Available appointments did not meet   patient need  Additional Information for Provider? Patient is needing an ED follow up   sooner than the 22nd. Was in the ED for a dog bite.   ---------------------------------------------------------------------------  --------------  CALL BACK INFO  What is the best way for the office to contact you? OK to leave message on   voicemail  Preferred Call Back Phone Number? [phone]  ---------------------------------------------------------------------------  --------------  SCRIPT ANSWERS  Relationship to Patient? Other  Representative Name? [employer]  Additional information verified (besides Name and Date of Birth)? Phone   Number  Appointment reason? Well Care/Follow Ups  Select a Well Care/Follow Ups appointment reason? Adult ED Follow Up   [Emergency Room   Emergency Department]  (Patient requests to see provider urgently. )? No  Do you have any questions for your primary care provider that need to be   answered prior to your appointment? No  Have you been diagnosed with   tested for   or told that you are suspected of having COVID-19 (Coronavirus)? No  Have you had a fever or taken medication to treat a fever within the past   3 days? No  Have you had a cough   shortness of breath or flu-like symptoms within the past 3 days? No  Do you currently have flu-like symptoms including fever or chills   cough   shortness of breath   or difficulty breathing   or new loss of taste or smell? No  (Service Expert  click yes below to proceed with [facility] As Usual   Scheduling)?  Yes

## 2021-01-13 ENCOUNTER — OFFICE VISIT (OUTPATIENT)
Dept: FAMILY MEDICINE CLINIC | Age: 86
End: 2021-01-13
Payer: MEDICARE

## 2021-01-13 VITALS
SYSTOLIC BLOOD PRESSURE: 124 MMHG | OXYGEN SATURATION: 98 % | HEIGHT: 60 IN | HEART RATE: 64 BPM | TEMPERATURE: 97 F | BODY MASS INDEX: 24.15 KG/M2 | WEIGHT: 123 LBS | DIASTOLIC BLOOD PRESSURE: 72 MMHG

## 2021-01-13 DIAGNOSIS — S51.852D: Primary | ICD-10-CM
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
## 2021-01-13 PROCEDURE — G8482 FLU IMMUNIZE ORDER/ADMIN: HCPCS | Performed by: FAMILY MEDICINE

## 2021-01-13 PROCEDURE — 1036F TOBACCO NON-USER: CPT | Performed by: FAMILY MEDICINE

## 2021-01-13 PROCEDURE — G8420 CALC BMI NORM PARAMETERS: HCPCS | Performed by: FAMILY MEDICINE

## 2021-01-13 PROCEDURE — 99213 OFFICE O/P EST LOW 20 MIN: CPT | Performed by: FAMILY MEDICINE

## 2021-01-13 PROCEDURE — 1123F ACP DISCUSS/DSCN MKR DOCD: CPT | Performed by: FAMILY MEDICINE

## 2021-01-13 PROCEDURE — 1090F PRES/ABSN URINE INCON ASSESS: CPT | Performed by: FAMILY MEDICINE

## 2021-01-13 PROCEDURE — 4040F PNEUMOC VAC/ADMIN/RCVD: CPT | Performed by: FAMILY MEDICINE

## 2021-01-13 PROCEDURE — G8427 DOCREV CUR MEDS BY ELIG CLIN: HCPCS | Performed by: FAMILY MEDICINE

## 2021-01-13 RX ORDER — DILTIAZEM HYDROCHLORIDE 240 MG/1
TABLET, EXTENDED RELEASE ORAL
Qty: 30 TABLET | Refills: 0 | Status: SHIPPED | OUTPATIENT
Start: 2021-01-13 | End: 2021-02-08

## 2021-01-13 RX ORDER — FAMOTIDINE 20 MG/1
20 TABLET, FILM COATED ORAL 2 TIMES DAILY
Qty: 60 TABLET | Refills: 11 | Status: SHIPPED | OUTPATIENT
Start: 2021-01-13 | End: 2022-06-20 | Stop reason: SDUPTHER

## 2021-01-13 ASSESSMENT — PATIENT HEALTH QUESTIONNAIRE - PHQ9
SUM OF ALL RESPONSES TO PHQ QUESTIONS 1-9: 0
SUM OF ALL RESPONSES TO PHQ9 QUESTIONS 1 & 2: 0
SUM OF ALL RESPONSES TO PHQ QUESTIONS 1-9: 0

## 2021-01-13 NOTE — TELEPHONE ENCOUNTER
Medication and Quantity requested: famotidine (PEPCID) 20 MG tablet     Quantity 60     Last Visit  1/13/21    Pharmacy and phone number updated in [employer]:  Yes [employer]

## 2021-01-13 NOTE — PROGRESS NOTES
Waldemar Wakefield is a 80 y.o. female. HPI:  Here for ER followup. Was seen on 1/10/21 after dog bite to L forearm. Xray was negative for fracture or foreign body. She was started on antibiotics which she has been taking. Wound is very painful. No drainage or discharge noted. No surrounding redness noted. No fever/chills. ROS:  Gen:  Denies fever, chills, headaches. HEENT:  Denies cold symptoms, sore throat. CV:  Denies chest pain or tightness, palpitations. Pulm:  Denies shortness of breath, cough. Abd:  Denies abdominal pain, change in bowel habits. I have reviewed the patient's medical/surgical/family/social in detail and updated the computerized patient record as appropriate. Current Outpatient Medications   Medication Sig Dispense Refill    amoxicillin-clavulanate (AUGMENTIN) 875-125 MG per tablet Take 1 tablet by mouth 2 times daily for 5 days 10 tablet 0    dilTIAZem HCl ER Coated Beads (CARDIZEM LA) 240 MG TB24 Take 1 tablet by mouth daily 30 capsule 0    furosemide (LASIX) 40 MG tablet Take 1 tablet by mouth as needed (edema) 60 tablet 0    XARELTO 15 MG TABS tablet TAKE 1 TABLET BY MOUTH EVERY DAY 90 tablet 1    polyethylene glycol (GLYCOLAX) 17 g packet Take 17 g by mouth daily as needed for Constipation      Blood Pressure KIT 1 each by Does not apply route once a week 1 kit 0    famotidine (PEPCID) 20 MG tablet Take 1 tablet by mouth 2 times daily 60 tablet 11    lovastatin (MEVACOR) 40 MG tablet Take 40 mg by mouth nightly. No current facility-administered medications for this visit.         Past Medical History:   Diagnosis Date    Atrial fibrillation ([address]     History of skin cancer     Hyperlipidemia     Hypertension      Past Surgical History:   Procedure Laterality Date    COLONOSCOPY N/A 9/28/2020    COLONOSCOPY POLYPECTOMY SNARE/COLD BIOPSY performed by Fernanda Potter MD at [facility]  9/28/2020    COLONOSCOPY SUBMUCOSAL/BOTOX INJECTION performed by Jaleel Reyonlds MD at [address] [place] [address]     Family History   Problem Relation Age of Onset    Heart Disease Neg Hx      Social History     Socioeconomic History    Marital status:      Spouse name: Not on file    Number of children: Not on file    Years of education: Not on file    Highest education level: Not on file   Occupational History    Not on file   Social Needs    Financial resource strain: Not on file    Food insecurity     Worry: Not on file     Inability: Not on file    Transportation needs     Medical: Not on file     Non-medical: Not on file   Tobacco Use    Smoking status: Never Smoker    Smokeless tobacco: Never Used   Substance and Sexual Activity    Alcohol use: No    Drug use: Never    Sexual activity: Not on file   Lifestyle    Physical activity     Days per week: Not on file     Minutes per session: Not on file    Stress: Not on file   Relationships    Social connections     Talks on phone: Not on file     Gets together: Not on file     Attends Mandaeism service: Not on file     Active member of club or organization: Not on file     Attends meetings of clubs or organizations: Not on file     Relationship status: Not on file    Intimate partner violence     Fear of current or ex partner: Not on file     Emotionally abused: Not on file     Physically abused: Not on file     Forced sexual activity: Not on file   Other Topics Concern    Not on file   Social History Narrative    Not on file         OBJECTIVE:  /72 (Site: Right Upper Arm, Position: Sitting, Cuff Size: Medium Adult)   Pulse 64   Temp 97 °F (36.1 °C)   Ht 5' (1.524 m)   Wt 123 lb (55.8 kg)   SpO2 98%   BMI 24.02 kg/m²   GEN:  WDWN, NAD  HEENT:  NCAT, PERRL, EOMI. DERM: 2 x 2 cm cm skin tear L posterior forearm and 1 x 3cm skin tear L posterior forearm. No drainage from wound. No surrounding erythema or edema. No warmth.  Significant pain w dressing change noted  PSYCH: normal mood and affect. Intact judgement and insight  NEURO: A&O x 3    ASSESSMENT/PLAN:  1.  Dog bite of forearm without complication, left, subsequent encounter  Wound cleaned and dressed today  Wound care instructions given  Continue PO abx  Tylenol PRN pain  F/u w PCP in 2 days for wound check

## 2021-01-15 ENCOUNTER — OFFICE VISIT (OUTPATIENT)
Dept: FAMILY MEDICINE CLINIC | Age: 86
End: 2021-01-15
Payer: MEDICARE

## 2021-01-15 VITALS — DIASTOLIC BLOOD PRESSURE: 72 MMHG | SYSTOLIC BLOOD PRESSURE: 118 MMHG | OXYGEN SATURATION: 96 % | HEART RATE: 100 BPM

## 2021-01-15 DIAGNOSIS — N17.9 AKI (ACUTE KIDNEY INJURY) (HCC): ICD-10-CM

## 2021-01-15 DIAGNOSIS — D62 ACUTE BLOOD LOSS ANEMIA: ICD-10-CM

## 2021-01-15 DIAGNOSIS — I48.20 CHRONIC ATRIAL FIBRILLATION (HCC): ICD-10-CM

## 2021-01-15 DIAGNOSIS — Z91.81 AT HIGH RISK FOR FALLS: ICD-10-CM

## 2021-01-15 DIAGNOSIS — L98.9 SKIN LESION: ICD-10-CM
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
## 2021-01-15 LAB
ANION GAP SERPL CALCULATED.3IONS-SCNC: 13 MMOL/L (ref 3–16)
BASOPHILS ABSOLUTE: 0 K/UL (ref 0–0.2)
BASOPHILS RELATIVE PERCENT: 0.4 %
BUN BLDV-MCNC: 24 MG/DL (ref 7–20)
CALCIUM SERPL-MCNC: 9.9 MG/DL (ref 8.3–10.6)
CHLORIDE BLD-SCNC: 97 MMOL/L (ref 99–110)
CO2: 27 MMOL/L (ref 21–32)
CREAT SERPL-MCNC: 1.4 MG/DL (ref 0.6–1.2)
EOSINOPHILS ABSOLUTE: 0.1 K/UL (ref 0–0.6)
EOSINOPHILS RELATIVE PERCENT: 0.8 %
GFR AFRICAN AMERICAN: 42
GFR NON-AFRICAN AMERICAN: 35
GLUCOSE BLD-MCNC: 93 MG/DL (ref 70–99)
HCT VFR BLD CALC: 37.8 % (ref 36–48)
HEMOGLOBIN: 12.6 G/DL (ref 12–16)
LYMPHOCYTES ABSOLUTE: 1.1 K/UL (ref 1–5.1)
LYMPHOCYTES RELATIVE PERCENT: 11.8 %
MCH RBC QN AUTO: 29.9 PG (ref 26–34)
MCHC RBC AUTO-ENTMCNC: 33.4 G/DL (ref 31–36)
MCV RBC AUTO: 89.6 FL (ref 80–100)
MONOCYTES ABSOLUTE: 0.9 K/UL (ref 0–1.3)
MONOCYTES RELATIVE PERCENT: 9.1 %
NEUTROPHILS ABSOLUTE: 7.4 K/UL (ref 1.7–7.7)
NEUTROPHILS RELATIVE PERCENT: 77.9 %
PDW BLD-RTO: 15.9 % (ref 12.4–15.4)
PLATELET # BLD: 227 K/UL (ref 135–450)
PMV BLD AUTO: 9.1 FL (ref 5–10.5)
POTASSIUM SERPL-SCNC: 4.8 MMOL/L (ref 3.5–5.1)
RBC # BLD: 4.22 M/UL (ref 4–5.2)
SODIUM BLD-SCNC: 137 MMOL/L (ref 136–145)
WBC # BLD: 9.6 K/UL (ref 4–11)

## 2021-01-15 PROCEDURE — 4040F PNEUMOC VAC/ADMIN/RCVD: CPT | Performed by: FAMILY MEDICINE

## 2021-01-15 PROCEDURE — 1123F ACP DISCUSS/DSCN MKR DOCD: CPT | Performed by: FAMILY MEDICINE

## 2021-01-15 PROCEDURE — 1090F PRES/ABSN URINE INCON ASSESS: CPT | Performed by: FAMILY MEDICINE

## 2021-01-15 PROCEDURE — G8420 CALC BMI NORM PARAMETERS: HCPCS | Performed by: FAMILY MEDICINE

## 2021-01-15 PROCEDURE — G8427 DOCREV CUR MEDS BY ELIG CLIN: HCPCS | Performed by: FAMILY MEDICINE

## 2021-01-15 PROCEDURE — 1036F TOBACCO NON-USER: CPT | Performed by: FAMILY MEDICINE

## 2021-01-15 PROCEDURE — 99214 OFFICE O/P EST MOD 30 MIN: CPT | Performed by: FAMILY MEDICINE

## 2021-01-15 PROCEDURE — G8482 FLU IMMUNIZE ORDER/ADMIN: HCPCS | Performed by: FAMILY MEDICINE

## 2021-01-15 NOTE — PROGRESS NOTES
meetings of clubs or organizations: Not on file     Relationship status: Not on file    Intimate partner violence     Fear of current or ex partner: Not on file     Emotionally abused: Not on file     Physically abused: Not on file     Forced sexual activity: Not on file   Other Topics Concern    Not on file   Social History Narrative    Not on file         Review of Systems  As documented in the HPI. Currently no complaints of CP or CIERA. Physical Exam  Constitutional:       General: She is not in acute distress. Appearance: She is well-developed. HENT:      Head: Normocephalic and atraumatic. Cardiovascular:      Rate and Rhythm: Normal rate and regular rhythm. Heart sounds: Normal heart sounds. No murmur. Pulmonary:      Effort: Pulmonary effort is normal. No respiratory distress. Breath sounds: Normal breath sounds. Skin:     General: Skin is warm and dry. Comments: R arm:  Lesion seems stable. Still open superficially. Does not appear infected. Neurological:      Mental Status: She is alert. Psychiatric:         Behavior: Behavior normal.           Assessment/Plan     1. Dog bite, subsequent encounter  Stable. Complete Augmentin. Schedule with wound care as healing slowly- if not able to get in in the next week let me know. Counseled on wound care. Return precautions reviewed. 2. Chronic atrial fibrillation (HCC)  Stable. Continue current regimen. On xarelto and diltiazem. 3. At high risk for falls  Fall precautions. Discussed avoiding contact with dog as well. 4. CHACHA (acute kidney injury) (HCC)  Stable. Continue current regimen. Recheck. - Basic Metabolic Panel; Future    5. Acute blood loss anemia  Stable. Continue current regimen. Recheck. - CBC Auto Differential; Future    6. Skin lesion  Stable. Patient to get in with derm given a couple of possible cancerous skin lesions.         Discussed medications with patient, who voiced understanding of their use and indications. All questions answered. Return in about 3 months (around 4/15/2021) for Chronic conditions. Documentation was done using voice recognition dragon software. Efforts were made to ensure accuracy; however, inadvertent, unintentional computerized transcription errors may be present. --Nehemias Pal MD on 1/15/2021    An electronic signature was used to authenticate this note. On the basis of positive falls risk screening, assessment and plan is as follows: home safety tips provided.

## 2021-01-15 NOTE — PATIENT INSTRUCTIONS
Patients in 1b may call to schedule for a COVID vaccine via the following phone number: 1- [phone]    The phone line will be live for scheduling 8am-5pm Monday-Friday   Patients may also call this number with questions or if they have an adverse reaction to a vaccine. Week Date Population   1 1/19/21 (or when we receive vaccine)  ? 79yo   2 1/25/21  ? 77yo   Adults with severe congenital, developmental, or early onset medical disorders    3 2/1/21  ? 69yo   Employees of [address] [place] [address] that wish to remain or return to in-person or hybrid learning by March 1 4 2/8/21  ?  64yo       The following locations will offer COVID Vaccine for 1b patients (Scheduled appointment only):  [address] [place] [address] - effective January 20th - 10a-6p M-F  [facility] Conference room A/B- effective January 20th - 1230p-430p M-F  [facility] CVU - effective January 19th - 1p-4p M-F   [facility] first floor - effective January 20th - 1p-4p M-F  [facility] [address] - effective January 21st - 10a-3p M-F

## 2021-01-18 DIAGNOSIS — N18.32 STAGE 3B CHRONIC KIDNEY DISEASE (HCC): Primary | ICD-10-CM

## 2021-01-21 ENCOUNTER — HOSPITAL ENCOUNTER (OUTPATIENT)
Dept: WOUND CARE | Age: 86
Discharge: HOME | End: 2021-01-21
Payer: MEDICARE

## 2021-01-21 VITALS
HEART RATE: 75 BPM | TEMPERATURE: 97.3 F | RESPIRATION RATE: 16 BRPM | DIASTOLIC BLOOD PRESSURE: 72 MMHG | SYSTOLIC BLOOD PRESSURE: 132 MMHG

## 2021-01-21 DIAGNOSIS — S41.152A DOG BITE OF LEFT UPPER EXTREMITY, INITIAL ENCOUNTER: Primary | ICD-10-CM
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
## 2021-01-21 PROCEDURE — 11042 DBRDMT SUBQ TIS 1ST 20SQCM/<: CPT | Performed by: INTERNAL MEDICINE

## 2021-01-21 PROCEDURE — 11042 DBRDMT SUBQ TIS 1ST 20SQCM/<: CPT

## 2021-01-21 PROCEDURE — 11045 DBRDMT SUBQ TISS EACH ADDL: CPT

## 2021-01-21 PROCEDURE — 11045 DBRDMT SUBQ TISS EACH ADDL: CPT | Performed by: INTERNAL MEDICINE

## 2021-01-21 PROCEDURE — 99213 OFFICE O/P EST LOW 20 MIN: CPT

## 2021-01-21 PROCEDURE — 99203 OFFICE O/P NEW LOW 30 MIN: CPT | Performed by: INTERNAL MEDICINE

## 2021-01-21 RX ORDER — LIDOCAINE 40 MG/G
CREAM TOPICAL ONCE
Status: DISCONTINUED | OUTPATIENT
Start: 2021-01-21 | End: 2021-01-22 | Stop reason: HOSPADM

## 2021-01-21 RX ORDER — GINSENG 100 MG
CAPSULE ORAL ONCE
Status: CANCELLED | OUTPATIENT
Start: 2021-01-21 | End: 2021-01-21

## 2021-01-21 RX ORDER — CLOBETASOL PROPIONATE 0.5 MG/G
OINTMENT TOPICAL ONCE
Status: CANCELLED | OUTPATIENT
Start: 2021-01-21 | End: 2021-01-21

## 2021-01-21 RX ORDER — GENTAMICIN SULFATE 1 MG/G
OINTMENT TOPICAL ONCE
Status: CANCELLED | OUTPATIENT
Start: 2021-01-21 | End: 2021-01-21

## 2021-01-21 RX ORDER — BACITRACIN ZINC AND POLYMYXIN B SULFATE 500; 1000 [USP'U]/G; [USP'U]/G
OINTMENT TOPICAL ONCE
Status: CANCELLED | OUTPATIENT
Start: 2021-01-21 | End: 2021-01-21

## 2021-01-21 RX ORDER — LIDOCAINE HYDROCHLORIDE 20 MG/ML
JELLY TOPICAL ONCE
Status: CANCELLED | OUTPATIENT
Start: 2021-01-21 | End: 2021-01-21

## 2021-01-21 RX ORDER — LIDOCAINE HYDROCHLORIDE 40 MG/ML
SOLUTION TOPICAL ONCE
Status: CANCELLED | OUTPATIENT
Start: 2021-01-21 | End: 2021-01-21

## 2021-01-21 RX ORDER — BACITRACIN, NEOMYCIN, POLYMYXIN B 400; 3.5; 5 [USP'U]/G; MG/G; [USP'U]/G
OINTMENT TOPICAL ONCE
Status: CANCELLED | OUTPATIENT
Start: 2021-01-21 | End: 2021-01-21

## 2021-01-21 RX ORDER — LIDOCAINE 40 MG/G
CREAM TOPICAL ONCE
Status: CANCELLED | OUTPATIENT
Start: 2021-01-21 | End: 2021-01-21

## 2021-01-21 RX ORDER — LIDOCAINE 50 MG/G
OINTMENT TOPICAL ONCE
Status: CANCELLED | OUTPATIENT
Start: 2021-01-21 | End: 2021-01-21

## 2021-01-21 RX ORDER — BETAMETHASONE DIPROPIONATE 0.05 %
OINTMENT (GRAM) TOPICAL ONCE
Status: CANCELLED | OUTPATIENT
Start: 2021-01-21 | End: 2021-01-21

## 2021-01-21 NOTE — PROGRESS NOTES
[facility]:      [address] f: [phone] f: [phone] p: [phone] [email or web address]     Ordering Center: Carmen Thacker [address]  [facility] [address]  [phone]  Dept: [phone]   Fax# [phone]    Patient Information:      Catia Yadav  [address] [address]   [phone]   : 4/10/1928  AGE: 80 y.o. GENDER: female   TODAYS DATE:  2021    Insurance:      PRIMARY INSURANCE:  Plan: MEDICARE PART A AND B  Coverage: MEDICARE  Effective Date: 2021  Group Number: [unfilled]  Subscriber Number: [id number] - (Medicare)    Payor/Plan Subscr  Sex Relation Sub. Ins. ID Effective Group Num   1. MEDICARE - MEClabe Lakesha 4/10/1928 Female Self [id number] 21                                    [address] [address]   2.  [address] 4/10/1928 Female Self [id number]                                   [address]         Patient Wound Information:     Additional ICD-10 Codes: Q89.933Q    Patient Active Problem List   Diagnosis Code    Chronic atrial fibrillation I48.20    Essential hypertension I10    Mixed hyperlipidemia E78.2    Ankle swelling M25.473    Chronic back pain M54.9, G89.29    Rectal bleeding K62.5    Acute blood loss anemia D62    CHACHA (acute kidney injury) ([facility] N17.9       WOUNDS REQUIRING DRESSING SUPPLIES:     Wound 21 Arm Left #1 (Active)   Wound Image   21 0844   Wound Etiology Traumatic 21 0844   Wound Cleansed Cleansed with saline 21 0937   Wound Length (cm) 2.8 cm 21 0844   Wound Width (cm) 8 cm 21 0844   Wound Depth (cm) 0.1 cm 21 0844   Wound Surface Area (cm^2) 22.4 cm^2 21 0844   Wound Volume (cm^3) 2.24 cm^3 21 0844   Post-Procedure Length (cm) 2.8 cm 21 0937   Post-Procedure Width (cm) 8 cm 21 0937   Post-Procedure Depth (cm) 0.1 cm 21 0849

## 2021-01-21 NOTE — H&P
[facility]   Progress Note and Procedure Note      Leola Mackenzie  AGE: 80 y.o. GENDER: female  : 4/10/1928  TODAY'S DATE:  2021    Subjective:     Chief Complaint   Patient presents with    Wound Check     Left lower arm,dog bite on 1/10/2021         HISTORY of PRESENT ILLNESS HPI     Leola Mackenzie is a 80 y.o. female who presents today for wound evaluation. History of Wound:   Went to the emergency room January 10 following a dog bite from her [facility]. States that this is the first bite and was at the left forearm. States that the right forearm laceration was felt to be in reaction to moving backwards. Completed oral antibiotic. No fevers chills. Granddaughter at bedside. On Xarelto for history of atrial fibrillation. Non-smoker.       Wound Pain:   intermittent  Severity:  4 / 10   Wound Type:  traumatic  Modifying Factors:  anticoagulation therapy  Associated Signs/Symptoms:  drainage and pain        PAST MEDICAL HISTORY        Diagnosis Date    Atrial fibrillation (HCC)     History of skin cancer     Hyperlipidemia     Hypertension        PAST SURGICAL HISTORY    Past Surgical History:   Procedure Laterality Date    COLONOSCOPY N/A 2020    COLONOSCOPY POLYPECTOMY SNARE/COLD BIOPSY performed by Omar Watkins MD at [address] [facility]  2020    COLONOSCOPY SUBMUCOSAL/BOTOX INJECTION performed by Omar Watkins MD at [address]    Family History   Problem Relation Age of Onset    Heart Disease Neg Hx        SOCIAL HISTORY    Social History     Tobacco Use    Smoking status: Never Smoker    Smokeless tobacco: Never Used   Substance Use Topics    Alcohol use: No    Drug use: Never       ALLERGIES    Allergies   Allergen Reactions    Aleve [Naproxen]        MEDICATIONS    Current Outpatient Medications on File Prior to Encounter   Medication Sig Dispense Refill  CARDIZEM  MG TB24 TAKE 1 TABLET BY MOUTH EVERY DAY 30 tablet 0    famotidine (PEPCID) 20 MG tablet Take 1 tablet by mouth 2 times daily 60 tablet 11    furosemide (LASIX) 40 MG tablet Take 1 tablet by mouth as needed (edema) 60 tablet 0    XARELTO 15 MG TABS tablet TAKE 1 TABLET BY MOUTH EVERY DAY 90 tablet 1    polyethylene glycol (GLYCOLAX) 17 g packet Take 17 g by mouth daily as needed for Constipation      Blood Pressure KIT 1 each by Does not apply route once a week 1 kit 0    lovastatin (MEVACOR) 40 MG tablet Take 40 mg by mouth nightly. No current facility-administered medications on file prior to encounter. REVIEW OF SYSTEMS    Pertinent items are noted in HPI. Objective:      /72   Pulse 75   Temp 97.3 °F (36.3 °C) (Infrared)   Resp 16     PHYSICAL EXAM    General Appearance: alert and oriented to person, place and time, well-developed and well-nourished, in no acute distress  Skin: warm and dry  Head: normocephalic and atraumatic  Eyes: extraocular eye movements intact and conjunctivae normal  Extremities: no cyanosis and no clubbing  Musculoskeletal: normal range of motion, no joint swelling, deformity or tenderness      Assessment:     Patient Active Problem List   Diagnosis    Chronic atrial fibrillation    Essential hypertension    Mixed hyperlipidemia    Ankle swelling    Chronic back pain    Rectal bleeding    Acute blood loss anemia    CHACHA (acute kidney injury) ([facility]       Procedure Note    Performed by: Sari Waddell DO    Consent obtained: Yes    Time out taken:  Yes    Pain Control: Anesthetic  Anesthetic: 4% Lidocaine Cream     Debridement:Excisional Debridement    Using curette the wound was sharply debrided    down through and including the removal of epidermis, dermis and subcutaneous tissue.         Devitalized Tissue Debrided:  biofilm and slough    Pre Debridement Measurements:  Are located in the Wound Documentation Flow Sheet Wound #: 1     Post  Debridement Measurements:  Wound 01/21/21 Arm Left #1 (Active)   Wound Image   01/21/21 0844   Wound Etiology Traumatic 01/21/21 0844   Wound Cleansed Cleansed with saline 01/21/21 0937   Wound Length (cm) 2.8 cm 01/21/21 0844   Wound Width (cm) 8 cm 01/21/21 0844   Wound Depth (cm) 0.1 cm 01/21/21 0844   Wound Surface Area (cm^2) 22.4 cm^2 01/21/21 0844   Wound Volume (cm^3) 2.24 cm^3 01/21/21 0844   Post-Procedure Length (cm) 2.8 cm 01/21/21 0937   Post-Procedure Width (cm) 8 cm 01/21/21 7111   Post-Procedure Depth (cm) 0.1 cm 01/21/21 0937   Post-Procedure Surface Area (cm^2) 22.4 cm^2 01/21/21 0937   Post-Procedure Volume (cm^3) 2.24 cm^3 01/21/21 0937   Wound Assessment Bleeding 01/21/21 0937   Drainage Amount Moderate 01/21/21 0937   Drainage Description Serosanguinous 01/21/21 0844   Odor None 01/21/21 0844   Samara-wound Assessment Blisters;Fragile 01/21/21 0844   Margins Attached edges 01/21/21 0844   Wound Thickness Description not for Pressure Injury Full thickness 01/21/21 0844   Number of days: 0           Total Surface Area Debrided:  22.4 sq cm     Percentage of wound debrided 100%    Bleeding:  Minimal    Hemostasis Achieved:  by pressure    Procedural Pain:  4  / 10     Post Procedural Pain:  2 / 10     Response to treatment:  Well tolerated by patient. Plan:     The nature of the patient's condition was explained in depth. The patient was informed that their compliance to the treatment plan is paramount to successful healing and prevention of further ulceration and/or infection     Discharge Treatment      Written Patient Discharge Instructions Given  Wound: Left arm      With each dressing change, rinse wounds with 0.9% Saline. (May use wound wash or soft contact solution. Both can be purchased at a local drug store).  If unable to obtain saline, may use a gentle soap and water.     Dressing care: Cutimed, dry dressing, 1 layer tubi - change 2 days.    Important dietary reminders:  1. Increase Protein intake for optimal wound healing  2. No added salt to reduce any swelling  3. If diabetic, good glucose control  4. If you smoke, smoking affects wound healing, we ask that you refrain from smoking.     Follow up with Dr Amparo Lowry In 2 weeks in the wound care center. Thank you for allowing me to participate in the care of your patient. Please do not hesitate to call.      Maribel Sweeney D.O., [facility]  Interventional Cardiology     o: [phone]  [address] [place] [address]., [address], [address] [place] [address]

## 2021-01-21 NOTE — PLAN OF CARE
Discharge instructions given. Patient verbalized understanding. Return to [facility] in 2 weeks.   Called/faxed orders to [facility]

## 2021-01-22 ENCOUNTER — IMMUNIZATION (OUTPATIENT)
Dept: PRIMARY CARE CLINIC | Age: 86
End: 2021-01-22
Payer: MEDICARE

## 2021-01-22 PROCEDURE — 91300 COVID-19, PFIZER VACCINE 30MCG/0.3ML DOSE: CPT | Performed by: FAMILY MEDICINE

## 2021-01-22 PROCEDURE — 0001A COVID-19, PFIZER VACCINE 30MCG/0.3ML DOSE: CPT | Performed by: FAMILY MEDICINE

## 2021-02-04 ENCOUNTER — HOSPITAL ENCOUNTER (OUTPATIENT)
Dept: WOUND CARE | Age: 86
Discharge: HOME | End: 2021-02-04
Payer: MEDICARE

## 2021-02-04 VITALS
DIASTOLIC BLOOD PRESSURE: 83 MMHG | SYSTOLIC BLOOD PRESSURE: 135 MMHG | TEMPERATURE: 97.5 F | WEIGHT: 123 LBS | BODY MASS INDEX: 24.02 KG/M2 | HEART RATE: 75 BPM

## 2021-02-04 DIAGNOSIS — S41.152S DOG BITE OF LEFT UPPER EXTREMITY, SEQUELA: Primary | ICD-10-CM

## 2021-02-04 DIAGNOSIS — S41.152A DOG BITE OF LEFT UPPER EXTREMITY, INITIAL ENCOUNTER: ICD-10-CM
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
## 2021-02-04 PROCEDURE — 11042 DBRDMT SUBQ TIS 1ST 20SQCM/<: CPT

## 2021-02-04 PROCEDURE — 11042 DBRDMT SUBQ TIS 1ST 20SQCM/<: CPT | Performed by: INTERNAL MEDICINE

## 2021-02-04 RX ORDER — LIDOCAINE HYDROCHLORIDE 20 MG/ML
JELLY TOPICAL ONCE
Status: CANCELLED | OUTPATIENT
Start: 2021-02-04 | End: 2021-02-04

## 2021-02-04 RX ORDER — BETAMETHASONE DIPROPIONATE 0.05 %
OINTMENT (GRAM) TOPICAL ONCE
Status: CANCELLED | OUTPATIENT
Start: 2021-02-04 | End: 2021-02-04

## 2021-02-04 RX ORDER — GENTAMICIN SULFATE 1 MG/G
OINTMENT TOPICAL ONCE
Status: CANCELLED | OUTPATIENT
Start: 2021-02-04 | End: 2021-02-04

## 2021-02-04 RX ORDER — LIDOCAINE 40 MG/G
CREAM TOPICAL ONCE
Status: CANCELLED | OUTPATIENT
Start: 2021-02-04 | End: 2021-02-04

## 2021-02-04 RX ORDER — GINSENG 100 MG
CAPSULE ORAL ONCE
Status: CANCELLED | OUTPATIENT
Start: 2021-02-04 | End: 2021-02-04

## 2021-02-04 RX ORDER — BACITRACIN ZINC AND POLYMYXIN B SULFATE 500; 1000 [USP'U]/G; [USP'U]/G
OINTMENT TOPICAL ONCE
Status: CANCELLED | OUTPATIENT
Start: 2021-02-04 | End: 2021-02-04

## 2021-02-04 RX ORDER — BACITRACIN, NEOMYCIN, POLYMYXIN B 400; 3.5; 5 [USP'U]/G; MG/G; [USP'U]/G
OINTMENT TOPICAL ONCE
Status: CANCELLED | OUTPATIENT
Start: 2021-02-04 | End: 2021-02-04

## 2021-02-04 RX ORDER — LIDOCAINE 50 MG/G
OINTMENT TOPICAL ONCE
Status: CANCELLED | OUTPATIENT
Start: 2021-02-04 | End: 2021-02-04

## 2021-02-04 RX ORDER — LIDOCAINE 40 MG/G
CREAM TOPICAL ONCE
Status: DISCONTINUED | OUTPATIENT
Start: 2021-02-04 | End: 2021-02-05 | Stop reason: HOSPADM

## 2021-02-04 RX ORDER — CLOBETASOL PROPIONATE 0.5 MG/G
OINTMENT TOPICAL ONCE
Status: CANCELLED | OUTPATIENT
Start: 2021-02-04 | End: 2021-02-04

## 2021-02-04 RX ORDER — LIDOCAINE HYDROCHLORIDE 40 MG/ML
SOLUTION TOPICAL ONCE
Status: CANCELLED | OUTPATIENT
Start: 2021-02-04 | End: 2021-02-04

## 2021-02-04 NOTE — PROGRESS NOTES
Wound #: 1     Post  Debridement Measurements:  Wound Care Documentation:  Wound 01/21/21 Arm Left #1 (Active)   Wound Image   01/21/21 0844   Wound Etiology Traumatic 02/04/21 0823   Wound Cleansed Cleansed with saline 02/04/21 0847   Wound Length (cm) 1 cm 02/04/21 0823   Wound Width (cm) 0.5 cm 02/04/21 0823   Wound Depth (cm) 0.1 cm 02/04/21 0823   Wound Surface Area (cm^2) 0.5 cm^2 02/04/21 0823   Change in Wound Size % (l*w) 97.77 02/04/21 0823   Wound Volume (cm^3) 0.05 cm^3 02/04/21 0823   Wound Healing % 98 02/04/21 0823   Post-Procedure Length (cm) 1 cm 02/04/21 0847   Post-Procedure Width (cm) 0.5 cm 02/04/21 0847   Post-Procedure Depth (cm) 0.1 cm 02/04/21 0847   Post-Procedure Surface Area (cm^2) 0.5 cm^2 02/04/21 0847   Post-Procedure Volume (cm^3) 0.05 cm^3 02/04/21 0847   Wound Assessment Bleeding 02/04/21 0847   Drainage Amount Moderate 02/04/21 0847   Drainage Description Serosanguinous 02/04/21 0823   Odor None 02/04/21 0823   Samara-wound Assessment Dry/flaky 02/04/21 0823   Margins Attached edges 02/04/21 0823   Wound Thickness Description not for Pressure Injury Full thickness 02/04/21 0823   Number of days: 14         Total Surface Area Debrided:  0.5 sq cm     Percentage of wound debrided 100%    Bleeding:  Minimal    Hemostasis Achieved:  by pressure    Procedural Pain: 2  / 10     Post Procedural Pain:  0/ 10     Response to treatment:  Well tolerated by patient. Plan:     The nature of the patient's condition was explained in depth.  The patient was informed that their compliance to the treatment plan is paramount to successful healing and prevention of further ulceration and/or infection     Discharge Treatment      Written Patient Discharge Instructions Given  Wound: Left arm     With each dressing change, rinse wounds with 0.9% Saline. (May use wound wash or soft contact solution. Both can be purchased at a local drug store). If unable to obtain saline, may use a gentle soap and water.     Dressing care: Cutimed, dry dressing, 1 layer tubi - change 2 days.     Important dietary reminders:  1. Increase Protein intake for optimal wound healing  2. No added salt to reduce any swelling  3. If diabetic, good glucose control  4. If you smoke, smoking affects wound healing, we ask that you refrain from smoking.     Follow up with Dr Matheus Jiang In 2 weeks in the wound care center. Thank you for allowing me to participate in the care of your patient. Please do not hesitate to call.      Rosey Aragon D.O., [facility]  Interventional Cardiology     o: [phone]  [address], [address], [address] [facility]

## 2021-02-08 RX ORDER — DILTIAZEM HYDROCHLORIDE 240 MG/1
TABLET, EXTENDED RELEASE ORAL
Qty: 30 TABLET | Refills: 0 | Status: SHIPPED | OUTPATIENT
Start: 2021-02-08 | End: 2021-03-08

## 2021-02-08 NOTE — TELEPHONE ENCOUNTER
Medication:   Requested Prescriptions     Pending Prescriptions Disp Refills    CARDIZEM  MG TB24 [Pharmacy Med Name: CARDIZEM  MG TABLET] 30 tablet 0     Sig: TAKE 1 TABLET BY MOUTH EVERY DAY        Last Filled:  1/13/21    Patient Phone Number: [phone] (home)     Last appt: 1/15/2021   Next appt: 3/19/2021    Last OARRS: No flowsheet data found.

## 2021-02-12 ENCOUNTER — IMMUNIZATION (OUTPATIENT)
Dept: PRIMARY CARE CLINIC | Age: 86
End: 2021-02-12
Payer: MEDICARE

## 2021-02-12 PROCEDURE — 0002A COVID-19, PFIZER VACCINE 30MCG/0.3ML DOSE: CPT | Performed by: FAMILY MEDICINE

## 2021-02-12 PROCEDURE — 91300 COVID-19, PFIZER VACCINE 30MCG/0.3ML DOSE: CPT | Performed by: FAMILY MEDICINE

## 2021-02-15 RX ORDER — LOVASTATIN 40 MG/1
TABLET ORAL
Qty: 90 TABLET | Refills: 3 | Status: SHIPPED | OUTPATIENT
Start: 2021-02-15 | End: 2022-02-04

## 2021-02-15 NOTE — TELEPHONE ENCOUNTER
Medication:   Requested Prescriptions     Pending Prescriptions Disp Refills    lovastatin (MEVACOR) 40 MG tablet [Pharmacy Med Name: LOVASTATIN 40 MG TABLET] 90 tablet 3     Sig: TAKE 1 TABLET BY MOUTH EVERYDAY AT BEDTIME        Last Filled:  Unknown dosage and fills     Patient Phone Number: [phone] (home)     Last appt: 1/15/2021   Next appt: 3/19/2021    Last OARRS: No flowsheet data found.

## 2021-03-08 RX ORDER — DILTIAZEM HYDROCHLORIDE 240 MG/1
TABLET, EXTENDED RELEASE ORAL
Qty: 30 TABLET | Refills: 0 | Status: SHIPPED | OUTPATIENT
Start: 2021-03-08 | End: 2021-03-31

## 2021-03-08 NOTE — TELEPHONE ENCOUNTER
Medication:   Requested Prescriptions     Pending Prescriptions Disp Refills    CARDIZEM  MG TB24 [Pharmacy Med Name: CARDIZEM  MG TABLET] 30 tablet 0     Sig: TAKE 1 TABLET BY MOUTH EVERY DAY        Last Filled:  2/8/21 #30 REFILLS 0    Patient Phone Number: [phone] (home)     Last appt: 1/15/2021   Next appt: 3/19/2021    Last OARRS: No flowsheet data found.

## 2021-03-31 RX ORDER — DILTIAZEM HYDROCHLORIDE 240 MG/1
TABLET, EXTENDED RELEASE ORAL
Qty: 30 TABLET | Refills: 0 | Status: SHIPPED | OUTPATIENT
Start: 2021-03-31 | End: 2021-04-05 | Stop reason: SDUPTHER

## 2021-03-31 NOTE — TELEPHONE ENCOUNTER
Medication:   Requested Prescriptions     Pending Prescriptions Disp Refills    CARDIZEM  MG TB24 [Pharmacy Med Name: CARDIZEM  MG TABLET] 30 tablet 0     Sig: TAKE 1 TABLET BY MOUTH EVERY DAY        Last Filled:  3/8/2021 30 tabs 0 refills     Patient Phone Number: [phone] (home)     Last appt: 1/15/2021   Next appt: Visit date not found    Last OARRS: No flowsheet data found.

## 2021-04-05 RX ORDER — DILTIAZEM HYDROCHLORIDE EXTENDED-RELEASE TABLETS 240 MG/1
TABLET, EXTENDED RELEASE ORAL
Qty: 90 TABLET | Refills: 0 | Status: SHIPPED | OUTPATIENT
Start: 2021-04-05 | End: 2021-05-07

## 2021-04-05 NOTE — TELEPHONE ENCOUNTER
Medication and Quantity requested:   CARDIZEM  MG TB24    QTY:90    Last Visit  1/15/21    Pharmacy and phone number updated in [facility]:  Yes  [address] ON 3/31/21, REQUESTING 90 DAY SUPPLY

## 2021-04-05 NOTE — TELEPHONE ENCOUNTER
Medication:   Requested Prescriptions     Pending Prescriptions Disp Refills    dilTIAZem HCl ER Coated Beads (CARDIZEM LA) 240 MG TB24 30 tablet 0     Sig: TAKE 1 TABLET BY MOUTH EVERY DAY        Last Filled:  3/31/21 30 tabs 0 rf. Pt requesting 90 day    Patient Phone Number: [phone] (home)     Last appt: 1/15/2021   Next appt: Visit date not found    Last OARRS: No flowsheet data found.

## 2021-05-07 ENCOUNTER — HOSPITAL ENCOUNTER (OUTPATIENT)
Dept: GENERAL RADIOLOGY | Age: 86
Discharge: HOME | End: 2021-05-07
Payer: MEDICARE

## 2021-05-07 ENCOUNTER — OFFICE VISIT (OUTPATIENT)
Dept: FAMILY MEDICINE CLINIC | Age: 86
End: 2021-05-07
Payer: MEDICARE

## 2021-05-07 VITALS
HEIGHT: 60 IN | WEIGHT: 116 LBS | HEART RATE: 60 BPM | SYSTOLIC BLOOD PRESSURE: 136 MMHG | DIASTOLIC BLOOD PRESSURE: 78 MMHG | OXYGEN SATURATION: 97 % | BODY MASS INDEX: 22.78 KG/M2

## 2021-05-07 DIAGNOSIS — I10 ESSENTIAL HYPERTENSION: Primary | ICD-10-CM

## 2021-05-07 DIAGNOSIS — E78.2 MIXED HYPERLIPIDEMIA: ICD-10-CM

## 2021-05-07 DIAGNOSIS — M54.50 ACUTE BILATERAL LOW BACK PAIN WITHOUT SCIATICA: ICD-10-CM

## 2021-05-07 DIAGNOSIS — I48.20 CHRONIC ATRIAL FIBRILLATION (HCC): ICD-10-CM

## 2021-05-07 DIAGNOSIS — R93.89 ABNORMAL X-RAY: Primary | ICD-10-CM

## 2021-05-07 DIAGNOSIS — Z86.2 HISTORY OF ANEMIA: ICD-10-CM

## 2021-05-07 DIAGNOSIS — G89.29 CHRONIC BACK PAIN, UNSPECIFIED BACK LOCATION, UNSPECIFIED BACK PAIN LATERALITY: ICD-10-CM

## 2021-05-07 DIAGNOSIS — M54.9 CHRONIC BACK PAIN, UNSPECIFIED BACK LOCATION, UNSPECIFIED BACK PAIN LATERALITY: ICD-10-CM

## 2021-05-07 LAB
A/G RATIO: 1.6 (ref 1.1–2.2)
ALBUMIN SERPL-MCNC: 4.6 G/DL (ref 3.4–5)
ALP BLD-CCNC: 114 U/L (ref 40–129)
ALT SERPL-CCNC: 16 U/L (ref 10–40)
ANION GAP SERPL CALCULATED.3IONS-SCNC: 14 MMOL/L (ref 3–16)
AST SERPL-CCNC: 31 U/L (ref 15–37)
BASOPHILS ABSOLUTE: 0.1 K/UL (ref 0–0.2)
BASOPHILS RELATIVE PERCENT: 0.7 %
BILIRUB SERPL-MCNC: 0.3 MG/DL (ref 0–1)
BUN BLDV-MCNC: 23 MG/DL (ref 7–20)
CALCIUM SERPL-MCNC: 10.1 MG/DL (ref 8.3–10.6)
CHLORIDE BLD-SCNC: 99 MMOL/L (ref 99–110)
CHOLESTEROL, TOTAL: 147 MG/DL (ref 0–199)
CO2: 27 MMOL/L (ref 21–32)
CREAT SERPL-MCNC: 1.1 MG/DL (ref 0.6–1.2)
EOSINOPHILS ABSOLUTE: 0.1 K/UL (ref 0–0.6)
EOSINOPHILS RELATIVE PERCENT: 0.9 %
GFR AFRICAN AMERICAN: 56
GFR NON-AFRICAN AMERICAN: 46
GLOBULIN: 2.8 G/DL
GLUCOSE BLD-MCNC: 100 MG/DL (ref 70–99)
HCT VFR BLD CALC: 40.7 % (ref 36–48)
HDLC SERPL-MCNC: 62 MG/DL (ref 40–60)
HEMOGLOBIN: 13.5 G/DL (ref 12–16)
LDL CHOLESTEROL CALCULATED: 66 MG/DL
LYMPHOCYTES ABSOLUTE: 1.6 K/UL (ref 1–5.1)
LYMPHOCYTES RELATIVE PERCENT: 17.4 %
MCH RBC QN AUTO: 30.7 PG (ref 26–34)
MCHC RBC AUTO-ENTMCNC: 33.1 G/DL (ref 31–36)
MCV RBC AUTO: 92.8 FL (ref 80–100)
MONOCYTES ABSOLUTE: 0.8 K/UL (ref 0–1.3)
MONOCYTES RELATIVE PERCENT: 8.6 %
NEUTROPHILS ABSOLUTE: 6.7 K/UL (ref 1.7–7.7)
NEUTROPHILS RELATIVE PERCENT: 72.4 %
PDW BLD-RTO: 15.1 % (ref 12.4–15.4)
PLATELET # BLD: 250 K/UL (ref 135–450)
PMV BLD AUTO: 9.2 FL (ref 5–10.5)
POTASSIUM SERPL-SCNC: 5.5 MMOL/L (ref 3.5–5.1)
RBC # BLD: 4.39 M/UL (ref 4–5.2)
SODIUM BLD-SCNC: 140 MMOL/L (ref 136–145)
TOTAL PROTEIN: 7.4 G/DL (ref 6.4–8.2)
TRIGL SERPL-MCNC: 93 MG/DL (ref 0–150)
VLDLC SERPL CALC-MCNC: 19 MG/DL
WBC # BLD: 9.3 K/UL (ref 4–11)

## 2021-05-07 PROCEDURE — 99214 OFFICE O/P EST MOD 30 MIN: CPT | Performed by: FAMILY MEDICINE

## 2021-05-07 PROCEDURE — 1036F TOBACCO NON-USER: CPT | Performed by: FAMILY MEDICINE

## 2021-05-07 PROCEDURE — 1090F PRES/ABSN URINE INCON ASSESS: CPT | Performed by: FAMILY MEDICINE

## 2021-05-07 PROCEDURE — G8420 CALC BMI NORM PARAMETERS: HCPCS | Performed by: FAMILY MEDICINE

## 2021-05-07 PROCEDURE — 72100 X-RAY EXAM L-S SPINE 2/3 VWS: CPT

## 2021-05-07 PROCEDURE — 1123F ACP DISCUSS/DSCN MKR DOCD: CPT | Performed by: FAMILY MEDICINE

## 2021-05-07 PROCEDURE — G8427 DOCREV CUR MEDS BY ELIG CLIN: HCPCS | Performed by: FAMILY MEDICINE

## 2021-05-07 PROCEDURE — 4040F PNEUMOC VAC/ADMIN/RCVD: CPT | Performed by: FAMILY MEDICINE

## 2021-05-07 RX ORDER — FUROSEMIDE 20 MG/1
20 TABLET ORAL PRN
Qty: 30 TABLET | Refills: 1 | Status: SHIPPED | OUTPATIENT
Start: 2021-05-07 | End: 2021-06-01

## 2021-05-07 RX ORDER — GABAPENTIN 100 MG/1
100 CAPSULE ORAL NIGHTLY PRN
Qty: 30 CAPSULE | Refills: 1 | Status: SHIPPED | OUTPATIENT
Start: 2021-05-07 | End: 2021-07-13 | Stop reason: SDUPTHER

## 2021-05-07 RX ORDER — DILTIAZEM HYDROCHLORIDE EXTENDED-RELEASE TABLETS 240 MG/1
TABLET, EXTENDED RELEASE ORAL
Qty: 30 TABLET | Refills: 3 | Status: SHIPPED | OUTPATIENT
Start: 2021-05-07 | End: 2021-07-09 | Stop reason: SDUPTHER

## 2021-05-07 NOTE — PROGRESS NOTES
Bayron Coello   YOB: 1928    Date of Visit:  5/7/2021    Allergies   Allergen Reactions    Aleve [Naproxen]      Outpatient Medications Marked as Taking for the 5/7/21 encounter (Office Visit) with Duc Armando MD   Medication Sig Dispense Refill    furosemide (LASIX) 20 MG tablet Take 1 tablet by mouth as needed (edema) 30 tablet 1    gabapentin (NEURONTIN) 100 MG capsule Take 1 capsule by mouth nightly as needed (back pain) for up to 30 days. Intended supply: 90 days 30 capsule 1    dilTIAZem HCl ER Coated Beads (CARDIZEM LA) 240 MG TB24 TAKE 1 TABLET BY MOUTH EVERY DAY 90 tablet 0    lovastatin (MEVACOR) 40 MG tablet TAKE 1 TABLET BY MOUTH EVERYDAY AT BEDTIME 90 tablet 3    SODIUM CHLORIDE, EXTERNAL, 0.9 % SOLN Apply 10 mLs topically daily 1000 mL 0    famotidine (PEPCID) 20 MG tablet Take 1 tablet by mouth 2 times daily 60 tablet 11    XARELTO 15 MG TABS tablet TAKE 1 TABLET BY MOUTH EVERY DAY 90 tablet 1    polyethylene glycol (GLYCOLAX) 17 g packet Take 17 g by mouth daily as needed for Constipation      Blood Pressure KIT 1 each by Does not apply route once a week 1 kit 0         Vitals:    05/07/21 0852   BP: 136/78   Site: Right Upper Arm   Position: Sitting   Cuff Size: Medium Adult   Pulse: 60   SpO2: 97%   Weight: 116 lb (52.6 kg)   Height: 5' (1.524 m)     Body mass index is 22.65 kg/m². Wt Readings from Last 3 Encounters:   05/07/21 116 lb (52.6 kg)   02/04/21 123 lb (55.8 kg)   01/13/21 123 lb (55.8 kg)     BP Readings from Last 3 Encounters:   05/07/21 136/78   02/04/21 135/83   01/21/21 132/72        Chief Complaint   Patient presents with    Back Pain    Circulatory Problem    Dehydration       HPI    Back pain:  2 weeks ago got a treadmill. Middle. Constant. Took tylenol prn. Worse with any movement. Bilateral. No symptoms in her legs. No incontinence. Spending most of her time sitting in her chair. Has not improved.      Constipation:  Has had to use Inability: Not on file    Transportation needs     Medical: Not on file     Non-medical: Not on file   Tobacco Use    Smoking status: Never Smoker    Smokeless tobacco: Never Used   Substance and Sexual Activity    Alcohol use: No    Drug use: Never    Sexual activity: Not on file   Lifestyle    Physical activity     Days per week: Not on file     Minutes per session: Not on file    Stress: Not on file   Relationships    Social connections     Talks on phone: Not on file     Gets together: Not on file     Attends Synagogue service: Not on file     Active member of club or organization: Not on file     Attends meetings of clubs or organizations: Not on file     Relationship status: Not on file    Intimate partner violence     Fear of current or ex partner: Not on file     Emotionally abused: Not on file     Physically abused: Not on file     Forced sexual activity: Not on file   Other Topics Concern    Not on file   Social History Narrative    Not on file         Review of Systems  As documented in the HPI. Currently no complaints of CP or CIERA. Physical Exam  Constitutional:       General: She is not in acute distress. Appearance: She is well-developed. HENT:      Head: Normocephalic and atraumatic. Cardiovascular:      Rate and Rhythm: Normal rate and regular rhythm. Heart sounds: Normal heart sounds. No murmur. Pulmonary:      Effort: Pulmonary effort is normal. No respiratory distress. Breath sounds: Normal breath sounds. Musculoskeletal:      Comments: LE + edema. neurovascularly intact    No TTP low back. Able to move in small amounts.  + Spasms that resolved after a minute. Skin:     General: Skin is warm and dry. Neurological:      Mental Status: She is alert. Psychiatric:         Behavior: Behavior normal.           Assessment/Plan     1. Essential hypertension  Stable. Continue current regimen. 2. Chronic atrial fibrillation  Stable.  Continue current

## 2021-05-10 DIAGNOSIS — E87.5 HYPERKALEMIA: Primary | ICD-10-CM

## 2021-05-17 ENCOUNTER — HOSPITAL ENCOUNTER (OUTPATIENT)
Dept: MRI IMAGING | Age: 86
Discharge: HOME | End: 2021-05-17
Payer: MEDICARE

## 2021-05-17 ENCOUNTER — HOSPITAL ENCOUNTER (OUTPATIENT)
Age: 86
Discharge: HOME | End: 2021-05-17
Payer: MEDICARE

## 2021-05-17 DIAGNOSIS — S32.020A COMPRESSION FRACTURE OF L2 VERTEBRA, INITIAL ENCOUNTER (HCC): Primary | ICD-10-CM

## 2021-05-17 DIAGNOSIS — R93.89 ABNORMAL X-RAY: ICD-10-CM

## 2021-05-17 LAB
ANION GAP SERPL CALCULATED.3IONS-SCNC: 9 MMOL/L (ref 3–16)
BUN BLDV-MCNC: 22 MG/DL (ref 7–20)
CALCIUM SERPL-MCNC: 9.5 MG/DL (ref 8.3–10.6)
CHLORIDE BLD-SCNC: 103 MMOL/L (ref 99–110)
CO2: 26 MMOL/L (ref 21–32)
CREAT SERPL-MCNC: 1.3 MG/DL (ref 0.6–1.2)
GFR AFRICAN AMERICAN: 46
GFR NON-AFRICAN AMERICAN: 38
GLUCOSE BLD-MCNC: 104 MG/DL (ref 70–99)
POTASSIUM SERPL-SCNC: 4.2 MMOL/L (ref 3.5–5.1)
SODIUM BLD-SCNC: 138 MMOL/L (ref 136–145)

## 2021-05-17 PROCEDURE — 36415 COLL VENOUS BLD VENIPUNCTURE: CPT

## 2021-05-17 PROCEDURE — 72148 MRI LUMBAR SPINE W/O DYE: CPT

## 2021-05-17 PROCEDURE — 80048 BASIC METABOLIC PNL TOTAL CA: CPT

## 2021-06-01 RX ORDER — FUROSEMIDE 20 MG/1
20 TABLET ORAL PRN
Qty: 30 TABLET | Refills: 1 | Status: SHIPPED | OUTPATIENT
Start: 2021-06-01 | End: 2021-06-08 | Stop reason: SDUPTHER

## 2021-06-08 ENCOUNTER — TELEPHONE (OUTPATIENT)
Dept: FAMILY MEDICINE CLINIC | Age: 86
End: 2021-06-08

## 2021-06-08 RX ORDER — FUROSEMIDE 20 MG/1
20 TABLET ORAL PRN
Qty: 90 TABLET | Refills: 1 | Status: SHIPPED | OUTPATIENT
Start: 2021-06-08 | End: 2022-06-29 | Stop reason: SDUPTHER

## 2021-06-08 NOTE — TELEPHONE ENCOUNTER
The rx for Furosemide 20 mg that was approved 6/1/21 needs to be for 90 day supply per pharmacy request.

## 2021-06-08 NOTE — TELEPHONE ENCOUNTER
Medication:   Requested Prescriptions     Pending Prescriptions Disp Refills    furosemide (LASIX) 20 MG tablet 90 tablet 1     Sig: Take 1 tablet by mouth as needed (edema)        Last Filled:  6/1/21 30 tabs 1 rf. Rx t'd for 90 day    Patient Phone Number: [phone] (home)     Last appt: 5/7/2021   Next appt: 8/12/2021    Last OARRS: No flowsheet data found.

## 2021-06-15 RX ORDER — RIVAROXABAN 15 MG/1
TABLET, FILM COATED ORAL
Qty: 90 TABLET | Refills: 1 | Status: SHIPPED | OUTPATIENT
Start: 2021-06-15 | End: 2021-11-08

## 2021-07-09 ENCOUNTER — PATIENT MESSAGE (OUTPATIENT)
Dept: FAMILY MEDICINE CLINIC | Age: 86
End: 2021-07-09

## 2021-07-09 RX ORDER — DILTIAZEM HYDROCHLORIDE EXTENDED-RELEASE TABLETS 240 MG/1
TABLET, EXTENDED RELEASE ORAL
Qty: 90 TABLET | Refills: 3 | Status: SHIPPED | OUTPATIENT
Start: 2021-07-09 | End: 2022-08-09

## 2021-07-09 NOTE — TELEPHONE ENCOUNTER
From: Marcey Snellen  To: Chery Cox MD  Sent: 7/9/2021 8:15 AM EDT  Subject: Prescription Question    Good morning,    Requesting a refill for gabapentin and Cardizem LA. The wrong prescription was called in instead of Cardizem LA it was Maxim? Not sure I'm I'm spelling that right but I've been on the Cardizem LA brand name for years. Can you please call this in for 90day supply as I will be out this weekend.    Thank you

## 2021-07-13 RX ORDER — GABAPENTIN 100 MG/1
100 CAPSULE ORAL NIGHTLY PRN
Qty: 30 CAPSULE | Refills: 1 | Status: SHIPPED | OUTPATIENT
Start: 2021-07-13 | End: 2021-08-12 | Stop reason: SDUPTHER

## 2021-07-13 NOTE — TELEPHONE ENCOUNTER
Medication and Quantity requested:     gabapentin (NEURONTIN) 100 MG capsule    Quantity 30     Last Visit  5/7/21    Pharmacy and phone number updated in [employer]:  Yes [facility] [facility]

## 2021-08-12 ENCOUNTER — OFFICE VISIT (OUTPATIENT)
Dept: FAMILY MEDICINE CLINIC | Age: 86
End: 2021-08-12
Payer: MEDICARE

## 2021-08-12 VITALS
SYSTOLIC BLOOD PRESSURE: 130 MMHG | BODY MASS INDEX: 22.07 KG/M2 | HEART RATE: 70 BPM | OXYGEN SATURATION: 97 % | DIASTOLIC BLOOD PRESSURE: 82 MMHG | WEIGHT: 113 LBS

## 2021-08-12 DIAGNOSIS — M48.56XD NON-TRAUMATIC COMPRESSION FRACTURE OF L2 LUMBAR VERTEBRA WITH ROUTINE HEALING, SUBSEQUENT ENCOUNTER: ICD-10-CM

## 2021-08-12 DIAGNOSIS — I10 ESSENTIAL HYPERTENSION: Primary | ICD-10-CM

## 2021-08-12 DIAGNOSIS — N18.32 STAGE 3B CHRONIC KIDNEY DISEASE (HCC): ICD-10-CM

## 2021-08-12 DIAGNOSIS — I48.20 CHRONIC ATRIAL FIBRILLATION (HCC): ICD-10-CM

## 2021-08-12 PROCEDURE — 99214 OFFICE O/P EST MOD 30 MIN: CPT | Performed by: FAMILY MEDICINE

## 2021-08-12 PROCEDURE — G8420 CALC BMI NORM PARAMETERS: HCPCS | Performed by: FAMILY MEDICINE

## 2021-08-12 PROCEDURE — 1036F TOBACCO NON-USER: CPT | Performed by: FAMILY MEDICINE

## 2021-08-12 PROCEDURE — 1090F PRES/ABSN URINE INCON ASSESS: CPT | Performed by: FAMILY MEDICINE

## 2021-08-12 PROCEDURE — 4040F PNEUMOC VAC/ADMIN/RCVD: CPT | Performed by: FAMILY MEDICINE

## 2021-08-12 PROCEDURE — 1123F ACP DISCUSS/DSCN MKR DOCD: CPT | Performed by: FAMILY MEDICINE

## 2021-08-12 PROCEDURE — G8427 DOCREV CUR MEDS BY ELIG CLIN: HCPCS | Performed by: FAMILY MEDICINE

## 2021-08-12 RX ORDER — GABAPENTIN 100 MG/1
100 CAPSULE ORAL NIGHTLY PRN
Qty: 90 CAPSULE | Refills: 1 | Status: SHIPPED | OUTPATIENT
Start: 2021-08-12 | End: 2022-03-14

## 2021-08-12 SDOH — ECONOMIC STABILITY: FOOD INSECURITY: WITHIN THE PAST 12 MONTHS, YOU WORRIED THAT YOUR FOOD WOULD RUN OUT BEFORE YOU GOT MONEY TO BUY MORE.: NEVER TRUE

## 2021-08-12 SDOH — ECONOMIC STABILITY: FOOD INSECURITY: WITHIN THE PAST 12 MONTHS, THE FOOD YOU BOUGHT JUST DIDN'T LAST AND YOU DIDN'T HAVE MONEY TO GET MORE.: NEVER TRUE

## 2021-08-12 ASSESSMENT — SOCIAL DETERMINANTS OF HEALTH (SDOH): HOW HARD IS IT FOR YOU TO PAY FOR THE VERY BASICS LIKE FOOD, HOUSING, MEDICAL CARE, AND HEATING?: NOT HARD AT ALL

## 2021-08-12 NOTE — PROGRESS NOTES
Ondina Marks   YOB: 1928    Date of Visit:  8/12/2021    Allergies   Allergen Reactions    Aleve [Naproxen]      Outpatient Medications Marked as Taking for the 8/12/21 encounter (Office Visit) with Domingo Clifford MD   Medication Sig Dispense Refill    gabapentin (NEURONTIN) 100 MG capsule Take 1 capsule by mouth nightly as needed (back pain) for up to 90 days. Intended supply: 90 days 90 capsule 1    dilTIAZem HCl ER Coated Beads (CARDIZEM LA) 240 MG TB24 TAKE 1 TABLET BY MOUTH EVERY DAY 90 tablet 3    XARELTO 15 MG TABS tablet TAKE 1 TABLET BY MOUTH EVERY DAY 90 tablet 1    furosemide (LASIX) 20 MG tablet Take 1 tablet by mouth as needed (edema) 90 tablet 1    lovastatin (MEVACOR) 40 MG tablet TAKE 1 TABLET BY MOUTH EVERYDAY AT BEDTIME 90 tablet 3    SODIUM CHLORIDE, EXTERNAL, 0.9 % SOLN Apply 10 mLs topically daily 1000 mL 0    famotidine (PEPCID) 20 MG tablet Take 1 tablet by mouth 2 times daily 60 tablet 11    polyethylene glycol (GLYCOLAX) 17 g packet Take 17 g by mouth daily as needed for Constipation      Blood Pressure KIT 1 each by Does not apply route once a week 1 kit 0         Vitals:    08/12/21 0849   BP: 130/82   Site: Right Upper Arm   Position: Sitting   Cuff Size: Medium Adult   Pulse: 70   SpO2: 97%   Weight: 113 lb (51.3 kg)     Body mass index is 22.07 kg/m². Wt Readings from Last 3 Encounters:   08/12/21 113 lb (51.3 kg)   05/07/21 116 lb (52.6 kg)   02/04/21 123 lb (55.8 kg)     BP Readings from Last 3 Encounters:   08/12/21 130/82   05/07/21 136/78   02/04/21 135/83        Chief Complaint   Patient presents with    Hypertension     follow up        HPI    Back pain/compression fracture on MRI: asymptomatic. Patient does not care to intervene. Does not want to do DXA.  Declined medication for osteoporosis.      Constipation:  Controlled with miralax.      Atrial Fibrillation: The patient is on xarelto for anticoagulation - reports she has always been on 15 mg and Cardizem for rate control. Hx of seeing Dr. Jose Olivera last in 2017. Matthew Epstein has been stable on this regimen a long time.      HTN: The patient is on Diltiazem. BP has been good at home.      HLD: She is on Mevacor.      CKD stage 3:      Ankle edema: the patient takes lasix as needed. Hasn't needed it in months.      GERD:  Takes pepcid BID. Has no symptoms.      Hx GI bleed 10/2020:  She had a colonoscopy which showed a polyp that was a tubular adenoma.  The bleeding was thought to be related to hemorrhoids - on annusol suppositories.  She was evaluated by general surgery in the hospital as she had bleeding the day after surgery. Haque Luria was initially held but resumed prior to discharge.       Hx of fall 3/20:  Slipped falling out of the shower.  H She declined additional intervention.  Granddaughter has purchase a bathtub seat now and has made arrangements so it is safer.      Hx of epistasix: hx of seeing ENT in . Given bactroban and saline gel to use.      Hx of ARF 2/2 sepsis- .       Hx of skin cancer: ? type     Hx of cataract surgery      SH:  6/15/20: she lives with her [address] is age 31(works for [employer]?)  and has lived with her since birth.  Patient's daughter used to live with them too but she  10 years ago.  No other children.        Social History     Socioeconomic History    Marital status:      Spouse name: Not on file    Number of children: Not on file    Years of education: Not on file    Highest education level: Not on file   Occupational History    Not on file   Tobacco Use    Smoking status: Never Smoker    Smokeless tobacco: Never Used   Vaping Use    Vaping Use: Never used   Substance and Sexual Activity    Alcohol use: No    Drug use: Never    Sexual activity: Not on file   Other Topics Concern    Not on file   Social History Narrative    Not on file     Social Determinants of Health     Financial Resource Strain: gabapentin qhs.      3. Chronic atrial fibrillation  Stable. Continue current regimen. 4. Stage 3b chronic kidney disease ([facility] [address]  Stable. Continue current regimen. Patient declines GERD symptoms- ok to take pepcid prn rather then scheduled. Discussed medications with patient, who voiced understanding of their use and indications. All questions answered. Return in about 6 months (around 2/12/2022) for Annual Wellness Visit. Documentation was done using voice recognition dragon software. Efforts were made to ensure accuracy; however, inadvertent, unintentional computerized transcription errors may be present. --Sharon Parker MD on 8/12/2021    An electronic signature was used to authenticate this note.

## 2021-08-14 DIAGNOSIS — E87.5 SERUM POTASSIUM ELEVATED: Primary | ICD-10-CM

## 2021-11-08 RX ORDER — RIVAROXABAN 15 MG/1
TABLET, FILM COATED ORAL
Qty: 90 TABLET | Refills: 1 | Status: SHIPPED | OUTPATIENT
Start: 2021-11-08 | End: 2022-05-16

## 2021-11-08 NOTE — TELEPHONE ENCOUNTER
Medication:   Requested Prescriptions     Pending Prescriptions Disp Refills    XARELTO 15 MG TABS tablet [Pharmacy Med Name: Yandy Alan 15 MG TABLET] 90 tablet 1     Sig: TAKE 1 TABLET BY MOUTH EVERY DAY        Last Filled:  6/15/2021 #90 w/1 RF    Patient Phone Number: [phone] (home)     Last appt: 8/12/2021   Next appt: Visit date not found    Last OARRS:   RX Monitoring 8/12/2021   Periodic Controlled Substance Monitoring Possible medication side effects, risk of tolerance/dependence & alternative treatments discussed. ;No signs of potential drug abuse or diversion identified.

## 2022-02-04 RX ORDER — LOVASTATIN 40 MG/1
TABLET ORAL
Qty: 90 TABLET | Refills: 0 | Status: SHIPPED | OUTPATIENT
Start: 2022-02-04 | End: 2022-05-02

## 2022-02-04 NOTE — TELEPHONE ENCOUNTER
Last OV 8/12/2021   Next OV Visit date not found    Requested Prescriptions     Pending Prescriptions Disp Refills    lovastatin (MEVACOR) 40 MG tablet [Pharmacy Med Name: LOVASTATIN 40 MG TABLET] 90 tablet 3     Sig: TAKE 1 TABLET BY MOUTH EVERYDAY AT BEDTIME    pended

## 2022-03-12 DIAGNOSIS — G89.29 CHRONIC BACK PAIN, UNSPECIFIED BACK LOCATION, UNSPECIFIED BACK PAIN LATERALITY: Primary | ICD-10-CM

## 2022-03-12 DIAGNOSIS — M54.9 CHRONIC BACK PAIN, UNSPECIFIED BACK LOCATION, UNSPECIFIED BACK PAIN LATERALITY: Primary | ICD-10-CM

## 2022-03-14 RX ORDER — GABAPENTIN 100 MG/1
100 CAPSULE ORAL NIGHTLY PRN
Qty: 90 CAPSULE | Refills: 1 | Status: SHIPPED | OUTPATIENT
Start: 2022-03-14 | End: 2022-05-09 | Stop reason: SDUPTHER

## 2022-03-28 ENCOUNTER — TELEPHONE (OUTPATIENT)
Dept: FAMILY MEDICINE CLINIC | Age: 87
End: 2022-03-28

## 2022-03-28 NOTE — TELEPHONE ENCOUNTER
----- Message from Yolanda Angel sent at 3/28/2022 10:11 AM EDT -----  Subject: Appointment Request    Reason for Call: Routine Medicare AWV    QUESTIONS  Type of Appointment? Established Patient  Reason for appointment request? Available appointments did not meet   patient need  Additional Information for Provider? Patient's [facility] Jules Armandojacki is   attempting to schedule for patient's AWV, [facility] also stated patient   is currently experiencing side pain right plank area. Brigette Gupta would   like to be contacted, as pcp currently has VV only  ---------------------------------------------------------------------------  --------------  CALL BACK INFO  What is the best way for the office to contact you? OK to leave message on   [employer], OK to respond with electronic message via [employer] portal (only   for patients who have registered [employer] account)  Preferred Call Back Phone Number? [phone]  ---------------------------------------------------------------------------  --------------  SCRIPT ANSWERS  Relationship to Patient? Other  Representative Name? [facility]  Additional information verified (besides Name and Date of Birth)? Phone   Number  (If the patient has Medicare as their primary insurance coverage ask this   question) Are you requesting a Medicare Annual Wellness Visit? Yes   (Is the patient requesting a pap smear with their physical exam?)? No  (Is the patient requesting their annual physical and does not need PAP or   AWV per above?)? No  Have you been diagnosed with, awaiting test results for, or told that you   are suspected of having COVID-19 (Coronavirus)? (If patient has tested   negative or was tested as a requirement for work, school, or travel and   not based on symptoms, answer no)? No  Within the past 10 days have you developed any of the following symptoms   (answer no if symptoms have been present longer than 10 days or began   more than 10 days ago)?  Fever or Chills, Cough, Shortness of breath or   difficulty breathing, Loss of taste or smell, Sore throat, Nasal   congestion, Sneezing or runny nose, Fatigue or generalized body aches   (answer no if pain is specific to a body part e.g. back pain), Diarrhea,   Headache? No  Have you had close contact with someone with COVID-19 in the last 7 days? No  (Service Expert  click yes below to proceed with [employer] As Usual   Scheduling)?  Yes

## 2022-03-28 NOTE — TELEPHONE ENCOUNTER
Can do 12:40 this Thursday- that first open same day. If needs to be seen sooner for pain could you please advise to go to the ER or urgent care.

## 2022-03-31 ENCOUNTER — OFFICE VISIT (OUTPATIENT)
Dept: FAMILY MEDICINE CLINIC | Age: 87
End: 2022-03-31
Payer: MEDICARE

## 2022-03-31 VITALS
BODY MASS INDEX: 22.04 KG/M2 | OXYGEN SATURATION: 98 % | TEMPERATURE: 97.8 F | HEIGHT: 58 IN | SYSTOLIC BLOOD PRESSURE: 130 MMHG | HEART RATE: 91 BPM | WEIGHT: 105 LBS | DIASTOLIC BLOOD PRESSURE: 82 MMHG

## 2022-03-31 DIAGNOSIS — I48.20 CHRONIC ATRIAL FIBRILLATION (HCC): ICD-10-CM

## 2022-03-31 DIAGNOSIS — R06.02 SHORTNESS OF BREATH: ICD-10-CM

## 2022-03-31 DIAGNOSIS — N18.32 STAGE 3B CHRONIC KIDNEY DISEASE (HCC): ICD-10-CM

## 2022-03-31 DIAGNOSIS — Z00.00 MEDICARE ANNUAL WELLNESS VISIT, SUBSEQUENT: Primary | ICD-10-CM

## 2022-03-31 DIAGNOSIS — R10.9 ABDOMINAL PAIN, UNSPECIFIED ABDOMINAL LOCATION: ICD-10-CM

## 2022-03-31 DIAGNOSIS — M79.89 LEG SWELLING: ICD-10-CM

## 2022-03-31 LAB
A/G RATIO: 1.5 (ref 1.1–2.2)
ALBUMIN SERPL-MCNC: 4.1 G/DL (ref 3.4–5)
ALP BLD-CCNC: 78 U/L (ref 40–129)
ALT SERPL-CCNC: 11 U/L (ref 10–40)
ANION GAP SERPL CALCULATED.3IONS-SCNC: 12 MMOL/L (ref 3–16)
AST SERPL-CCNC: 17 U/L (ref 15–37)
BACTERIA: ABNORMAL /HPF
BASOPHILS ABSOLUTE: 0 K/UL (ref 0–0.2)
BASOPHILS RELATIVE PERCENT: 0.6 %
BILIRUB SERPL-MCNC: 0.5 MG/DL (ref 0–1)
BILIRUBIN URINE: NEGATIVE
BLOOD, URINE: ABNORMAL
BUN BLDV-MCNC: 25 MG/DL (ref 7–20)
CALCIUM SERPL-MCNC: 9.6 MG/DL (ref 8.3–10.6)
CHLORIDE BLD-SCNC: 100 MMOL/L (ref 99–110)
CLARITY: ABNORMAL
CO2: 27 MMOL/L (ref 21–32)
COLOR: YELLOW
CREAT SERPL-MCNC: 1.1 MG/DL (ref 0.6–1.2)
EOSINOPHILS ABSOLUTE: 0.1 K/UL (ref 0–0.6)
EOSINOPHILS RELATIVE PERCENT: 0.8 %
EPITHELIAL CELLS, UA: 6 /HPF (ref 0–5)
GFR AFRICAN AMERICAN: 56
GFR NON-AFRICAN AMERICAN: 46
GLUCOSE BLD-MCNC: 88 MG/DL (ref 70–99)
GLUCOSE URINE: NEGATIVE MG/DL
HCT VFR BLD CALC: 40.2 % (ref 36–48)
HEMOGLOBIN: 13.1 G/DL (ref 12–16)
HYALINE CASTS: 6 /LPF (ref 0–8)
KETONES, URINE: NEGATIVE MG/DL
LEUKOCYTE ESTERASE, URINE: ABNORMAL
LYMPHOCYTES ABSOLUTE: 1.4 K/UL (ref 1–5.1)
LYMPHOCYTES RELATIVE PERCENT: 20.3 %
MCH RBC QN AUTO: 31.8 PG (ref 26–34)
MCHC RBC AUTO-ENTMCNC: 32.6 G/DL (ref 31–36)
MCV RBC AUTO: 97.5 FL (ref 80–100)
MICROSCOPIC EXAMINATION: YES
MONOCYTES ABSOLUTE: 0.7 K/UL (ref 0–1.3)
MONOCYTES RELATIVE PERCENT: 10 %
NEUTROPHILS ABSOLUTE: 4.7 K/UL (ref 1.7–7.7)
NEUTROPHILS RELATIVE PERCENT: 68.3 %
NITRITE, URINE: POSITIVE
PDW BLD-RTO: 14.6 % (ref 12.4–15.4)
PH UA: 6.5 (ref 5–8)
PLATELET # BLD: 214 K/UL (ref 135–450)
PMV BLD AUTO: 8.8 FL (ref 5–10.5)
POTASSIUM SERPL-SCNC: 4.4 MMOL/L (ref 3.5–5.1)
PRO-BNP: 1130 PG/ML (ref 0–449)
PROTEIN UA: ABNORMAL MG/DL
RBC # BLD: 4.12 M/UL (ref 4–5.2)
RBC UA: 4 /HPF (ref 0–4)
SODIUM BLD-SCNC: 139 MMOL/L (ref 136–145)
SPECIFIC GRAVITY UA: 1.02 (ref 1–1.03)
TOTAL PROTEIN: 6.8 G/DL (ref 6.4–8.2)
TSH SERPL DL<=0.05 MIU/L-ACNC: 2.83 UIU/ML (ref 0.27–4.2)
URINE TYPE: ABNORMAL
UROBILINOGEN, URINE: 1 E.U./DL
WBC # BLD: 6.9 K/UL (ref 4–11)
WBC UA: 59 /HPF (ref 0–5)

## 2022-03-31 PROCEDURE — 1090F PRES/ABSN URINE INCON ASSESS: CPT | Performed by: FAMILY MEDICINE

## 2022-03-31 PROCEDURE — G0439 PPPS, SUBSEQ VISIT: HCPCS | Performed by: FAMILY MEDICINE

## 2022-03-31 PROCEDURE — 99214 OFFICE O/P EST MOD 30 MIN: CPT | Performed by: FAMILY MEDICINE

## 2022-03-31 PROCEDURE — G8427 DOCREV CUR MEDS BY ELIG CLIN: HCPCS | Performed by: FAMILY MEDICINE

## 2022-03-31 PROCEDURE — 1036F TOBACCO NON-USER: CPT | Performed by: FAMILY MEDICINE

## 2022-03-31 PROCEDURE — 1123F ACP DISCUSS/DSCN MKR DOCD: CPT | Performed by: FAMILY MEDICINE

## 2022-03-31 PROCEDURE — 4040F PNEUMOC VAC/ADMIN/RCVD: CPT | Performed by: FAMILY MEDICINE

## 2022-03-31 PROCEDURE — G8420 CALC BMI NORM PARAMETERS: HCPCS | Performed by: FAMILY MEDICINE

## 2022-03-31 PROCEDURE — G8484 FLU IMMUNIZE NO ADMIN: HCPCS | Performed by: FAMILY MEDICINE

## 2022-03-31 RX ORDER — PANTOPRAZOLE SODIUM 20 MG/1
20 TABLET, DELAYED RELEASE ORAL
Qty: 30 TABLET | Refills: 0 | Status: SHIPPED | OUTPATIENT
Start: 2022-03-31 | End: 2022-04-25

## 2022-03-31 SDOH — SOCIAL STABILITY: SOCIAL NETWORK: HOW OFTEN DO YOU ATTENT MEETINGS OF THE CLUB OR ORGANIZATION YOU BELONG TO?: NEVER

## 2022-03-31 SDOH — ECONOMIC STABILITY: TRANSPORTATION INSECURITY
IN THE PAST 12 MONTHS, HAS THE LACK OF TRANSPORTATION KEPT YOU FROM MEDICAL APPOINTMENTS OR FROM GETTING MEDICATIONS?: NO

## 2022-03-31 SDOH — ECONOMIC STABILITY: FOOD INSECURITY: WITHIN THE PAST 12 MONTHS, THE FOOD YOU BOUGHT JUST DIDN'T LAST AND YOU DIDN'T HAVE MONEY TO GET MORE.: NEVER TRUE

## 2022-03-31 SDOH — SOCIAL STABILITY: SOCIAL NETWORK: HOW OFTEN DO YOU GET TOGETHER WITH FRIENDS OR RELATIVES?: MORE THAN THREE TIMES A WEEK

## 2022-03-31 SDOH — ECONOMIC STABILITY: INCOME INSECURITY: HOW HARD IS IT FOR YOU TO PAY FOR THE VERY BASICS LIKE FOOD, HOUSING, MEDICAL CARE, AND HEATING?: NOT HARD AT ALL

## 2022-03-31 SDOH — SOCIAL STABILITY: SOCIAL NETWORK: ARE YOU MARRIED, WIDOWED, DIVORCED, SEPARATED, NEVER MARRIED, OR LIVING WITH A PARTNER?: NEVER MARRIED

## 2022-03-31 SDOH — ECONOMIC STABILITY: TRANSPORTATION INSECURITY
IN THE PAST 12 MONTHS, HAS LACK OF TRANSPORTATION KEPT YOU FROM MEETINGS, WORK, OR FROM GETTING THINGS NEEDED FOR DAILY LIVING?: NO

## 2022-03-31 SDOH — ECONOMIC STABILITY: FOOD INSECURITY: WITHIN THE PAST 12 MONTHS, YOU WORRIED THAT YOUR FOOD WOULD RUN OUT BEFORE YOU GOT MONEY TO BUY MORE.: NEVER TRUE

## 2022-03-31 SDOH — ECONOMIC STABILITY: INCOME INSECURITY: IN THE LAST 12 MONTHS, WAS THERE A TIME WHEN YOU WERE NOT ABLE TO PAY THE MORTGAGE OR RENT ON TIME?: NO

## 2022-03-31 SDOH — HEALTH STABILITY: MENTAL HEALTH: HOW OFTEN DO YOU HAVE A DRINK CONTAINING ALCOHOL?: NEVER

## 2022-03-31 SDOH — HEALTH STABILITY: PHYSICAL HEALTH: ON AVERAGE, HOW MANY MINUTES DO YOU ENGAGE IN EXERCISE AT THIS LEVEL?: 0 MIN

## 2022-03-31 SDOH — HEALTH STABILITY: PHYSICAL HEALTH: ON AVERAGE, HOW MANY DAYS PER WEEK DO YOU ENGAGE IN MODERATE TO STRENUOUS EXERCISE (LIKE A BRISK WALK)?: 0 DAYS

## 2022-03-31 SDOH — ECONOMIC STABILITY: HOUSING INSECURITY
IN THE LAST 12 MONTHS, WAS THERE A TIME WHEN YOU DID NOT HAVE A STEADY PLACE TO SLEEP OR SLEPT IN A SHELTER (INCLUDING NOW)?: NO

## 2022-03-31 SDOH — SOCIAL STABILITY: SOCIAL NETWORK
IN A TYPICAL WEEK, HOW MANY TIMES DO YOU TALK ON THE PHONE WITH FAMILY, FRIENDS, OR NEIGHBORS?: MORE THAN THREE TIMES A WEEK

## 2022-03-31 SDOH — ECONOMIC STABILITY: HOUSING INSECURITY: IN THE LAST 12 MONTHS, HOW MANY PLACES HAVE YOU LIVED?: 1

## 2022-03-31 SDOH — HEALTH STABILITY: MENTAL HEALTH
STRESS IS WHEN SOMEONE FEELS TENSE, NERVOUS, ANXIOUS, OR CAN'T SLEEP AT NIGHT BECAUSE THEIR MIND IS TROUBLED. HOW STRESSED ARE YOU?: NOT AT ALL

## 2022-03-31 SDOH — SOCIAL STABILITY: SOCIAL NETWORK
DO YOU BELONG TO ANY CLUBS OR ORGANIZATIONS SUCH AS CHURCH GROUPS UNIONS, FRATERNAL OR ATHLETIC GROUPS, OR SCHOOL GROUPS?: NO

## 2022-03-31 SDOH — SOCIAL STABILITY: SOCIAL NETWORK: HOW OFTEN DO YOU ATTEND CHURCH OR RELIGIOUS SERVICES?: MORE THAN 4 TIMES PER YEAR

## 2022-03-31 ASSESSMENT — PATIENT HEALTH QUESTIONNAIRE - PHQ9
1. LITTLE INTEREST OR PLEASURE IN DOING THINGS: 0
SUM OF ALL RESPONSES TO PHQ QUESTIONS 1-9: 0
2. FEELING DOWN, DEPRESSED OR HOPELESS: 0
SUM OF ALL RESPONSES TO PHQ QUESTIONS 1-9: 0
SUM OF ALL RESPONSES TO PHQ9 QUESTIONS 1 & 2: 0

## 2022-03-31 NOTE — PROGRESS NOTES
Katerina Bennett   YOB: 1928    Date of Visit:  3/31/2022    Allergies   Allergen Reactions    Aleve [Naproxen]      Outpatient Medications Marked as Taking for the 3/31/22 encounter (Office Visit) with Liz Santillan MD   Medication Sig Dispense Refill    Docusate Sodium (DOCULASE PO) Take by mouth      pantoprazole (PROTONIX) 20 MG tablet Take 1 tablet by mouth every morning (before breakfast) 30 tablet 0    gabapentin (NEURONTIN) 100 MG capsule TAKE 1 CAPSULE BY MOUTH NIGHTLY AS NEEDED (BACK PAIN) FOR UP TO 90 DAYS. INTENDED SUPPLY: 90 DAYS 90 capsule 1    lovastatin (MEVACOR) 40 MG tablet TAKE 1 TABLET BY MOUTH EVERYDAY AT BEDTIME 90 tablet 0    XARELTO 15 MG TABS tablet TAKE 1 TABLET BY MOUTH EVERY DAY 90 tablet 1    dilTIAZem HCl ER Coated Beads (CARDIZEM LA) 240 MG TB24 TAKE 1 TABLET BY MOUTH EVERY DAY 90 tablet 3    furosemide (LASIX) 20 MG tablet Take 1 tablet by mouth as needed (edema) 90 tablet 1    famotidine (PEPCID) 20 MG tablet Take 1 tablet by mouth 2 times daily 60 tablet 11    polyethylene glycol (GLYCOLAX) 17 g packet Take 17 g by mouth daily as needed for Constipation           Vitals:    03/31/22 1243   BP: 130/82   Site: Right Upper Arm   Position: Sitting   Cuff Size: Small Adult   Pulse: 91   Temp: 97.8 °F (36.6 °C)   TempSrc: Oral   SpO2: 98%   Weight: 105 lb (47.6 kg)   Height: 4' 10\" (1.473 m)     Body mass index is 21.95 kg/m². Wt Readings from Last 3 Encounters:   03/31/22 105 lb (47.6 kg)   08/12/21 113 lb (51.3 kg)   05/07/21 116 lb (52.6 kg)     BP Readings from Last 3 Encounters:   03/31/22 130/82   08/12/21 130/82   05/07/21 136/78        Chief Complaint   Patient presents with    Leg Swelling     skin is frail, if she gets knicked she bleeds and leaks CONCERN FOR CELLULITIS    Flank Pain     R SIDE new pain       HPI  Abdominal pain:  Across her whole belly. Going on for months. L side has been stable but R side has been worse the last few weeks. Has a good BM daily. No urinary symptoms. No fevers. No nausea or vomiting. Hasn't had an appetite for a long time. Has had ensure and boost before but it caused constipation. Has been back on track with her bowel for a few months. Takes miralax daily and colace daily. Leg swelling:  Going on for a few months. Getting worse the last month. Some days it is unconfortable. Elevates her legs. Doesn't take the lasix very often. Has chronic shortness of breath. No worse then it is usually. Back pain/compression fracture on MRI: asymptomatic. Patient does not care to intervene. Does not want to do DXA. Declined medication for osteoporosis.      Constipation:  Controlled with miralax.      Atrial Fibrillation: The patient is on xarelto for anticoagulation - reports she has always been on 15 mg and Cardizem for rate control. Hx of seeing Dr. Coleen Roper last in 2017. Leila Lyons has been stable on this regimen a long time.      HTN: The patient is on Diltiazem. BP has been good at home.      HLD: She is on Mevacor.      CKD stage 3:      Ankle edema: the patient takes lasix as needed. Hasn't needed it in months.      GERD:  Takes pepcid BID. Has no symptoms.      Hx GI bleed 10/2020:  She had a colonoscopy which showed a polyp that was a tubular adenoma.  The bleeding was thought to be related to hemorrhoids - on annusol suppositories.  She was evaluated by general surgery in the hospital as she had bleeding the day after surgery. Jorge Dolin was initially held but resumed prior to discharge.       Hx of fall 3/20:  Slipped falling out of the shower.  H She declined additional intervention.  Granddaughter has purchase a bathtub seat now and has made arrangements so it is safer.      Hx of epistasix: hx of seeing ENT in 2019.  Given bactroban and saline gel to use.      Hx of ARF 2/2 sepsis- 2006.       Hx of skin cancer: ? 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  Patient's daughter used to live with them too but she  10 years ago. No other children.           Social History     Socioeconomic History    Marital status:      Spouse name: Not on file    Number of children: Not on file    Years of education: Not on file    Highest education level: Not on file   Occupational History    Not on file   Tobacco Use    Smoking status: Never Smoker    Smokeless tobacco: Never Used   Vaping Use    Vaping Use: Never used   Substance and Sexual Activity    Alcohol use: No    Drug use: Never    Sexual activity: Not on file   Other Topics Concern    Not on file   Social History Narrative    Not on file     Social Determinants of Health     Financial Resource Strain: Low Risk     Difficulty of Paying Living Expenses: Not hard at all   Food Insecurity: No Food Insecurity    Worried About Running Out of Food in the Last Year: Never true    Siri of Food in the Last Year: Never true   Transportation Needs: No Transportation Needs    Lack of Transportation (Medical): No    Lack of Transportation (Non-Medical): No   Physical Activity: Inactive    Days of Exercise per Week: 0 days    Minutes of Exercise per Session: 0 min   Stress: No Stress Concern Present    Feeling of Stress : Not at all   Social Connections:  Moderately Isolated    Frequency of Communication with Friends and Family: More than three times a week    Frequency of Social Gatherings with Friends and Family: More than three times a week    Attends Restorationist Services: More than 4 times per year    Active Member of [address] [place] [address] or Organizations: No    Attends Club or Organization Meetings: Never    Marital Status: Never    Intimate Partner Violence:     Fear of Current or Ex-Partner: Not on file    Emotionally Abused: Not on file    Physically Abused: Not on file    Sexually Abused: Not on file Housing Stability: Low Risk     Unable to Pay for Housing in the Last Year: No    Number of Places Lived in the Last Year: 1    Unstable Housing in the Last Year: No         Review of Systems  As documented in the HPI. Currently no complaints of CP or CIERA. Physical Exam  Constitutional:       General: She is not in acute distress. Appearance: She is well-developed. HENT:      Head: Normocephalic and atraumatic. Cardiovascular:      Rate and Rhythm: Normal rate and regular rhythm. Heart sounds: Normal heart sounds. No murmur heard. Pulmonary:      Effort: Pulmonary effort is normal. No respiratory distress. Breath sounds: Normal breath sounds. Musculoskeletal:      Comments: 2+ edema bilateral lower extremities. Skin:     General: Skin is warm and dry. Neurological:      Mental Status: She is alert. Psychiatric:         Behavior: Behavior normal.           Assessment/Plan     1. Leg swelling  Persistent. Labs. Elevated. Compression. If BNP discussed doing echo- patient and granddaughter will consider if they would want to do that. Go to lasix daily - recheck BMP a week or two after making that change. - Comprehensive Metabolic Panel; Future  - Brain Natriuretic Peptide; Future  - Basic Metabolic Panel; Future    2. Chronic atrial fibrillation  Stable. Continue current regimen.   - Brain Natriuretic Peptide; Future  - TSH; Future    3. Abdominal pain, unspecified abdominal location  Persistent. Labs. Trial of PPI instead of H2B. Discussed CT- would like to wait for now. Keep pain/bowel movement diary.   - Comprehensive Metabolic Panel; Future  - CBC with Auto Differential; Future  - Urinalysis with Microscopic; Future  - Culture, Urine; Future  - Brain Natriuretic Peptide; Future    4. Stage 3b chronic kidney disease (HCC)  Stable. Continue current regimen.   - Brain Natriuretic Peptide; Future    5. Shortness of breath   . mhst  - Brain Natriuretic Peptide; Future    6. Medicare annual wellness visit, subsequent  See below      Discussed medications with patient, who voiced understanding of their use and indications. All questions answered. Return in about 3 months (around 6/30/2022) for leg swelling. Documentation was done using voice recognition dragon software. Efforts were made to ensure accuracy; however, inadvertent, unintentional computerized transcription errors may be present. --Crystal Turner MD on 3/31/2022     An electronic signature was used to authenticate this note. Medicare Annual Wellness Visit    Jeevan Srinivasan is here for Leg Swelling (skin is frail, if she gets knicked she bleeds and leaks CONCERN FOR CELLULITIS) and Flank Pain (R SIDE new pain)    Assessment & Plan   Medicare annual wellness visit, subsequent  Leg swelling  -     Comprehensive Metabolic Panel; Future  -     Brain Natriuretic Peptide; Future  -     Basic Metabolic Panel; Future  Chronic atrial fibrillation  -     Brain Natriuretic Peptide; Future  -     TSH; Future  Abdominal pain, unspecified abdominal location  -     Comprehensive Metabolic Panel; Future  -     CBC with Auto Differential; Future  -     Urinalysis with Microscopic; Future  -     Culture, Urine; Future  -     Brain Natriuretic Peptide; Future  Stage 3b chronic kidney disease ([facility] [address]  -     Brain Natriuretic Peptide; Future  Shortness of breath   -     Brain Natriuretic Peptide; Future      Recommendations for Preventive Services Due: see orders and patient instructions/AVS.  Recommended screening schedule for the next 5-10 years is provided to the patient in written form: see Patient Instructions/AVS.     Return in about 3 months (around 6/30/2022) for leg swelling. Subjective   The following acute and/or chronic problems were also addressed today: see note above. Patient's complete Health Risk Assessment and screening values have been reviewed and are found in Flowsheets.  The following problems were reviewed today and where indicated follow up appointments were made and/or referrals ordered. Positive Risk Factor Screenings with Interventions:     Cognitive: Words recalled: 1 Word Recalled  Clock Drawing Test (CDT): Normal  Total Score Interpretation: Abnormal Mini-Cog  Did the patient refuse to take the cognition test?: No    Cognitive Impairment Interventions:  · Patient declines any further evaluation/treatment for cognitive impairment             General Health and ACP:  General  In general, how would you say your health is?: Good  In the past 7 days, have you experienced any of the following: New or Increased Pain, New or Increased Fatigue, Loneliness, Social Isolation, Stress or Anger?: (!) Yes  Select all that apply: (!) New or Increased Pain  Do you get the social and emotional support that you need?: Yes  Do you have a Living Will?: Yes    Advance Directives     Power of  Living Will ACP-Advance Directive ACP-Power of     Not on File Filed on 04/20/16 Filed Not on File      General Health Risk Interventions:  · see above    Health Habits/Nutrition:     Physical Activity: Inactive    Days of Exercise per Week: 0 days    Minutes of Exercise per Session: 0 min     Have you lost any weight without trying in the past 3 months?: (!) Yes  Body mass index: 21.94  Have you seen the dentist within the past year?: (!) No    Health Habits/Nutrition Interventions:  · Nutritional issues:  discussed weight loss.  patient will work on increasing caloric intake    Hearing/Vision:  Do you or your family notice any trouble with your hearing that hasn't been managed with hearing aids?: No  Do you have difficulty driving, watching TV, or doing any of your daily activities because of your eyesight?: No  Have you had an eye exam within the past year?: (!) No  No exam data present    Hearing/Vision Interventions:  · Vision concerns:  patient encouraged to make appointment with his/her eye specialist ADLs:  In the past 7 days, did you need help from others to perform any of the following everyday activities: Eating, dressing, grooming, bathing, toileting, or walking/balance?: (!) Yes  Select all that apply: (!) Bathing,Grooming  In the past 7 days, did you need help from others to take care of any of the following: Laundry, housekeeping, banking/finances, shopping, telephone use, food preparation, transportation, or taking medications?: (!) Yes  Select all that apply: (!) Laundry,Housekeeping,Banking/Finances,Shopping,Transportation    ADL Interventions:  · Patient declines any further evaluation/treatment for this issue          Objective   Vitals:    03/31/22 1243   BP: 130/82   Site: Right Upper Arm   Position: Sitting   Cuff Size: Small Adult   Pulse: 91   Temp: 97.8 °F (36.6 °C)   TempSrc: Oral   SpO2: 98%   Weight: 105 lb (47.6 kg)   Height: 4' 10\" (1.473 m)      Body mass index is 21.95 kg/m². Allergies   Allergen Reactions    Aleve [Naproxen]      Prior to Visit Medications    Medication Sig Taking? Authorizing Provider   Docusate Sodium (DOCULASE PO) Take by mouth Yes Historical Provider, MD   pantoprazole (PROTONIX) 20 MG tablet Take 1 tablet by mouth every morning (before breakfast) Yes Lanette Curran MD   gabapentin (NEURONTIN) 100 MG capsule TAKE 1 CAPSULE BY MOUTH NIGHTLY AS NEEDED (BACK PAIN) FOR UP TO 90 DAYS.  INTENDED SUPPLY: 90 DAYS Yes Lanette Curran MD   lovastatin (MEVACOR) 40 MG tablet TAKE 1 TABLET BY MOUTH EVERYDAY AT BEDTIME Yes Tom Levi MD   XARELTO 15 MG TABS tablet TAKE 1 TABLET BY MOUTH EVERY DAY Yes Marija Giordano MD   dilTIAZem HCl ER Coated Beads (CARDIZEM LA) 240 MG TB24 TAKE 1 TABLET BY MOUTH EVERY DAY Yes Marija Giodrano MD   furosemide (LASIX) 20 MG tablet Take 1 tablet by mouth as needed (edema) Yes Lanette Curran MD   famotidine (PEPCID) 20 MG tablet Take 1 tablet by mouth 2 times daily Yes Lila Ontiveros MD   polyethylene glycol (GLYCOLAX) 17 g packet Take 17 g by mouth daily as needed for Constipation Yes Historical Provider, MD   SODIUM CHLORIDE, EXTERNAL, 0.9 % SOLN Apply 10 mLs topically daily  Patient not taking: Reported on 3/31/2022  Hal Richard, DO   Blood Pressure KIT 1 each by Does not apply route once a week  Shelda Severe, MD       CareTeam (Including outside providers/suppliers regularly involved in providing care):   Patient Care Team:  Shelda Severe, MD as PCP - General (Family Medicine)  Shelda Severe, MD as PCP - REHABILITATION [facility] Empaneled Provider    Reviewed and updated this visit:  Allergies  Meds

## 2022-03-31 NOTE — PATIENT INSTRUCTIONS
Personalized Preventive Plan for Jostin Latif - 3/31/2022  Medicare offers a range of preventive health benefits. Some of the tests and screenings are paid in full while other may be subject to a deductible, co-insurance, and/or copay. Some of these benefits include a comprehensive review of your medical history including lifestyle, illnesses that may run in your family, and various assessments and screenings as appropriate. After reviewing your medical record and screening and assessments performed today your provider may have ordered immunizations, labs, imaging, and/or referrals for you. A list of these orders (if applicable) as well as your Preventive Care list are included within your After Visit Summary for your review. Other Preventive Recommendations:    · A preventive eye exam performed by an eye specialist is recommended every 1-2 years to screen for glaucoma; cataracts, macular degeneration, and other eye disorders. · A preventive dental visit is recommended every 6 months. · Try to get at least 150 minutes of exercise per week or 10,000 steps per day on a pedometer . · Order or download the FREE \"Exercise & Physical Activity: Your Everyday Guide\" from [employer]. Call 4-286.411.5381 or search [employer] online. · You need 8517-5163 mg of calcium and 4721-8339 IU of vitamin D per day. It is possible to meet your calcium requirement with diet alone, but a vitamin D supplement is usually necessary to meet this goal.  · When exposed to the sun, use a sunscreen that protects against both UVA and UVB radiation with an SPF of 30 or greater. Reapply every 2 to 3 hours or after sweating, drying off with a towel, or swimming. · Always wear a seat belt when traveling in a car. Always wear a helmet when riding a bicycle or motorcycle.

## 2022-04-01 RX ORDER — NITROFURANTOIN 25; 75 MG/1; MG/1
100 CAPSULE ORAL 2 TIMES DAILY
Qty: 14 CAPSULE | Refills: 0 | Status: SHIPPED | OUTPATIENT
Start: 2022-04-01 | End: 2022-04-08

## 2022-04-02 LAB
ORGANISM: ABNORMAL
URINE CULTURE, ROUTINE: ABNORMAL

## 2022-04-07 DIAGNOSIS — N30.00 ACUTE CYSTITIS WITHOUT HEMATURIA: Primary | ICD-10-CM

## 2022-04-11 DIAGNOSIS — N39.0 URINARY TRACT INFECTION WITHOUT HEMATURIA, SITE UNSPECIFIED: Primary | ICD-10-CM

## 2022-04-12 DIAGNOSIS — I50.9 CONGESTIVE HEART FAILURE, UNSPECIFIED HF CHRONICITY, UNSPECIFIED HEART FAILURE TYPE (HCC): ICD-10-CM

## 2022-04-12 DIAGNOSIS — R10.84 GENERALIZED ABDOMINAL PAIN: Primary | ICD-10-CM

## 2022-04-12 DIAGNOSIS — R60.9 SWELLING: Primary | ICD-10-CM

## 2022-04-12 DIAGNOSIS — I48.20 CHRONIC ATRIAL FIBRILLATION (HCC): ICD-10-CM

## 2022-04-12 DIAGNOSIS — R79.89 ELEVATED BRAIN NATRIURETIC PEPTIDE (BNP) LEVEL: ICD-10-CM

## 2022-04-25 RX ORDER — PANTOPRAZOLE SODIUM 20 MG/1
TABLET, DELAYED RELEASE ORAL
Qty: 30 TABLET | Refills: 0 | Status: SHIPPED | OUTPATIENT
Start: 2022-04-25 | End: 2022-05-31

## 2022-04-25 RX ORDER — CEPHALEXIN 500 MG/1
500 CAPSULE ORAL 3 TIMES DAILY
Qty: 21 CAPSULE | Refills: 1 | Status: SHIPPED | OUTPATIENT
Start: 2022-04-25 | End: 2022-05-02

## 2022-04-25 NOTE — TELEPHONE ENCOUNTER
Medication:   Requested Prescriptions     Pending Prescriptions Disp Refills    pantoprazole (PROTONIX) 20 MG tablet [Pharmacy Med Name: PANTOPRAZOLE SOD DR 20 MG TAB] 30 tablet 0     Sig: TAKE 1 TABLET BY MOUTH EVERY DAY BEFORE BREAKFAST        Last Filled:  3/31/2022 30 tabs 0 refills     Patient Phone Number: [phone] (home)     Last appt: 3/31/2022   Next appt: Visit date not found    Last OARRS:   RX Monitoring 8/12/2021   Periodic Controlled Substance Monitoring Possible medication side effects, risk of tolerance/dependence & alternative treatments discussed. ;No signs of potential drug abuse or diversion identified.

## 2022-05-02 RX ORDER — LOVASTATIN 40 MG/1
TABLET ORAL
Qty: 90 TABLET | Refills: 0 | Status: SHIPPED | OUTPATIENT
Start: 2022-05-02 | End: 2022-08-03

## 2022-05-02 NOTE — TELEPHONE ENCOUNTER
Medication:   Requested Prescriptions     Pending Prescriptions Disp Refills    lovastatin (MEVACOR) 40 MG tablet [Pharmacy Med Name: LOVASTATIN 40 MG TABLET] 90 tablet 0     Sig: TAKE 1 TABLET BY MOUTH EVERYDAY AT BEDTIME        Last Filled:  2/4/2021 90 tabs 0 refills     Patient Phone Number: [phone] (home)     Last appt: 3/31/2022   Next appt: Visit date not found    Last OARRS:   RX Monitoring 8/12/2021   Periodic Controlled Substance Monitoring Possible medication side effects, risk of tolerance/dependence & alternative treatments discussed. ;No signs of potential drug abuse or diversion identified.

## 2022-05-07 ENCOUNTER — HOSPITAL ENCOUNTER (OUTPATIENT)
Age: 87
Discharge: HOME | End: 2022-05-07
Payer: MEDICARE

## 2022-05-07 LAB
BACTERIA: NORMAL /HPF
BILIRUBIN URINE: NEGATIVE
BLOOD, URINE: NEGATIVE
CLARITY: CLEAR
COLOR: YELLOW
EPITHELIAL CELLS, UA: 4 /HPF (ref 0–5)
GLUCOSE URINE: NEGATIVE MG/DL
HYALINE CASTS: 2 /LPF (ref 0–8)
KETONES, URINE: NEGATIVE MG/DL
LEUKOCYTE ESTERASE, URINE: NEGATIVE
MICROSCOPIC EXAMINATION: NORMAL
NITRITE, URINE: NEGATIVE
PH UA: 6.5 (ref 5–8)
PROTEIN UA: NEGATIVE MG/DL
RBC UA: 0 /HPF (ref 0–4)
SPECIFIC GRAVITY UA: 1.02 (ref 1–1.03)
URINE TYPE: NORMAL
UROBILINOGEN, URINE: 0.2 E.U./DL
WBC UA: 1 /HPF (ref 0–5)

## 2022-05-07 PROCEDURE — 81001 URINALYSIS AUTO W/SCOPE: CPT

## 2022-05-07 PROCEDURE — 87086 URINE CULTURE/COLONY COUNT: CPT

## 2022-05-08 LAB — URINE CULTURE, ROUTINE: NORMAL

## 2022-05-09 ENCOUNTER — TELEMEDICINE (OUTPATIENT)
Dept: FAMILY MEDICINE CLINIC | Age: 87
End: 2022-05-09
Payer: MEDICARE

## 2022-05-09 ENCOUNTER — PATIENT MESSAGE (OUTPATIENT)
Dept: FAMILY MEDICINE CLINIC | Age: 87
End: 2022-05-09

## 2022-05-09 DIAGNOSIS — R10.9 ABDOMINAL PAIN, UNSPECIFIED ABDOMINAL LOCATION: Primary | ICD-10-CM

## 2022-05-09 DIAGNOSIS — R30.0 DYSURIA: ICD-10-CM

## 2022-05-09 DIAGNOSIS — Z87.440 HISTORY OF UTI: ICD-10-CM

## 2022-05-09 DIAGNOSIS — N18.31 STAGE 3A CHRONIC KIDNEY DISEASE (HCC): ICD-10-CM

## 2022-05-09 DIAGNOSIS — G89.29 CHRONIC BACK PAIN, UNSPECIFIED BACK LOCATION, UNSPECIFIED BACK PAIN LATERALITY: ICD-10-CM

## 2022-05-09 DIAGNOSIS — M54.9 CHRONIC BACK PAIN, UNSPECIFIED BACK LOCATION, UNSPECIFIED BACK PAIN LATERALITY: ICD-10-CM

## 2022-05-09 PROBLEM — N18.30 CHRONIC RENAL DISEASE, STAGE III (HCC): Status: ACTIVE | Noted: 2022-05-09

## 2022-05-09 PROCEDURE — G8420 CALC BMI NORM PARAMETERS: HCPCS | Performed by: FAMILY MEDICINE

## 2022-05-09 PROCEDURE — 1123F ACP DISCUSS/DSCN MKR DOCD: CPT | Performed by: FAMILY MEDICINE

## 2022-05-09 PROCEDURE — 1036F TOBACCO NON-USER: CPT | Performed by: FAMILY MEDICINE

## 2022-05-09 PROCEDURE — 99214 OFFICE O/P EST MOD 30 MIN: CPT | Performed by: FAMILY MEDICINE

## 2022-05-09 PROCEDURE — G8427 DOCREV CUR MEDS BY ELIG CLIN: HCPCS | Performed by: FAMILY MEDICINE

## 2022-05-09 PROCEDURE — 1090F PRES/ABSN URINE INCON ASSESS: CPT | Performed by: FAMILY MEDICINE

## 2022-05-09 PROCEDURE — 4040F PNEUMOC VAC/ADMIN/RCVD: CPT | Performed by: FAMILY MEDICINE

## 2022-05-09 RX ORDER — TRAMADOL HYDROCHLORIDE 50 MG/1
25-50 TABLET ORAL EVERY 8 HOURS PRN
Qty: 20 TABLET | Refills: 0 | Status: SHIPPED | OUTPATIENT
Start: 2022-05-09 | End: 2022-05-16

## 2022-05-09 RX ORDER — PROMETHAZINE HYDROCHLORIDE 25 MG/1
12.5-25 TABLET ORAL 3 TIMES DAILY PRN
Qty: 60 TABLET | Refills: 0 | Status: SHIPPED | OUTPATIENT
Start: 2022-05-09 | End: 2022-05-16

## 2022-05-09 RX ORDER — GABAPENTIN 100 MG/1
100-300 CAPSULE ORAL 2 TIMES DAILY PRN
Qty: 270 CAPSULE | Refills: 1 | Status: SHIPPED | OUTPATIENT
Start: 2022-05-09 | End: 2022-07-06 | Stop reason: SDUPTHER

## 2022-05-09 NOTE — PROGRESS NOTES
TELEHEALTH EVALUATION -- Audio/Visual (During BTRDQ-72 public health emergency)    Willam Trujillo   YOB: 1928    Date of Visit:  2022    Allergies   Allergen Reactions    Aleve [Naproxen]      Current Outpatient Medications on File Prior to Visit   Medication Sig Dispense Refill    lovastatin (MEVACOR) 40 MG tablet TAKE 1 TABLET BY MOUTH EVERYDAY AT BEDTIME 90 tablet 0    Docusate Sodium (DOCULASE PO) Take by mouth      XARELTO 15 MG TABS tablet TAKE 1 TABLET BY MOUTH EVERY DAY 90 tablet 1    dilTIAZem HCl ER Coated Beads (CARDIZEM LA) 240 MG TB24 TAKE 1 TABLET BY MOUTH EVERY DAY 90 tablet 3    furosemide (LASIX) 20 MG tablet Take 1 tablet by mouth as needed (edema) 90 tablet 1    famotidine (PEPCID) 20 MG tablet Take 1 tablet by mouth 2 times daily 60 tablet 11    polyethylene glycol (GLYCOLAX) 17 g packet Take 17 g by mouth daily as needed for Constipation      Blood Pressure KIT 1 each by Does not apply route once a week 1 kit 0    pantoprazole (PROTONIX) 20 MG tablet TAKE 1 TABLET BY MOUTH EVERY DAY BEFORE BREAKFAST (Patient not taking: Reported on 2022) 30 tablet 0    SODIUM CHLORIDE, EXTERNAL, 0.9 % SOLN Apply 10 mLs topically daily (Patient not taking: Reported on 3/31/2022) 1000 mL 0     No current facility-administered medications on file prior to visit. Wt Readings from Last 3 Encounters:   22 105 lb (47.6 kg)   21 113 lb (51.3 kg)   21 116 lb (52.6 kg)     BP Readings from Last 3 Encounters:   22 130/82   21 130/82   21 136/78          [facility] (:  4/10/1928) has requested to and consented to an audio/video evaluation for the concerns or medical issues discussed below. Chief Complaint   Patient presents with    Back Pain         [phone]   side , quality of life       HPI    Abdominal pain:  Across her whole belly. Has good and bad days. Yesterday was a bad day and she was in a lot of pain.   Going on for months. Has a good BM daily. No urinary symptoms. No fevers. No nausea or vomiting. Hasn't had an appetite for a long time. Has had ensure and boost before but it caused constipation. Takes miralax daily and colace daily.      Leg swelling:  Takes lasix prn. Doesn't take the lasix very often. Has chronic shortness of breath. No worse then it is usually.      Back pain/compression fracture on MRI: Patient does not care to intervene. Does not want to do DXA. Declined medication for osteoporosis.       Constipation:  Controlled with miralax.      Atrial Fibrillation: The patient is on xarelto for anticoagulation - reports she has always been on 15 mg and Cardizem for rate control. Hx of seeing Dr. Kalani Hanks last in 2017. Arturo Arredondo has been stable on this regimen a long time.      HTN: The patient is on Diltiazem. BP has been good at home.      HLD: She is on Mevacor.      CKD stage 3:      Ankle edema: the patient takes lasix as needed. Hasn't needed it in months.      GERD:  Takes pepcid BID. Has no symptoms.      Hx GI bleed 10/2020:  She had a colonoscopy which showed a polyp that was a tubular adenoma.  The bleeding was thought to be related to hemorrhoids - on annusol suppositories.  She was evaluated by general surgery in the hospital as she had bleeding the day after surgery. Edman Gut was initially held but resumed prior to discharge.       Hx of fall 3/20:  Slipped falling out of the shower.  H She declined additional intervention.  Granddaughter has purchase a bathtub seat now and has made arrangements so it is safer.      Hx of epistasix: hx of seeing ENT in 2019.  Given bactroban and saline gel to use.      Hx of ARF 2/2 sepsis- .       Hx of skin cancer: ? type     Hx of cataract surgery      SH:  6/15/20: she lives with her Madina is age 31(works for [employer] [facility] [employer]?)  and has lived with her since birth.  Patient's daughter used to live with them too but she  10 years ago. No other children.        Social History     Socioeconomic History    Marital status:      Spouse name: Not on file    Number of children: Not on file    Years of education: Not on file    Highest education level: Not on file   Occupational History    Not on file   Tobacco Use    Smoking status: Never Smoker    Smokeless tobacco: Never Used   Vaping Use    Vaping Use: Never used   Substance and Sexual Activity    Alcohol use: No    Drug use: Never    Sexual activity: Not on file   Other Topics Concern    Not on file   Social History Narrative    Not on file     Social Determinants of Health     Financial Resource Strain: Low Risk     Difficulty of Paying Living Expenses: Not hard at all   Food Insecurity: No Food Insecurity    Worried About Running Out of Food in the Last Year: Never true    Siri of Food in the Last Year: Never true   Transportation Needs: No Transportation Needs    Lack of Transportation (Medical): No    Lack of Transportation (Non-Medical): No   Physical Activity: Inactive    Days of Exercise per Week: 0 days    Minutes of Exercise per Session: 0 min   Stress: No Stress Concern Present    Feeling of Stress : Not at all   Social Connections:  Moderately Isolated    Frequency of Communication with Friends and Family: More than three times a week    Frequency of Social Gatherings with Friends and Family: More than three times a week    Attends Buddhist Services: More than 4 times per year    Active Member of [address] [place] [address] or Organizations: No    Attends Club or Organization Meetings: Never    Marital Status: Never    Intimate Partner Violence:     Fear of Current or Ex-Partner: Not on file    Emotionally Abused: Not on file    Physically Abused: Not on file    Sexually Abused: Not on file   Housing Stability: [address] Unable to Pay for Housing in the Last Year: No    Number of [place] in the Last Year: 1    Unstable Housing in the Last Year: No         Review of Systems  As documented in the HPI. Currently no complaints of CP or CIERA. Vital Signs: (As obtained by patient/caregiver or practitioner observation)    There were no vitals taken for this visit. Patient-Reported Vitals 5/9/2022   Patient-Reported Weight 100 lb   Patient-Reported Height 4 10   Patient-Reported Systolic -   Patient-Reported Diastolic -   Patient-Reported Pulse -   Patient-Reported Temperature -        Physical Exam  Constitutional:       General: She is not in acute distress. Appearance: Normal appearance. She is not ill-appearing. HENT:      Head: Normocephalic and atraumatic. Nose: Nose normal.   Pulmonary:      Effort: Pulmonary effort is normal. No respiratory distress. Neurological:      General: No focal deficit present. Mental Status: She is alert. Psychiatric:         Mood and Affect: Mood normal.         Behavior: Behavior normal.           Assessment/Plan     1. Chronic back pain, unspecified back location, unspecified back pain laterality  Pain 2/2 compression fractures? Discussed options. Patient prefers symptomatic care and does not want to do additional imaging to see a specialists. Tylenol or tramadol prn. Phenergan prn if tramadol makes her nauseated. Palliative care referral.     Controlled Substance Monitoring:    Acute and Chronic Pain Monitoring:   RX Monitoring 5/9/2022   Periodic Controlled Substance Monitoring Possible medication side effects, risk of tolerance/dependence & alternative treatments discussed. ;No signs of potential drug abuse or diversion identified.         - [facility] ([facility]  - traMADol (ULTRAM) 50 MG tablet; Take 0.5-1 tablets by mouth every 8 hours as needed for Pain for up to 7 days. Intended supply: 7 days. Take lowest dose possible to manage pain  Dispense: 20 tablet; Refill: 0  - gabapentin (NEURONTIN) 100 MG capsule;  Take 1-3 capsules by mouth 2 times daily as needed (back pain) for up to 90 days. Intended supply: 90 days  Dispense: 270 capsule; Refill: 1    2. Stage 3a chronic kidney disease (HCC)  Stable. Continue current regimen. Avoiding NSAIDS. 3. Abdominal pain, unspecified abdominal location  See above. Has CT planned. Pt would like to treat her symptoms and aside from the CT not continue to evaluate further.   - [facility] ([facility]) Palliative [facility] [place]  - traMADol (ULTRAM) 50 MG tablet; Take 0.5-1 tablets by mouth every 8 hours as needed for Pain for up to 7 days. Intended supply: 7 days. Take lowest dose possible to manage pain  Dispense: 20 tablet; Refill: 0  - promethazine (PHENERGAN) 25 MG tablet; Take 0.5-1 tablets by mouth 3 times daily as needed for Nausea  Dispense: 60 tablet; Refill: 0    4. History of UTI  Would like order in case has recurrent symptoms doing ok right now. - Culture, Urine; Future    5. Dysuria   See above. - Culture, Urine; Future      Discussed medications with patient, who voiced understanding of their use and indications. All questions answered. Return in about 1 month (around 6/9/2022) for Chronic conditions. Jair Dozier is being evaluated by a Virtual Visit (video visit) encounter to address concerns as mentioned above. A caregiver was present when appropriate. Due to this being a TeleHealth encounter (During [facility]- public health emergency), evaluation of the following organ systems was limited: Vitals/Constitutional/EENT/Resp/CV/GI//MS/Neuro/Skin/Heme-Lymph-Imm. Pursuant to the emergency declaration under the [facility], [facility] authority and the [employer] Act, this Virtual Visit was conducted with patient's (and/or legal guardian's) consent, to reduce the patient's risk of exposure to COVID-19 and provide necessary medical care.   The patient (and/or legal guardian) has also been advised to contact this office for worsening conditions or problems, and seek emergency medical treatment and/or call 911 if deemed necessary. The patient and her granddaughter Alondra Noel were present for the visit. Patient identification was verified at the start of the visit: Yes    Total time spent on this encounter: Not billed by time. Services were provided through a video synchronous discussion virtually to substitute for in-person clinic visit. Documentation was done using voice recognition dragon software. Efforts were made to ensure accuracy; however, inadvertent, unintentional computerized transcription errors may be present. --Omar Walls MD on 5/9/2022    An electronic signature was used to authenticate this note.

## 2022-05-10 ENCOUNTER — TELEPHONE (OUTPATIENT)
Dept: FAMILY MEDICINE CLINIC | Age: 87
End: 2022-05-10

## 2022-05-10 DIAGNOSIS — R10.9 ABDOMINAL PAIN, UNSPECIFIED ABDOMINAL LOCATION: Primary | ICD-10-CM

## 2022-05-10 NOTE — TELEPHONE ENCOUNTER
(blank) Referral Form [facility] in media and placed on Dr. Horan Splinter desk to sign

## 2022-05-10 NOTE — TELEPHONE ENCOUNTER
Aixa Kaye is calling from central scheduling [phone].   Patient needs a STAT order to have her Creatin labs done before her CAT Scan with and without contrast on her abdomin and pelvis

## 2022-05-11 ENCOUNTER — TELEPHONE (OUTPATIENT)
Dept: FAMILY MEDICINE CLINIC | Age: 87
End: 2022-05-11

## 2022-05-11 ENCOUNTER — HOSPITAL ENCOUNTER (OUTPATIENT)
Dept: CT IMAGING | Age: 87
Discharge: HOME | End: 2022-05-11
Payer: MEDICARE

## 2022-05-11 DIAGNOSIS — R10.84 GENERALIZED ABDOMINAL PAIN: ICD-10-CM

## 2022-05-11 LAB
CREAT SERPL-MCNC: 1.4 MG/DL (ref 0.6–1.2)
GFR AFRICAN AMERICAN: 42
GFR NON-AFRICAN AMERICAN: 35

## 2022-05-11 PROCEDURE — 36415 COLL VENOUS BLD VENIPUNCTURE: CPT

## 2022-05-11 PROCEDURE — 82565 ASSAY OF CREATININE: CPT

## 2022-05-11 PROCEDURE — 74176 CT ABD & PELVIS W/O CONTRAST: CPT

## 2022-05-11 NOTE — TELEPHONE ENCOUNTER
Lab called as patient's creatinne lab returned, showed likely dehydration and low GFR and otherwise. Lab spoke to both Mary and Dr Lala Mead, and are going to proceed to do the scan without contrast today to see what it shows, and can go back for with contrast and hydration if needed. No further action is needed at this time.

## 2022-05-13 ENCOUNTER — TELEPHONE (OUTPATIENT)
Dept: FAMILY MEDICINE CLINIC | Age: 87
End: 2022-05-13

## 2022-05-13 NOTE — TELEPHONE ENCOUNTER
promethazine (PHENERGAN) 25 MG tablet [[id number]]      Please initiate prior authorization on the above medication

## 2022-05-16 ENCOUNTER — TELEPHONE (OUTPATIENT)
Dept: FAMILY MEDICINE CLINIC | Age: 87
End: 2022-05-16

## 2022-05-16 RX ORDER — RIVAROXABAN 15 MG/1
TABLET, FILM COATED ORAL
Qty: 90 TABLET | Refills: 1 | Status: SHIPPED | OUTPATIENT
Start: 2022-05-16

## 2022-05-16 NOTE — TELEPHONE ENCOUNTER
Dx sent over for palliative care is not acceptable, they also do not treat for pain management     They are also unable to find pt with in [facility] in care everywhere  please call Emma Guzmán  [phone]

## 2022-05-16 NOTE — TELEPHONE ENCOUNTER
Medication:   Requested Prescriptions     Pending Prescriptions Disp Refills    XARELTO 15 MG TABS tablet [Pharmacy Med Name: Jhony Moore 15 MG TABLET] 90 tablet 1     Sig: TAKE 1 TABLET BY MOUTH EVERY DAY        Last Filled:  11/8/2021 90 tabs 1 refill     Patient Phone Number: [phone] (home)     Last appt: 5/9/2022   Next appt: Visit date not found    Last OARRS:   RX Monitoring 5/9/2022   Periodic Controlled Substance Monitoring Possible medication side effects, risk of tolerance/dependence & alternative treatments discussed. ;No signs of potential drug abuse or diversion identified.

## 2022-05-31 RX ORDER — PANTOPRAZOLE SODIUM 20 MG/1
TABLET, DELAYED RELEASE ORAL
Qty: 30 TABLET | Refills: 0 | Status: SHIPPED | OUTPATIENT
Start: 2022-05-31 | End: 2022-07-05

## 2022-05-31 NOTE — TELEPHONE ENCOUNTER
Medication:   Requested Prescriptions     Pending Prescriptions Disp Refills    pantoprazole (PROTONIX) 20 MG tablet [Pharmacy Med Name: PANTOPRAZOLE SOD DR 20 MG TAB] 30 tablet 0     Sig: TAKE 1 TABLET BY MOUTH EVERY DAY BEFORE BREAKFAST     Last Filled: 4.25.22 #30 refills 0    Last appt: 5/9/2022   Next appt: Visit date not found    Last OARRS:   RX Monitoring 5/9/2022   Periodic Controlled Substance Monitoring Possible medication side effects, risk of tolerance/dependence & alternative treatments discussed. ;No signs of potential drug abuse or diversion identified.

## 2022-06-10 ENCOUNTER — TELEPHONE (OUTPATIENT)
Dept: FAMILY MEDICINE CLINIC | Age: 87
End: 2022-06-10

## 2022-06-10 NOTE — TELEPHONE ENCOUNTER
Palliative care asking for Dr Tashia Eden to prescribe predisone 5mg every morning to help relieve pain, help with appetite and energy. Something has gotten more painful with her back, and a mobile x-ray has been ordered, more details to come. Believe this med will help with quality of care. Please review and advise, [facility] [address].

## 2022-06-13 RX ORDER — PREDNISONE 1 MG/1
5 TABLET ORAL DAILY
Qty: 30 TABLET | Refills: 5 | Status: SHIPPED | OUTPATIENT
Start: 2022-06-13 | End: 2022-07-06 | Stop reason: SDUPTHER

## 2022-06-13 NOTE — TELEPHONE ENCOUNTER
[facility] will send it in. Please call tristan Mccoy and let her know and make sure she is ok with it.

## 2022-06-15 ENCOUNTER — TELEPHONE (OUTPATIENT)
Dept: FAMILY MEDICINE CLINIC | Age: 87
End: 2022-06-15

## 2022-06-15 NOTE — TELEPHONE ENCOUNTER
Has she tried just tylenol? Does that help?  Can take up to 1000mg q6 PRN  Avoid NSAIDS  Ultram caused side effects so anything stronger than tylenol may also

## 2022-06-15 NOTE — TELEPHONE ENCOUNTER
Gricel from [address] called, patients Xrays came back with no fractures, but her pain is not controlled. Patient granddaughter said she had to stop the Tramadol, it was causing hallucinations. She is on day 3 of Prednisone, and taking the Gabapentin 300 mg in a.m. and 300-400 mg in the evening. Asking for a different medication to help the pain control.  Please advise        [facility] [address]

## 2022-06-16 NOTE — TELEPHONE ENCOUNTER
When granddaughter calls back please inform of Dr. Sukhdev Todd note. Also if would like to try stronger medication like oxycodone we can although should do a VV again to get started- can do Tuesday or wants to do it today can do a VV today.

## 2022-06-17 NOTE — TELEPHONE ENCOUNTER
Medication:   Requested Prescriptions     Pending Prescriptions Disp Refills    famotidine (PEPCID) 20 MG tablet 60 tablet 11     Sig: Take 1 tablet by mouth 2 times daily        Last Filled:  1/13/2021    Patient Phone Number: [phone] (home)     Last appt: 5/9/2022   Next appt: Visit date not found    Last OARRS:   RX Monitoring 5/9/2022   Periodic Controlled Substance Monitoring Possible medication side effects, risk of tolerance/dependence & alternative treatments discussed. ;No signs of potential drug abuse or diversion identified.

## 2022-06-20 RX ORDER — FAMOTIDINE 20 MG/1
20 TABLET, FILM COATED ORAL 2 TIMES DAILY
Qty: 60 TABLET | Refills: 11 | Status: SHIPPED | OUTPATIENT
Start: 2022-06-20

## 2022-06-23 ENCOUNTER — HOSPITAL ENCOUNTER (OUTPATIENT)
Age: 87
Setting detail: SPECIMEN
Discharge: HOME | End: 2022-06-23
Payer: MEDICARE

## 2022-06-23 LAB
BACTERIA: ABNORMAL /HPF
BILIRUBIN URINE: NEGATIVE
BLOOD, URINE: NEGATIVE
CLARITY: CLEAR
COLOR: YELLOW
EPITHELIAL CELLS, UA: 13 /HPF (ref 0–5)
GLUCOSE URINE: NEGATIVE MG/DL
HYALINE CASTS: 6 /LPF (ref 0–8)
KETONES, URINE: NEGATIVE MG/DL
LEUKOCYTE ESTERASE, URINE: ABNORMAL
MICROSCOPIC EXAMINATION: YES
NITRITE, URINE: NEGATIVE
PH UA: 5.5 (ref 5–8)
PROTEIN UA: NEGATIVE MG/DL
RBC UA: 1 /HPF (ref 0–4)
SPECIFIC GRAVITY UA: 1.01 (ref 1–1.03)
URINE TYPE: ABNORMAL
UROBILINOGEN, URINE: 1 E.U./DL
WBC UA: 26 /HPF (ref 0–5)

## 2022-06-23 PROCEDURE — 81001 URINALYSIS AUTO W/SCOPE: CPT

## 2022-06-23 PROCEDURE — 87086 URINE CULTURE/COLONY COUNT: CPT

## 2022-06-25 LAB — URINE CULTURE, ROUTINE: NORMAL

## 2022-06-29 NOTE — TELEPHONE ENCOUNTER
Medication:   Requested Prescriptions     Pending Prescriptions Disp Refills    furosemide (LASIX) 20 MG tablet 90 tablet 1     Sig: Take 1 tablet by mouth as needed (edema)     Last Filled:  6.8.21 #90 refills 1  Last appt: 5/9/2022   Next appt: Visit date not found    Last OARRS:   RX Monitoring 5/9/2022   Periodic Controlled Substance Monitoring Possible medication side effects, risk of tolerance/dependence & alternative treatments discussed. ;No signs of potential drug abuse or diversion identified.

## 2022-06-30 ENCOUNTER — CARE COORDINATION (OUTPATIENT)
Dept: CARE COORDINATION | Age: 87
End: 2022-06-30

## 2022-06-30 NOTE — TELEPHONE ENCOUNTER
Please call granddaughter and see why they want to increase to twice a day and confirm that is what they want.

## 2022-07-01 RX ORDER — FUROSEMIDE 20 MG/1
20 TABLET ORAL 2 TIMES DAILY
Qty: 180 TABLET | Refills: 1 | Status: SHIPPED | OUTPATIENT
Start: 2022-07-01

## 2022-07-05 RX ORDER — LORAZEPAM 0.5 MG/1
0.25 TABLET ORAL EVERY 6 HOURS PRN
COMMUNITY
Start: 2022-06-10 | End: 2022-07-10

## 2022-07-05 RX ORDER — PANTOPRAZOLE SODIUM 20 MG/1
TABLET, DELAYED RELEASE ORAL
Qty: 90 TABLET | Refills: 3 | Status: SHIPPED | OUTPATIENT
Start: 2022-07-05

## 2022-07-05 RX ORDER — FUROSEMIDE 20 MG/1
20 TABLET ORAL PRN
Qty: 90 TABLET | Refills: 1 | OUTPATIENT
Start: 2022-07-05

## 2022-07-05 RX ORDER — NITROFURANTOIN 25; 75 MG/1; MG/1
100 CAPSULE ORAL 2 TIMES DAILY PRN
COMMUNITY
Start: 2022-06-10

## 2022-07-05 NOTE — TELEPHONE ENCOUNTER
Medication:   Requested Prescriptions     Pending Prescriptions Disp Refills    furosemide (LASIX) 20 MG tablet [Pharmacy Med Name: FUROSEMIDE 20 MG TABLET] 90 tablet 1     Sig: TAKE 1 TABLET BY MOUTH AS NEEDED (EDEMA)        Last Filled:  7/1/22 #180 w/1 RF     Patient Phone Number: [phone] (home)     Last appt: 5/9/2022   Next appt: 7/3/2022    Last OARRS:   RX Monitoring 5/9/2022   Periodic Controlled Substance Monitoring Possible medication side effects, risk of tolerance/dependence & alternative treatments discussed. ;No signs of potential drug abuse or diversion identified.

## 2022-07-05 NOTE — TELEPHONE ENCOUNTER
Medication:   Requested Prescriptions     Pending Prescriptions Disp Refills    pantoprazole (PROTONIX) 20 MG tablet [Pharmacy Med Name: PANTOPRAZOLE SOD DR 20 MG TAB] 30 tablet 0     Sig: TAKE 1 TABLET BY MOUTH EVERY DAY BEFORE BREAKFAST        Last Filled:  5/31/2022 #30 w/o RF     Patient Phone Number: [phone] (home)     Last appt: 5/9/2022   Next appt: Visit date not found    Last OARRS:   RX Monitoring 5/9/2022   Periodic Controlled Substance Monitoring Possible medication side effects, risk of tolerance/dependence & alternative treatments discussed. ;No signs of potential drug abuse or diversion identified.

## 2022-07-06 ENCOUNTER — TELEPHONE (OUTPATIENT)
Dept: FAMILY MEDICINE CLINIC | Age: 87
End: 2022-07-06

## 2022-07-06 ENCOUNTER — CARE COORDINATION (OUTPATIENT)
Dept: CARE COORDINATION | Age: 87
End: 2022-07-06

## 2022-07-06 DIAGNOSIS — G89.29 CHRONIC BACK PAIN, UNSPECIFIED BACK LOCATION, UNSPECIFIED BACK PAIN LATERALITY: ICD-10-CM

## 2022-07-06 DIAGNOSIS — M54.9 CHRONIC BACK PAIN, UNSPECIFIED BACK LOCATION, UNSPECIFIED BACK PAIN LATERALITY: ICD-10-CM

## 2022-07-06 RX ORDER — TRAMADOL HYDROCHLORIDE 50 MG/1
50 TABLET ORAL EVERY 8 HOURS PRN
COMMUNITY

## 2022-07-06 RX ORDER — PREDNISONE 10 MG/1
10 TABLET ORAL DAILY
Qty: 30 TABLET | Refills: 5 | Status: SHIPPED | OUTPATIENT
Start: 2022-07-06 | End: 2022-08-05

## 2022-07-06 RX ORDER — GABAPENTIN 100 MG/1
100-300 CAPSULE ORAL 2 TIMES DAILY PRN
Qty: 180 CAPSULE | Refills: 2 | Status: SHIPPED | OUTPATIENT
Start: 2022-07-06 | End: 2022-07-11 | Stop reason: SDUPTHER

## 2022-07-06 SDOH — ECONOMIC STABILITY: INCOME INSECURITY: HOW HARD IS IT FOR YOU TO PAY FOR THE VERY BASICS LIKE FOOD, HOUSING, MEDICAL CARE, AND HEATING?: NOT HARD AT ALL

## 2022-07-06 SDOH — SOCIAL STABILITY: SOCIAL NETWORK: HOW OFTEN DO YOU ATTEND CHURCH OR RELIGIOUS SERVICES?: MORE THAN 4 TIMES PER YEAR

## 2022-07-06 SDOH — HEALTH STABILITY: MENTAL HEALTH
STRESS IS WHEN SOMEONE FEELS TENSE, NERVOUS, ANXIOUS, OR CAN'T SLEEP AT NIGHT BECAUSE THEIR MIND IS TROUBLED. HOW STRESSED ARE YOU?: ONLY A LITTLE

## 2022-07-06 SDOH — ECONOMIC STABILITY: FOOD INSECURITY: WITHIN THE PAST 12 MONTHS, YOU WORRIED THAT YOUR FOOD WOULD RUN OUT BEFORE YOU GOT MONEY TO BUY MORE.: NEVER TRUE

## 2022-07-06 SDOH — ECONOMIC STABILITY: FOOD INSECURITY: WITHIN THE PAST 12 MONTHS, THE FOOD YOU BOUGHT JUST DIDN'T LAST AND YOU DIDN'T HAVE MONEY TO GET MORE.: NEVER TRUE

## 2022-07-06 SDOH — SOCIAL STABILITY: SOCIAL NETWORK: ARE YOU MARRIED, WIDOWED, DIVORCED, SEPARATED, NEVER MARRIED, OR LIVING WITH A PARTNER?: DIVORCED

## 2022-07-06 SDOH — HEALTH STABILITY: MENTAL HEALTH: HOW OFTEN DO YOU HAVE A DRINK CONTAINING ALCOHOL?: NEVER

## 2022-07-06 SDOH — ECONOMIC STABILITY: HOUSING INSECURITY: IN THE LAST 12 MONTHS, HOW MANY PLACES HAVE YOU LIVED?: 1

## 2022-07-06 SDOH — ECONOMIC STABILITY: INCOME INSECURITY: IN THE LAST 12 MONTHS, WAS THERE A TIME WHEN YOU WERE NOT ABLE TO PAY THE MORTGAGE OR RENT ON TIME?: NO

## 2022-07-06 SDOH — SOCIAL STABILITY: SOCIAL NETWORK: HOW OFTEN DO YOU ATTENT MEETINGS OF THE CLUB OR ORGANIZATION YOU BELONG TO?: NEVER

## 2022-07-06 SDOH — HEALTH STABILITY: PHYSICAL HEALTH: ON AVERAGE, HOW MANY DAYS PER WEEK DO YOU ENGAGE IN MODERATE TO STRENUOUS EXERCISE (LIKE A BRISK WALK)?: 0 DAYS

## 2022-07-06 SDOH — SOCIAL STABILITY: SOCIAL NETWORK: HOW OFTEN DO YOU GET TOGETHER WITH FRIENDS OR RELATIVES?: MORE THAN THREE TIMES A WEEK

## 2022-07-06 NOTE — TELEPHONE ENCOUNTER
Nurse is asking for patients Prednisone 5 mg. To be increased 10 mg for chronic back pain, with acute increased pain, and also appetite issues.     Also to order PT/OT home care to increase patients mobility at home, please advise

## 2022-07-07 ENCOUNTER — CARE COORDINATION (OUTPATIENT)
Dept: CARE COORDINATION | Age: 87
End: 2022-07-07

## 2022-07-07 NOTE — CARE COORDINATION
Confirmed with [facility] they do have the orders for PT/OT and will be reaching out to the patient today or tomorrow 7/8.

## 2022-07-11 ENCOUNTER — TELEPHONE (OUTPATIENT)
Dept: FAMILY MEDICINE CLINIC | Age: 87
End: 2022-07-11

## 2022-07-11 DIAGNOSIS — G89.29 CHRONIC BACK PAIN, UNSPECIFIED BACK LOCATION, UNSPECIFIED BACK PAIN LATERALITY: ICD-10-CM

## 2022-07-11 DIAGNOSIS — M54.9 CHRONIC BACK PAIN, UNSPECIFIED BACK LOCATION, UNSPECIFIED BACK PAIN LATERALITY: ICD-10-CM

## 2022-07-11 RX ORDER — GABAPENTIN 100 MG/1
100-400 CAPSULE ORAL 2 TIMES DAILY PRN
Qty: 240 CAPSULE | Refills: 0 | Status: SHIPPED | OUTPATIENT
Start: 2022-07-11 | End: 2022-09-30 | Stop reason: SDUPTHER

## 2022-07-11 NOTE — TELEPHONE ENCOUNTER
[facility] from home health notified, as well as her grand daughter were notified that the medication was sent in,

## 2022-07-11 NOTE — TELEPHONE ENCOUNTER
[facility] from [address] called to ask for confirmation that the patient's provider will sign off on the necessary Physical Therapy and Occupational Therapy orders. Additionally, the patient's daughter was checking in on the status of the patient's gabapentin refill and requesting a change. She is requesting the gabapentin script be changed from 1-4 capsules twice daily. It seems that the pharmacy may not be filling the previous script because the patient is not technically due according to previous instructions. However, the patient has been taking up to 4 tablets for pain.

## 2022-07-20 ENCOUNTER — TELEPHONE (OUTPATIENT)
Dept: FAMILY MEDICINE CLINIC | Age: 87
End: 2022-07-20

## 2022-07-27 ENCOUNTER — CARE COORDINATION (OUTPATIENT)
Dept: CARE COORDINATION | Age: 87
End: 2022-07-27

## 2022-07-27 NOTE — CARE COORDINATION
Ambulatory Care Coordination Note  7/27/2022    ACC: Belem Hernadez RN    Summary Note:   Outbound call made to patient's granddaughter Santo Adrian ([phone]) to follow up on medications, Home Health PT, needs and concerns. Santo Adrian said that the patient is doing well. Patient has all her medications. Patient is receiving home physical therapy in the home. Santo Adrian said that they are currently researching the possibility of respite care if it is needed. ACM explained that when they find a facility, they are interested on to ask for the admission liaison. The admission liaison will be able to let them know what you would need to have a respite stay placement. Santo Adrian voiced understanding. Santo Adrian denies having any other questions are concerns currently. Santo Adrian has ACM's contact information and is encouraged to call with any questions or concerns. No further outreach scheduled with this ACM due to goals met and graduating from care coordination. Episode of Care resolved. ACM encouraged Santo Adrian to reach out to patient's PCP if future needs arise. Belem Hernadez RN  Ambulatory Care Manager  [phone] [address]  Family Medicine  [facility]  [facility]      Lab Results       None            Care Coordination Interventions    Referral from Primary Care Provider: No  Suggested Interventions and [address] [place], [place] [address]: In Process (Comment: Palliative care nurse asking for Home Health PT/OT eval for increased mobility at home)          Goals Addressed                   This Visit's Progress     COMPLETED: Conditions and Symptoms        I will notify my provider of any barriers to my plan of care. I will notify my provider of any symptoms that indicate a worsening of my condition.     Barriers: overwhelmed by complexity of regimen  Plan for overcoming my barriers: Jean Mariebrandy Adrian (medical POA) will reach out to patient's provider and or ACM traMADol (ULTRAM) 50 MG tablet Take 50 mg by mouth every 8 hours as needed for Pain. Mark Hargrove patient's granddaughter said patient hardly ever takes the Tramadol d/t causes hallucinations. Historical Provider, MD   predniSONE (DELTASONE) 10 MG tablet Take 1 tablet by mouth daily 7/6/22 8/5/22  Nancyibmarjan Beltrán MD   pantoprazole (PROTONIX) 20 MG tablet TAKE 1 TABLET BY MOUTH EVERY DAY BEFORE BREAKFAST  Patient taking differently: Granddaughters said patient will take Protonix or Pepcid daily. Does not take both at the same time. 7/5/22   Kevin Sage MD   nitrofurantoin, macrocrystal-monohydrate, (MACROBID) 100 MG capsule Take 100 mg by mouth 2 times daily as needed For UTI 6/10/22   Historical Provider, MD   furosemide (LASIX) 20 MG tablet Take 1 tablet by mouth 2 times daily  Patient taking differently: Take 20 mg by mouth as needed 1 to 2 times as day as needed 7/1/22   Kevin Sage MD   famotidine (PEPCID) 20 MG tablet Take 1 tablet by mouth 2 times daily  Patient taking differently: Take 20 mg by mouth daily as needed Granddaughters said patient will take Protonix or Pepcid daily. Does not take both at the same time.  6/20/22   Kevin Sage MD   XARELTO 15 MG TABS tablet TAKE 1 TABLET BY MOUTH EVERY DAY 5/16/22   Kevin Sage MD   lovastatin (MEVACOR) 40 MG tablet TAKE 1 TABLET BY MOUTH EVERYDAY AT BEDTIME  Patient taking differently: every morning  5/2/22   Kevin Sage MD   Docusate Sodium (DOCULASE PO) Take by mouth    Historical Provider, MD   dilTIAZem HCl ER Coated Beads (CARDIZEM LA) 240 MG TB24 TAKE 1 TABLET BY MOUTH EVERY DAY 7/9/21   Liam Ma MD   SODIUM CHLORIDE, EXTERNAL, 0.9 % SOLN Apply 10 mLs topically daily  Patient not taking: Reported on 3/31/2022 2/4/21   Lonzell Roll, DO   polyethylene glycol (GLYCOLAX) 17 g packet Take 17 g by mouth daily as needed for Constipation  Patient not taking: Reported on 7/6/2022    Historical Provider, MD   Blood

## 2022-08-03 RX ORDER — LOVASTATIN 40 MG/1
TABLET ORAL
Qty: 90 TABLET | Refills: 0 | Status: SHIPPED | OUTPATIENT
Start: 2022-08-03

## 2022-08-03 NOTE — TELEPHONE ENCOUNTER
Medication:   Requested Prescriptions     Pending Prescriptions Disp Refills    lovastatin (MEVACOR) 40 MG tablet [Pharmacy Med Name: LOVASTATIN 40 MG TABLET] 90 tablet 0     Sig: TAKE 1 TABLET BY MOUTH EVERYDAY AT BEDTIME        Last Filled:  5/2/2022 90 tabs 0 refills     Patient Phone Number: [phone] (home)     Last appt: 5/9/2022   Next appt: Visit date not found    Last OARRS:   RX Monitoring 5/9/2022   Periodic Controlled Substance Monitoring Possible medication side effects, risk of tolerance/dependence & alternative treatments discussed. ;No signs of potential drug abuse or diversion identified.

## 2022-08-08 NOTE — TELEPHONE ENCOUNTER
Medication:   Requested Prescriptions     Pending Prescriptions Disp Refills    dilTIAZem HCl ER Coated Beads (CARDIZEM LA) 240 MG TB24 [Pharmacy Med Name: CARDIZEM  MG TABLET] 90 tablet 3     Sig: TAKE 1 TABLET BY MOUTH EVERY DAY        Last Filled:  7/9/2021 90 tabs 3 refills     Patient Phone Number: [phone] (home)     Last appt: 5/9/2022   Next appt: Visit date not found    Last OARRS:   RX Monitoring 5/9/2022   Periodic Controlled Substance Monitoring Possible medication side effects, risk of tolerance/dependence & alternative treatments discussed. ;No signs of potential drug abuse or diversion identified.

## 2022-08-09 RX ORDER — DILTIAZEM HYDROCHLORIDE EXTENDED-RELEASE TABLETS 240 MG/1
TABLET, EXTENDED RELEASE ORAL
Qty: 90 TABLET | Refills: 0 | Status: SHIPPED | OUTPATIENT
Start: 2022-08-09 | End: 2022-10-03

## 2022-08-09 NOTE — TELEPHONE ENCOUNTER
Please let patient's granddaughter know that the diltiazem and lovastatin can interact leading to increase risk of side effects from the lovastatin. She has been on both and doing ok so we can leave it or discuss alternatives or go off the lovastatin.

## 2022-08-10 NOTE — TELEPHONE ENCOUNTER
Patients granddaughter reports she has been taking both for years and is confident in their decision to continue both. She also stated Jana Burnett recently has an appointment on 8/5/2022 with [address] [place] [address] and wanting to know why she has to come in here?

## 2022-08-28 ENCOUNTER — APPOINTMENT (OUTPATIENT)
Dept: CT IMAGING | Age: 87
End: 2022-08-28
Payer: MEDICARE

## 2022-08-28 ENCOUNTER — HOSPITAL ENCOUNTER (EMERGENCY)
Age: 87
Discharge: HOME | End: 2022-08-28
Payer: MEDICARE

## 2022-08-28 VITALS
SYSTOLIC BLOOD PRESSURE: 141 MMHG | DIASTOLIC BLOOD PRESSURE: 77 MMHG | HEART RATE: 82 BPM | HEIGHT: 57 IN | TEMPERATURE: 97.5 F | OXYGEN SATURATION: 96 % | WEIGHT: 93 LBS | RESPIRATION RATE: 16 BRPM | BODY MASS INDEX: 20.06 KG/M2
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
## 2022-08-28 PROCEDURE — 72125 CT NECK SPINE W/O DYE: CPT

## 2022-08-28 PROCEDURE — 70450 CT HEAD/BRAIN W/O DYE: CPT

## 2022-08-28 PROCEDURE — 99284 EMERGENCY DEPT VISIT MOD MDM: CPT

## 2022-08-28 ASSESSMENT — ENCOUNTER SYMPTOMS
DIARRHEA: 0
ABDOMINAL PAIN: 0
COUGH: 0
CONSTIPATION: 0
SORE THROAT: 0
EYE PAIN: 0
BACK PAIN: 0
SHORTNESS OF BREATH: 0
RHINORRHEA: 0
VOMITING: 0
NAUSEA: 0

## 2022-08-28 ASSESSMENT — PAIN - FUNCTIONAL ASSESSMENT: PAIN_FUNCTIONAL_ASSESSMENT: 0-10

## 2022-08-28 ASSESSMENT — PAIN SCALES - GENERAL: PAINLEVEL_OUTOF10: 0

## 2022-08-28 NOTE — ED PROVIDER NOTES
[facility]        Pt Name: Isela Herman  MRN: [id number]  [place] 4/10/1928  Date of evaluation: 8/28/2022  Provider: MARLIN Garcia  PCP: Magen Kimbrough MD  Note Started: 11:26 AM EDT       SASCHA. I have evaluated this patient. My supervising physician was available for consultation. CHIEF COMPLAINT       Chief Complaint   Patient presents with    Fall     Fall unwitnessed. Not sure how she fell no loc. From home by [address]   (Location, Timing/Onset, Context/Setting, Quality, Duration, Modifying Factors, Severity, Associated Signs and Symptoms)  Note limiting factors. Chief Complaint: Josefina Narayan is a 80 y.o. female who presents to the emergency room due to numbness for approximately 10:00 this morning. Patient's niece had her  look for the patient after the patient called about her falling. He states that he found her on the ground sitting up in no acute distress. Patient is on anticoagulation medication and has a history of A. Fib on on Xarelto. Patient is not having any acute distress at this time. Patient denies any pain. Patient did sustain a skin tear over the right tricep area. This was dressed by EMS prior to arrival.    Nursing Notes were all reviewed and agreed with or any disagreements were addressed in the HPI. REVIEW OF SYSTEMS    (2-9 systems for level 4, 10 or more for level 5)     Review of Systems   Constitutional:  Negative for chills, diaphoresis and fever. HENT:  Negative for congestion, rhinorrhea and sore throat. Eyes:  Negative for pain and visual disturbance. Respiratory:  Negative for cough and shortness of breath. Cardiovascular:  Negative for chest pain and leg swelling. Gastrointestinal:  Negative for abdominal pain, constipation, diarrhea, nausea and vomiting.    Genitourinary:  Negative for difficulty urinating, dysuria and frequency. Musculoskeletal:  Negative for back pain and neck pain. Skin:  Positive for wound. Negative for rash. Neurological:  Negative for dizziness and light-headedness. Positives and Pertinent negatives as per HPI. Except as noted above in the ROS, all other systems were reviewed and negative. PAST MEDICAL HISTORY     Past Medical History:   Diagnosis Date    Atrial fibrillation [facility])     History of skin cancer     Hyperlipidemia     Hypertension          SURGICAL HISTORY     Past Surgical History:   Procedure Laterality Date    COLONOSCOPY N/A 9/28/2020    COLONOSCOPY POLYPECTOMY SNARE/COLD BIOPSY performed by Kenneth Duke MD at [address]  9/28/2020    COLONOSCOPY SUBMUCOSAL/BOTOX INJECTION performed by Kenneth Duke MD at [facility]       Discharge Medication List as of 8/28/2022  1:09 PM        CONTINUE these medications which have NOT CHANGED    Details   dilTIAZem HCl ER Coated Beads (CARDIZEM LA) 240 MG TB24 TAKE 1 TABLET BY MOUTH EVERY DAY, Disp-90 tablet, R-0Normal      lovastatin (MEVACOR) 40 MG tablet TAKE 1 TABLET BY MOUTH EVERYDAY AT BEDTIME, Disp-90 tablet, R-0Normal      gabapentin (NEURONTIN) 100 MG capsule Take 1-4 capsules by mouth 2 times daily as needed (back pain) for up to 30 days. , Disp-240 capsule, R-0Normal      traMADol (ULTRAM) 50 MG tablet Take 50 mg by mouth every 8 hours as needed for Pain. Kirk patient's granddaughter said patient hardly ever takes the Tramadol d/t causes hallucinations. Historical Med      pantoprazole (PROTONIX) 20 MG tablet TAKE 1 TABLET BY MOUTH EVERY DAY BEFORE BREAKFAST, Disp-90 tablet, R-3Normal      nitrofurantoin, macrocrystal-monohydrate, (MACROBID) 100 MG capsule Take 100 mg by mouth 2 times daily as needed For UTIHistorical Med      furosemide (LASIX) 20 MG tablet Take 1 tablet by mouth 2 times daily, Disp-180 tablet, R-1Normal      famotidine (PEPCID) 20 MG tablet Take 1 tablet by mouth 2 times daily, Disp-60 tablet, R-11Normal      XARELTO 15 MG TABS tablet TAKE 1 TABLET BY MOUTH EVERY DAY, Disp-90 tablet, R-1Normal      Docusate Sodium (DOCULASE PO) Take by mouthHistorical Med      SODIUM CHLORIDE, EXTERNAL, 0.9 % SOLN Apply 10 mLs topically daily, Disp-1000 mL, R-0Normal      polyethylene glycol (GLYCOLAX) 17 g packet Take 17 g by mouth daily as needed for ConstipationHistorical Med      Blood Pressure KIT WEEKLY Starting Tue 9/15/2020, Disp-1 kit,R-0, Normal               ALLERGIES     Tramadol-acetaminophen and Aleve [naproxen]    FAMILYHISTORY       Family History   Problem Relation Age of Onset    Heart Disease Neg Hx           SOCIAL HISTORY       Social History     Tobacco Use    Smoking status: Never    Smokeless tobacco: Never   Vaping Use    Vaping Use: Never used   Substance Use Topics    Alcohol use: No    Drug use: Never       SCREENINGS   NIH Stroke Scale  Interval: Baseline  Level of Consciousness (1a): Alert  LOC Questions (1b): Answers both correctly  LOC Commands (1c): Performs both tasks correctly  Best Gaze (2): Normal  Visual (3): No visual loss  Facial Palsy (4): Normal symmetrical movement  Motor Arm, Left (5a): No drift  Motor Arm, Right (5b): No drift  Motor Leg, Left (6a): No drift  Motor Leg, Right (6b): No drift  Limb Ataxia (7): Absent  Sensory (8): Normal  Best Language (9): No aphasia  Dysarthria (10): Normal  Extinction and Inattention (11): No abnormality  Total: 0Glasgow Coma Scale  Eye Opening: Spontaneous  Best Verbal Response: Oriented  Best Motor Response: Obeys commands  Bella Coma Scale Score: 15        PHYSICAL EXAM    (up to 7 for level 4, 8 or more for level 5)     ED Triage Vitals [08/28/22 1057]   BP Temp Temp Source Heart Rate Resp SpO2 Height Weight   135/87 97.5 °F (36.4 °C) Oral 82 16 97 % 4' 9\" (1.448 m) 93 lb (42.2 kg)       Physical Exam  Vitals and nursing note reviewed. Constitutional:       General: She is not in acute distress. Appearance: She is normal weight. She is not ill-appearing or toxic-appearing. HENT:      Head: Normocephalic. Mouth/Throat:      Mouth: Mucous membranes are moist.      Pharynx: Oropharynx is clear. No oropharyngeal exudate or posterior oropharyngeal erythema. Eyes:      General: No scleral icterus. Right eye: No discharge. Left eye: No discharge. Extraocular Movements: Extraocular movements intact. Conjunctiva/sclera: Conjunctivae normal.      Pupils: Pupils are equal, round, and reactive to light. Cardiovascular:      Rate and Rhythm: Normal rate and regular rhythm. Pulses: Normal pulses. Heart sounds: Normal heart sounds. Pulmonary:      Effort: Pulmonary effort is normal. No respiratory distress. Breath sounds: Normal breath sounds. No stridor. No wheezing. Abdominal:      General: Bowel sounds are normal.   Musculoskeletal:      Cervical back: Normal range of motion and neck supple. Skin:     Comments: Skin tear right tricep no areas to repair with suture. Neurological:      Mental Status: She is alert and oriented to person, place, and time. Cranial Nerves: No cranial nerve deficit. Sensory: No sensory deficit. Motor: No weakness. Gait: Gait normal.   Psychiatric:         Mood and Affect: Mood normal.         Behavior: Behavior normal.       DIAGNOSTIC RESULTS   LABS:    Labs Reviewed - No data to display    When ordered only abnormal lab results are displayed. All other labs were within normal range or not returned as of this dictation. EKG: When ordered, EKG's are interpreted by the Emergency Department Physician in the absence of a cardiologist.  Please see their note for interpretation of EKG.     RADIOLOGY:   Non-plain film images such as CT, Ultrasound and MRI are read by the radiologist. Plain radiographic images are visualized and preliminarily interpreted by the ED Provider with the below findings:        Interpretation per the Radiologist below, if available at the time of this note:    CT CSpine W/O Contrast   Final Result   Head-      No acute intracranial abnormality. Stable pattern of atrophy and microangiopathic disease. ---      Cervical spine-      No acute fracture or subluxation of the cervical spine. Overall moderate multilevel spondylosis and facet arthropathy. CT Head W/O Contrast   Final Result   Head-      No acute intracranial abnormality. Stable pattern of atrophy and microangiopathic disease. ---      Cervical spine-      No acute fracture or subluxation of the cervical spine. Overall moderate multilevel spondylosis and facet arthropathy. No results found. PROCEDURES   Unless otherwise noted below, none     Procedures    CRITICAL CARE TIME       CONSULTS:  None      EMERGENCY DEPARTMENT COURSE and DIFFERENTIAL DIAGNOSIS/MDM:   Vitals:    Vitals:    08/28/22 1057 08/28/22 1144 08/28/22 1214 08/28/22 1245   BP: 135/87 (!) 142/77 (!) 153/83 (!) 141/77   Pulse: 82      Resp: 16      Temp: 97.5 °F (36.4 °C)      TempSrc: Oral      SpO2: 97% 99%  96%   Weight: 93 lb (42.2 kg)      Height: 4' 9\" (1.448 m)          Patient was given the following medications:  Medications - No data to display      Is this patient to be included in the SEP-1 Core Measure due to severe sepsis or septic shock? No   Exclusion criteria - the patient is NOT to be included for SEP-1 Core Measure due to: Infection is not suspected    79-year-old female presents the emergency department due to fall around 10:00 today. Patient's niece's  found the patient on the floor in no acute distress sitting up without any head injuries. Patient called out for help at the time of her fall. This was an unwitnessed fall. Patient does have some lower leg feet and ankle swelling which is actually improved per patient's niece and this may contribute to her fall.   On exam patient appears well in no acute distress she is able to answer all my questions she does not have any focal neurological deficits patient is able to move all extremities without any acute distress. Patient is on Xarelto and had an unwitnessed fall for this reason CT scan of the head and neck were ordered and did not show any acute abnormalities. Patient denies any loss of consciousness. Patient does not have any nausea vomiting, chest pain or shortness of breath. I do not feel that further work-up is needed at this time. Patient's niece also agrees with me as well. Patient's niece lives with the patient and can take care of her at home. Patient will be discharged at this time to follow-up with primary care doctor and instructed to return to the emergency department if she has any headaches, vision changes, nausea, vomiting, chest pain, shortness of breath or difficulty breathing. Patient and patient's niece understand and agree to this plan. All questions were answered. FINAL IMPRESSION      1.  Fall, initial encounter          DISPOSITION/PLAN   DISPOSITION Decision To Discharge 08/28/2022 12:55:28 PM      PATIENT REFERRED TO:  Apolinar Tidwell MD  [address] [place] [address]  [phone]    Schedule an appointment as soon as possible for a visit in 3 days  [facility] Emergency Department  [address] [place] [address]  [phone]    As needed, If symptoms worsen    DISCHARGE MEDICATIONS:  Discharge Medication List as of 8/28/2022  1:09 PM          DISCONTINUED MEDICATIONS:  Discharge Medication List as of 8/28/2022  1:09 PM                 (Please note that portions of this note were completed with a voice recognition program.  Efforts were made to edit the dictations but occasionally words are mis-transcribed.)    MARLIN Haddad (electronically signed)            MARLIN Haddad  08/28/22 35 20 43

## 2022-08-28 NOTE — DISCHARGE INSTRUCTIONS
Follow-up with primary care doctor within the next week for recheck. Return to the emergency department if he has any changes or worsening symptoms such as headache, vision changes, nausea, vomiting or confusion. Take Tylenol and ibuprofen for pain as needed.

## 2022-08-29 ENCOUNTER — CARE COORDINATION (OUTPATIENT)
Dept: CARE COORDINATION | Age: 87
End: 2022-08-29

## 2022-08-29 NOTE — CARE COORDINATION
Ambulatory Care Coordination  ED Follow up Call    RN-CC outreached patient's granddaughter, Meng Cardoso. Introduced role of RN-CC. Meng Cardoso denies need for further outreach. [facility] ED 8/28 for fall at home. Suffered skin tear to right arm secondary to fall. Meng Cardoso would like pcp to re-evaluate skin tear. Meng Cardoso states she will call today to schedule ED f/u with pcp. RN-CC educated on keeping wound clean & dry; advised on using non-adhesive barrier to skin tear to avoid further trauma. Meng Cardoso states she is familiar with wound care and knows what to do. She denies questions, concerns, or need for additional f/u from RN-CC. Reason for ED visit:  [facility] 8/28 fall at home  Status:     not changed    Did you call your PCP prior to going to the ED? No      Did you receive a discharge instructions from the Emergency Room? Yes  Review of Instructions:     Understands what to report/when to return?:  Yes   Understands discharge instructions?:  Yes   Following discharge instructions?:  Yes   If not why? Are there any new complaints of pain? No  New Pain Meds? No    Constipation prophylaxis needed? No    If you have a wound is the dressing clean, dry, and intact? Yes  Understands wound care regimen? Yes    Are there any other complaints/concerns that you wish to tell your provider? Meng Cardoso would like skin tear to right arm re-evaluated. She does not feel it was treated properly in the ER. FU appts/Provider:    No future appointments. Meng Cardoso will call today to schedule ED f/u w/pcp. New Medications?:   No      Medication Reconciliation by phone - Yes  Understands Medications? Yes  Taking Medications? Yes  Can you swallow your pills? Yes    Any further needs in the home i.e. Equipment? No    Link to services in community?:  Yes   Which services:  Active with Palliative Care

## 2022-08-30 ENCOUNTER — TELEPHONE (OUTPATIENT)
Dept: FAMILY MEDICINE CLINIC | Age: 87
End: 2022-08-30

## 2022-08-30 ENCOUNTER — TELEMEDICINE (OUTPATIENT)
Dept: FAMILY MEDICINE CLINIC | Age: 87
End: 2022-08-30
Payer: MEDICARE

## 2022-08-30 DIAGNOSIS — I48.20 CHRONIC ATRIAL FIBRILLATION (HCC): ICD-10-CM

## 2022-08-30 DIAGNOSIS — M54.9 CHRONIC BACK PAIN, UNSPECIFIED BACK LOCATION, UNSPECIFIED BACK PAIN LATERALITY: ICD-10-CM

## 2022-08-30 DIAGNOSIS — S41.111A LACERATION OF RIGHT UPPER EXTREMITY, INITIAL ENCOUNTER: Primary | ICD-10-CM

## 2022-08-30 DIAGNOSIS — G89.29 CHRONIC BACK PAIN, UNSPECIFIED BACK LOCATION, UNSPECIFIED BACK PAIN LATERALITY: ICD-10-CM

## 2022-08-30 PROCEDURE — 99214 OFFICE O/P EST MOD 30 MIN: CPT | Performed by: FAMILY MEDICINE

## 2022-08-30 PROCEDURE — 1123F ACP DISCUSS/DSCN MKR DOCD: CPT | Performed by: FAMILY MEDICINE

## 2022-08-30 PROCEDURE — G8427 DOCREV CUR MEDS BY ELIG CLIN: HCPCS | Performed by: FAMILY MEDICINE

## 2022-08-30 PROCEDURE — 1036F TOBACCO NON-USER: CPT | Performed by: FAMILY MEDICINE

## 2022-08-30 PROCEDURE — 1090F PRES/ABSN URINE INCON ASSESS: CPT | Performed by: FAMILY MEDICINE

## 2022-08-30 PROCEDURE — G8420 CALC BMI NORM PARAMETERS: HCPCS | Performed by: FAMILY MEDICINE

## 2022-08-30 NOTE — PROGRESS NOTES
TELEHEALTH EVALUATION -- Audio/Visual (During ZRKXW-08 public health emergency)    Christean Rinne   YOB: 1928    Date of Visit:  8/30/2022    Allergies   Allergen Reactions    Tramadol-Acetaminophen Hallucinations     PLEASE DO NOTE PRESCRIBE THIS MEDICATION    Aleve [Naproxen]      Current Outpatient Medications on File Prior to Visit   Medication Sig Dispense Refill    dilTIAZem HCl ER Coated Beads (CARDIZEM LA) 240 MG TB24 TAKE 1 TABLET BY MOUTH EVERY DAY 90 tablet 0    lovastatin (MEVACOR) 40 MG tablet TAKE 1 TABLET BY MOUTH EVERYDAY AT BEDTIME 90 tablet 0    gabapentin (NEURONTIN) 100 MG capsule Take 1-4 capsules by mouth 2 times daily as needed (back pain) for up to 30 days. 240 capsule 0    traMADol (ULTRAM) 50 MG tablet Take 50 mg by mouth every 8 hours as needed for Pain. Darylkamari May patient's granddaughter said patient hardly ever takes the Tramadol d/t causes hallucinations. pantoprazole (PROTONIX) 20 MG tablet TAKE 1 TABLET BY MOUTH EVERY DAY BEFORE BREAKFAST (Patient taking differently: Granddaughters said patient will take Protonix or Pepcid daily. Does not take both at the same time.) 90 tablet 3    nitrofurantoin, macrocrystal-monohydrate, (MACROBID) 100 MG capsule Take 100 mg by mouth 2 times daily as needed For UTI      furosemide (LASIX) 20 MG tablet Take 1 tablet by mouth 2 times daily (Patient taking differently: Take 20 mg by mouth as needed 1 to 2 times as day as needed) 180 tablet 1    famotidine (PEPCID) 20 MG tablet Take 1 tablet by mouth 2 times daily (Patient taking differently: Take 20 mg by mouth daily as needed Granddaughters said patient will take Protonix or Pepcid daily.  Does not take both at the same time.) 60 tablet 11    XARELTO 15 MG TABS tablet TAKE 1 TABLET BY MOUTH EVERY DAY 90 tablet 1    Docusate Sodium (DOCULASE PO) Take by mouth      SODIUM CHLORIDE, EXTERNAL, 0.9 % SOLN Apply 10 mLs topically daily 1000 mL 0    polyethylene glycol (GLYCOLAX) 17 g packet Take 17 g by mouth daily as needed for Constipation      Blood Pressure KIT 1 each by Does not apply route once a week 1 kit 0     No current facility-administered medications on file prior to visit. Wt Readings from Last 3 Encounters:   22 93 lb (42.2 kg)   22 105 lb (47.6 kg)   21 113 lb (51.3 kg)     BP Readings from Last 3 Encounters:   22 (!) 141/77   22 130/82   21 130/82          Carter Worrell (:  4/10/1928) has requested to and consented to an audio/video evaluation for the concerns or medical issues discussed below. Chief Complaint   Patient presents with    Follow-up     [phone]       HPI  Has appt tomorrow morning - with the palliative care specialist.      Fall:  Patient had an unwitnessed fall getting out of bed 22. Her feet slipped and she didn't fall hard but she hit her head and had a CT in the ER that was ok - went by ambulance. Has a cut on her arm. Has been changing the bandage once a day- using non stick gauze pads. Doesn't appear infected but has a skin flap. No pain aside from with dressing changes. Getting different slippers to help with stability. Abdominal pain:  resolved. Leg swelling:  Takes lasix prn. Doesn't take the lasix very often. Back pain/compression fracture on MRI: Patient does not care to intervene. Does not want to do DXA. Declined medication for osteoporosis. Doing well with just 300mg gabpentin in the AM and the prednisone from palliative care. Constipation:  Controlled with miralax. Atrial Fibrillation: The patient is on xarelto for anticoagulation - reports she has always been on 15 mg and Cardizem for rate control. Hx of seeing Dr. Pawel Gonzalez last in 2017. She has been stable on this regimen a long time. HTN: The patient is on Diltiazem. BP has been good at home. HLD: She is on Mevacor. CKD stage 3:      Ankle edema: the patient takes lasix as needed.  Hasn't needed it in months. GERD:  Takes pepcid BID. Has no symptoms. Hx GI bleed 10/2020:  She had a colonoscopy which showed a polyp that was a tubular adenoma. The bleeding was thought to be related to hemorrhoids - on annusol suppositories. She was evaluated by general surgery in the hospital as she had bleeding the day after surgery. Her Xarelto was initially held but resumed prior to discharge. Hx of fall 3/20:  Slipped falling out of the shower. H She declined additional intervention. Granddaughter has purchase a bathtub seat now and has made arrangements so it is safer. Hx of epistasix: hx of seeing ENT in 2019. Given bactroban and saline gel to use. Hx of ARF /2 sepsis- . Hx of skin cancer: ? type     Hx of cataract surgery      SH:  : Lives with Jordi Reyna and her . 6/15/20: she lives with her Arvid Jews is age 31(works for [employer] for [employer]?)  and has lived with her since birth. Patient's daughter used to live with them too but she  10 years ago. No other children.         Social History     Socioeconomic History    Marital status:      Spouse name: Not on file    Number of children: Not on file    Years of education: Not on file    Highest education level: Not on file   Occupational History    Not on file   Tobacco Use    Smoking status: Never    Smokeless tobacco: Never   Vaping Use    Vaping Use: Never used   Substance and Sexual Activity    Alcohol use: No    Drug use: Never    Sexual activity: Not on file   Other Topics Concern    Not on file   Social History Narrative    Not on file     Social Determinants of Health     Financial Resource Strain: Low Risk     Difficulty of Paying Living Expenses: Not hard at all   Food Insecurity: No Food Insecurity    Worried About Running Out of Food in the Last Year: Never true    [address] in the Last Year: Never true   Transportation Needs: No Transportation Needs    Lack of Transportation (Medical): No    Lack of Transportation (Non-Medical): No   Physical Activity: Inactive    Days of Exercise per Week: 0 days    Minutes of Exercise per Session: 0 min   Stress: No Stress Concern Present    Feeling of Stress : Only a little   Social Connections: Moderately Isolated    Frequency of Communication with Friends and Family: More than three times a week    Frequency of Social Gatherings with Friends and Family: More than three times a week    Attends Orthodox Services: More than 4 times per year    Active Member of [address] [place] [address] [employer] or Organizations: No    Attends Club or Organization Meetings: Never    Marital Status:    Intimate Partner Violence: Not on file   Housing Stability: [address] to Pay for Housing in the Last Year: No    Number of [place] in the Last Year: 1    Unstable Housing in the Last Year: No         Review of Systems  As documented in the HPI. Currently no complaints of CP or CIERA. Vital Signs: (As obtained by patient/caregiver or practitioner observation)    There were no vitals taken for this visit. Patient-Reported Vitals 8/30/2022   Patient-Reported Weight 93 lb   Patient-Reported Height 4 9   Patient-Reported Systolic -   Patient-Reported Diastolic -   Patient-Reported Pulse -   Patient-Reported Temperature -        Physical Exam  Constitutional:       General: She is not in acute distress. Appearance: Normal appearance. She is not ill-appearing. HENT:      Head: Normocephalic and atraumatic. Nose: Nose normal.   Pulmonary:      Effort: Pulmonary effort is normal. No respiratory distress. Neurological:      General: No focal deficit present. Mental Status: She is alert. Psychiatric:         Mood and Affect: Mood normal.         Behavior: Behavior normal.         Assessment/Plan     1. Laceration of right upper extremity, initial encounter  Stable. Does not appear to be infected.  Will work on getting home health care- Brian Gallo will transcription errors may be present. --Bryon Bonilla MD on 8/30/2022    An electronic signature was used to authenticate this note.

## 2022-09-30 DIAGNOSIS — G89.29 CHRONIC BACK PAIN, UNSPECIFIED BACK LOCATION, UNSPECIFIED BACK PAIN LATERALITY: ICD-10-CM

## 2022-09-30 DIAGNOSIS — M54.9 CHRONIC BACK PAIN, UNSPECIFIED BACK LOCATION, UNSPECIFIED BACK PAIN LATERALITY: ICD-10-CM

## 2022-09-30 NOTE — TELEPHONE ENCOUNTER
Medication:   Requested Prescriptions     Pending Prescriptions Disp Refills    gabapentin (NEURONTIN) 100 MG capsule 240 capsule 0     Sig: Take 1-4 capsules by mouth 2 times daily as needed (back pain) for up to 30 days. Last Filled:  7/11/2022 240 caps 0 refills     Patient Phone Number: [phone] (home)     Last appt: 8/30/2022   Next appt: 9/30/2022    Last OARRS:   RX Monitoring 5/9/2022   Periodic Controlled Substance Monitoring Possible medication side effects, risk of tolerance/dependence & alternative treatments discussed. ;No signs of potential drug abuse or diversion identified.

## 2022-09-30 NOTE — TELEPHONE ENCOUNTER
Medication:   Requested Prescriptions     Pending Prescriptions Disp Refills    dilTIAZem HCl ER Coated Beads (CARDIZEM LA) 240 MG TB24 [Pharmacy Med Name: CARDIZEM  MG TABLET] 90 tablet 0     Sig: TAKE 1 TABLET BY MOUTH EVERY DAY        Last Filled:  8/9/2022 90 tabs 0 refills     Patient Phone Number: [phone] (home)     Last appt: 8/30/2022   Next appt: 9/30/2022    Last OARRS:   RX Monitoring 5/9/2022   Periodic Controlled Substance Monitoring Possible medication side effects, risk of tolerance/dependence & alternative treatments discussed. ;No signs of potential drug abuse or diversion identified.

## 2022-09-30 NOTE — TELEPHONE ENCOUNTER
Medication:   Requested Prescriptions     Pending Prescriptions Disp Refills    dilTIAZem HCl ER Coated Beads (CARDIZEM LA) 240 MG TB24 90 tablet 0     Sig: TAKE 1 TABLET BY MOUTH EVERY DAY        Last Filled:  8/9/2022 90 tabs 0 refills     Patient Phone Number: [phone] (home)     Last appt: 8/30/2022   Next appt: Visit date not found    Last OARRS:   RX Monitoring 5/9/2022   Periodic Controlled Substance Monitoring Possible medication side effects, risk of tolerance/dependence & alternative treatments discussed. ;No signs of potential drug abuse or diversion identified.

## 2022-10-03 RX ORDER — GABAPENTIN 100 MG/1
100-400 CAPSULE ORAL 2 TIMES DAILY PRN
Qty: 240 CAPSULE | Refills: 0 | Status: SHIPPED | OUTPATIENT
Start: 2022-10-03 | End: 2022-11-02

## 2022-10-03 RX ORDER — DILTIAZEM HYDROCHLORIDE EXTENDED-RELEASE TABLETS 240 MG/1
TABLET, EXTENDED RELEASE ORAL
Qty: 90 TABLET | Refills: 0 | Status: SHIPPED | OUTPATIENT
Start: 2022-10-03

## 2022-10-03 RX ORDER — DILTIAZEM HYDROCHLORIDE EXTENDED-RELEASE TABLETS 240 MG/1
TABLET, EXTENDED RELEASE ORAL
Qty: 90 TABLET | Refills: 0 | OUTPATIENT
Start: 2022-10-03

## 2022-10-10 ENCOUNTER — TELEPHONE (OUTPATIENT)
Dept: FAMILY MEDICINE CLINIC | Age: 87
End: 2022-10-10

## 2022-10-10 NOTE — TELEPHONE ENCOUNTER
Please call the patient and have the patient schedule a transitional care hospital follow up appointment within 14 days of their discharge. Please then create a transitional care note in a telephone encounter to document the scheduled appointment. Thanks.

## 2022-10-10 NOTE — TELEPHONE ENCOUNTER
Attempted to call in order to schedule. Could not leave a message due to the voicemail box being full. Will try again later.

## 2023-05-19 NOTE — TELEPHONE ENCOUNTER
Medication:   Requested Prescriptions     Pending Prescriptions Disp Refills    famotidine (PEPCID) 20 MG tablet 60 tablet 11     Sig: Take 1 tablet by mouth 2 times daily        Last Filled:  4/17/2016 60 tabs 11 refills     Patient Phone Number: [phone] (home)     Last appt: 1/13/2021   Next appt: 1/15/2021    Last OARRS: No flowsheet data found. 07-Apr-2022

## 2024-03-22 NOTE — TELEPHONE ENCOUNTER
From: Mario Meyers  To: Keisha Rosales MD  Sent: 12/16/2020 5:51 PM EST  Subject: Prescription Question    Good evening,   I need to request refills for the Cardizem LA and furosemide. I was not able to request through MyChart medications. Thank you.
Medication:   Requested Prescriptions     Pending Prescriptions Disp Refills    furosemide (LASIX) 40 MG tablet 60 tablet      Sig: Take 1 tablet by mouth as needed    dilTIAZem (CARDIZEM CD) 240 MG extended release capsule 30 capsule 11     Sig: Take 1 capsule by mouth daily        Last Filled:  Unknown     Patient Phone Number: [phone] (home)     Last appt: 10/6/2020   Next appt: 12/17/2020    Last OARRS: No flowsheet data found.
T(C): 36.8 (03-22-24 @ 19:10), Max: 37 (03-22-24 @ 12:06)  HR: 80 (03-22-24 @ 19:10) (67 - 81)  BP: 103/71 (03-22-24 @ 19:10) (101/65 - 108/69)  RR: 18 (03-22-24 @ 19:10) (16 - 18)  SpO2: 99% (03-22-24 @ 19:10) (98% - 100%)    CONSTITUTIONAL: Well groomed, no apparent distress  RESP: No respiratory distress, no use of accessory muscles  CV: RRR, no JVD; no peripheral edema  GI: Soft, mild TTP at RLQ, ND, no rebound, no guarding; no palpable masses; no hepatosplenomegaly; no hernia palpated  NEURO: CN II-XII intact; sensation intact in upper and lower extremities b/l to light touch   PSYCH: Appropriate insight/judgment; A+O x 3, mood and affect appropriate, recent/remote memory intact

## (undated) DEVICE — Device: Brand: SPOT EX ENDOSCOPIC TATTOO

## (undated) DEVICE — Device: Brand: DISPOSABLE ELECTROSURGICAL SNARE

## (undated) DEVICE — WORKING LENGTH 155CM, WORKING CHANNEL 2.8MM: Brand: RESOLUTION 360 CLIP

## (undated) DEVICE — SET VLV 3 PC AWS DISPOSABLE GRDIAN SCOPEVALET

## (undated) DEVICE — GOWN AURORA NONREINF LG: Brand: MEDLINE INDUSTRIES, INC.

## (undated) DEVICE — BW-412T DISP COMBO CLEANING BRUSH: Brand: SINGLE USE COMBINATION CLEANING BRUSH

## (undated) DEVICE — TRAP SPEC RETRV CLR PLAS POLYP IN LN SUCT QUIK CTCH

## (undated) DEVICE — NEEDLE 25GAX5.0MM INJ CARR LOCKE

## (undated) DEVICE — ELECTRODE PT RET AD L9FT HI MOIST COND ADH HYDRGEL CORDED

## (undated) DEVICE — SOLUTION IV IRRIG WATER 500ML POUR BRL ST 2F7113

## (undated) DEVICE — PROCEDURE KIT ENDOSCP CUST